# Patient Record
Sex: MALE | Race: WHITE | Employment: FULL TIME | URBAN - METROPOLITAN AREA
[De-identification: names, ages, dates, MRNs, and addresses within clinical notes are randomized per-mention and may not be internally consistent; named-entity substitution may affect disease eponyms.]

---

## 2017-02-07 RX ORDER — NAPROXEN 500 MG/1
TABLET ORAL
Qty: 180 TABLET | Refills: 0 | Status: SHIPPED | OUTPATIENT
Start: 2017-02-07 | End: 2017-05-06 | Stop reason: SDUPTHER

## 2017-05-08 RX ORDER — NAPROXEN 500 MG/1
TABLET ORAL
Qty: 180 TABLET | Refills: 1 | Status: SHIPPED | OUTPATIENT
Start: 2017-05-08 | End: 2017-11-06 | Stop reason: SDUPTHER

## 2017-06-06 ENCOUNTER — TELEPHONE (OUTPATIENT)
Dept: FAMILY MEDICINE CLINIC | Age: 57
End: 2017-06-06

## 2017-06-06 DIAGNOSIS — E78.5 HYPERLIPIDEMIA, UNSPECIFIED HYPERLIPIDEMIA TYPE: Primary | ICD-10-CM

## 2017-06-06 DIAGNOSIS — I10 ESSENTIAL HYPERTENSION: ICD-10-CM

## 2017-06-06 DIAGNOSIS — E11.8 TYPE 2 DIABETES MELLITUS WITH COMPLICATION, WITHOUT LONG-TERM CURRENT USE OF INSULIN (HCC): ICD-10-CM

## 2017-06-06 DIAGNOSIS — Z12.5 PROSTATE CANCER SCREENING: ICD-10-CM

## 2017-06-07 DIAGNOSIS — Z12.5 PROSTATE CANCER SCREENING: ICD-10-CM

## 2017-06-07 DIAGNOSIS — E78.5 HYPERLIPIDEMIA, UNSPECIFIED HYPERLIPIDEMIA TYPE: ICD-10-CM

## 2017-06-07 DIAGNOSIS — I10 ESSENTIAL HYPERTENSION: ICD-10-CM

## 2017-06-07 DIAGNOSIS — E11.8 TYPE 2 DIABETES MELLITUS WITH COMPLICATION, WITHOUT LONG-TERM CURRENT USE OF INSULIN (HCC): ICD-10-CM

## 2017-06-07 LAB
ALBUMIN SERPL-MCNC: 4.3 G/DL (ref 3.9–4.9)
ALP BLD-CCNC: 61 U/L (ref 35–104)
ALT SERPL-CCNC: 23 U/L (ref 0–41)
ANION GAP SERPL CALCULATED.3IONS-SCNC: 15 MEQ/L (ref 7–13)
AST SERPL-CCNC: 25 U/L (ref 0–40)
BASOPHILS ABSOLUTE: 0 K/UL (ref 0–0.2)
BASOPHILS RELATIVE PERCENT: 0.4 %
BILIRUB SERPL-MCNC: 0.4 MG/DL (ref 0–1.2)
BUN BLDV-MCNC: 20 MG/DL (ref 6–20)
CALCIUM SERPL-MCNC: 9.6 MG/DL (ref 8.6–10.2)
CHLORIDE BLD-SCNC: 104 MEQ/L (ref 98–107)
CHOLESTEROL, TOTAL: 191 MG/DL (ref 0–199)
CO2: 24 MEQ/L (ref 22–29)
CREAT SERPL-MCNC: 1.22 MG/DL (ref 0.7–1.2)
CREATININE URINE: 137.1 MG/DL
EOSINOPHILS ABSOLUTE: 0 K/UL (ref 0–0.7)
EOSINOPHILS RELATIVE PERCENT: 0.3 %
GFR AFRICAN AMERICAN: >60
GFR NON-AFRICAN AMERICAN: >60
GLOBULIN: 2.1 G/DL (ref 2.3–3.5)
GLUCOSE BLD-MCNC: 96 MG/DL (ref 74–109)
HBA1C MFR BLD: 8.1 % (ref 4.8–5.9)
HCT VFR BLD CALC: 43.1 % (ref 42–52)
HDLC SERPL-MCNC: 66 MG/DL (ref 40–59)
HEMOGLOBIN: 14.1 G/DL (ref 14–18)
LDL CHOLESTEROL CALCULATED: 113 MG/DL (ref 0–129)
LYMPHOCYTES ABSOLUTE: 1.7 K/UL (ref 1–4.8)
LYMPHOCYTES RELATIVE PERCENT: 22.9 %
MCH RBC QN AUTO: 27.9 PG (ref 27–31.3)
MCHC RBC AUTO-ENTMCNC: 32.6 % (ref 33–37)
MCV RBC AUTO: 85.4 FL (ref 80–100)
MICROALBUMIN UR-MCNC: 2.3 MG/DL
MICROALBUMIN/CREAT UR-RTO: 16.8 MG/G (ref 0–30)
MONOCYTES ABSOLUTE: 0.5 K/UL (ref 0.2–0.8)
MONOCYTES RELATIVE PERCENT: 6 %
NEUTROPHILS ABSOLUTE: 5.3 K/UL (ref 1.4–6.5)
NEUTROPHILS RELATIVE PERCENT: 70.4 %
PDW BLD-RTO: 14.4 % (ref 11.5–14.5)
PLATELET # BLD: 151 K/UL (ref 130–400)
POTASSIUM SERPL-SCNC: 5 MEQ/L (ref 3.5–5.1)
PROSTATE SPECIFIC ANTIGEN: 0.52 NG/ML (ref 0–3.89)
RBC # BLD: 5.05 M/UL (ref 4.7–6.1)
SODIUM BLD-SCNC: 143 MEQ/L (ref 132–144)
TOTAL PROTEIN: 6.4 G/DL (ref 6.4–8.1)
TRIGL SERPL-MCNC: 58 MG/DL (ref 0–200)
TSH SERPL DL<=0.05 MIU/L-ACNC: 2.48 UIU/ML (ref 0.27–4.2)
WBC # BLD: 7.6 K/UL (ref 4.8–10.8)

## 2017-06-15 ENCOUNTER — OFFICE VISIT (OUTPATIENT)
Dept: FAMILY MEDICINE CLINIC | Age: 57
End: 2017-06-15

## 2017-06-15 VITALS
WEIGHT: 194 LBS | TEMPERATURE: 98.4 F | DIASTOLIC BLOOD PRESSURE: 74 MMHG | HEART RATE: 72 BPM | BODY MASS INDEX: 28.73 KG/M2 | SYSTOLIC BLOOD PRESSURE: 106 MMHG | RESPIRATION RATE: 14 BRPM | HEIGHT: 69 IN

## 2017-06-15 DIAGNOSIS — E11.8 TYPE 2 DIABETES MELLITUS WITH COMPLICATION, WITHOUT LONG-TERM CURRENT USE OF INSULIN (HCC): Primary | ICD-10-CM

## 2017-06-15 DIAGNOSIS — M25.561 CHRONIC PAIN OF BOTH KNEES: ICD-10-CM

## 2017-06-15 DIAGNOSIS — E78.5 HYPERLIPIDEMIA, UNSPECIFIED HYPERLIPIDEMIA TYPE: ICD-10-CM

## 2017-06-15 DIAGNOSIS — K21.9 GASTROESOPHAGEAL REFLUX DISEASE, ESOPHAGITIS PRESENCE NOT SPECIFIED: ICD-10-CM

## 2017-06-15 DIAGNOSIS — I10 ESSENTIAL HYPERTENSION: ICD-10-CM

## 2017-06-15 DIAGNOSIS — G89.29 CHRONIC PAIN OF BOTH KNEES: ICD-10-CM

## 2017-06-15 DIAGNOSIS — M25.562 CHRONIC PAIN OF BOTH KNEES: ICD-10-CM

## 2017-06-15 DIAGNOSIS — Z12.11 SCREENING FOR COLON CANCER: ICD-10-CM

## 2017-06-15 PROCEDURE — G8427 DOCREV CUR MEDS BY ELIG CLIN: HCPCS | Performed by: FAMILY MEDICINE

## 2017-06-15 PROCEDURE — G8419 CALC BMI OUT NRM PARAM NOF/U: HCPCS | Performed by: FAMILY MEDICINE

## 2017-06-15 PROCEDURE — 99214 OFFICE O/P EST MOD 30 MIN: CPT | Performed by: FAMILY MEDICINE

## 2017-06-15 PROCEDURE — 3017F COLORECTAL CA SCREEN DOC REV: CPT | Performed by: FAMILY MEDICINE

## 2017-06-15 PROCEDURE — 1036F TOBACCO NON-USER: CPT | Performed by: FAMILY MEDICINE

## 2017-06-15 PROCEDURE — 3046F HEMOGLOBIN A1C LEVEL >9.0%: CPT | Performed by: FAMILY MEDICINE

## 2017-06-15 RX ORDER — ENALAPRIL MALEATE 10 MG/1
TABLET ORAL
Qty: 90 TABLET | Refills: 3 | Status: SHIPPED | OUTPATIENT
Start: 2017-06-15 | End: 2018-04-10 | Stop reason: SDUPTHER

## 2017-06-15 RX ORDER — ASPIRIN 81 MG/1
81 TABLET ORAL DAILY
Qty: 90 TABLET | Refills: 3 | Status: SHIPPED | OUTPATIENT
Start: 2017-06-15 | End: 2018-07-20 | Stop reason: SDUPTHER

## 2017-06-15 RX ORDER — FAMOTIDINE 20 MG/1
TABLET, FILM COATED ORAL
Qty: 180 TABLET | Refills: 3 | Status: SHIPPED | OUTPATIENT
Start: 2017-06-15 | End: 2018-04-10 | Stop reason: SDUPTHER

## 2017-06-15 RX ORDER — LOVASTATIN 20 MG/1
TABLET ORAL
Qty: 90 TABLET | Refills: 3 | Status: SHIPPED | OUTPATIENT
Start: 2017-06-15 | End: 2018-04-10 | Stop reason: SDUPTHER

## 2017-06-15 ASSESSMENT — PATIENT HEALTH QUESTIONNAIRE - PHQ9
SUM OF ALL RESPONSES TO PHQ9 QUESTIONS 1 & 2: 0
2. FEELING DOWN, DEPRESSED OR HOPELESS: 0
SUM OF ALL RESPONSES TO PHQ QUESTIONS 1-9: 0
1. LITTLE INTEREST OR PLEASURE IN DOING THINGS: 0

## 2017-06-15 ASSESSMENT — ENCOUNTER SYMPTOMS
EYES NEGATIVE: 1
ALLERGIC/IMMUNOLOGIC NEGATIVE: 1
RESPIRATORY NEGATIVE: 1
GASTROINTESTINAL NEGATIVE: 1

## 2017-11-06 RX ORDER — NAPROXEN 500 MG/1
TABLET ORAL
Qty: 180 TABLET | Refills: 1 | Status: SHIPPED | OUTPATIENT
Start: 2017-11-06 | End: 2018-05-05 | Stop reason: SDUPTHER

## 2018-04-06 DIAGNOSIS — K21.9 GASTROESOPHAGEAL REFLUX DISEASE, ESOPHAGITIS PRESENCE NOT SPECIFIED: ICD-10-CM

## 2018-04-06 DIAGNOSIS — E78.5 HYPERLIPIDEMIA, UNSPECIFIED HYPERLIPIDEMIA TYPE: ICD-10-CM

## 2018-04-06 DIAGNOSIS — I10 ESSENTIAL HYPERTENSION: ICD-10-CM

## 2018-04-06 DIAGNOSIS — E11.8 TYPE 2 DIABETES MELLITUS WITH COMPLICATION, WITHOUT LONG-TERM CURRENT USE OF INSULIN (HCC): ICD-10-CM

## 2018-04-06 LAB
ALBUMIN SERPL-MCNC: 4.6 G/DL (ref 3.9–4.9)
ALP BLD-CCNC: 66 U/L (ref 35–104)
ALT SERPL-CCNC: 22 U/L (ref 0–41)
ANION GAP SERPL CALCULATED.3IONS-SCNC: 17 MEQ/L (ref 7–13)
AST SERPL-CCNC: 18 U/L (ref 0–40)
BASOPHILS ABSOLUTE: 0 K/UL (ref 0–0.2)
BASOPHILS RELATIVE PERCENT: 0.4 %
BILIRUB SERPL-MCNC: 0.5 MG/DL (ref 0–1.2)
BUN BLDV-MCNC: 32 MG/DL (ref 6–20)
CALCIUM SERPL-MCNC: 9.8 MG/DL (ref 8.6–10.2)
CHLORIDE BLD-SCNC: 103 MEQ/L (ref 98–107)
CHOLESTEROL, TOTAL: 166 MG/DL (ref 0–199)
CO2: 22 MEQ/L (ref 22–29)
CREAT SERPL-MCNC: 1.14 MG/DL (ref 0.7–1.2)
CREATININE URINE: 223.3 MG/DL
EOSINOPHILS ABSOLUTE: 0 K/UL (ref 0–0.7)
EOSINOPHILS RELATIVE PERCENT: 0.4 %
GFR AFRICAN AMERICAN: >60
GFR NON-AFRICAN AMERICAN: >60
GLOBULIN: 2 G/DL (ref 2.3–3.5)
GLUCOSE BLD-MCNC: 186 MG/DL (ref 74–109)
HBA1C MFR BLD: 10 % (ref 4.8–5.9)
HCT VFR BLD CALC: 44.2 % (ref 42–52)
HDLC SERPL-MCNC: 59 MG/DL (ref 40–59)
HEMOGLOBIN: 14.5 G/DL (ref 14–18)
LDL CHOLESTEROL CALCULATED: 97 MG/DL (ref 0–129)
LYMPHOCYTES ABSOLUTE: 1.2 K/UL (ref 1–4.8)
LYMPHOCYTES RELATIVE PERCENT: 22 %
MCH RBC QN AUTO: 28.8 PG (ref 27–31.3)
MCHC RBC AUTO-ENTMCNC: 32.8 % (ref 33–37)
MCV RBC AUTO: 87.8 FL (ref 80–100)
MICROALBUMIN UR-MCNC: 4.7 MG/DL
MICROALBUMIN/CREAT UR-RTO: 21 MG/G (ref 0–30)
MONOCYTES ABSOLUTE: 0.3 K/UL (ref 0.2–0.8)
MONOCYTES RELATIVE PERCENT: 5.4 %
NEUTROPHILS ABSOLUTE: 3.8 K/UL (ref 1.4–6.5)
NEUTROPHILS RELATIVE PERCENT: 71.8 %
PDW BLD-RTO: 14.7 % (ref 11.5–14.5)
PLATELET # BLD: 149 K/UL (ref 130–400)
POTASSIUM SERPL-SCNC: 5.3 MEQ/L (ref 3.5–5.1)
RBC # BLD: 5.04 M/UL (ref 4.7–6.1)
SODIUM BLD-SCNC: 142 MEQ/L (ref 132–144)
TOTAL PROTEIN: 6.6 G/DL (ref 6.4–8.1)
TRIGL SERPL-MCNC: 51 MG/DL (ref 0–200)
TSH REFLEX: 2.08 UIU/ML (ref 0.27–4.2)
WBC # BLD: 5.2 K/UL (ref 4.8–10.8)

## 2018-04-10 ENCOUNTER — OFFICE VISIT (OUTPATIENT)
Dept: FAMILY MEDICINE CLINIC | Age: 58
End: 2018-04-10
Payer: COMMERCIAL

## 2018-04-10 VITALS
SYSTOLIC BLOOD PRESSURE: 110 MMHG | DIASTOLIC BLOOD PRESSURE: 80 MMHG | HEART RATE: 76 BPM | OXYGEN SATURATION: 99 % | RESPIRATION RATE: 16 BRPM | BODY MASS INDEX: 29.1 KG/M2 | TEMPERATURE: 97.5 F | HEIGHT: 68 IN | WEIGHT: 192 LBS

## 2018-04-10 DIAGNOSIS — K21.9 GASTROESOPHAGEAL REFLUX DISEASE, ESOPHAGITIS PRESENCE NOT SPECIFIED: ICD-10-CM

## 2018-04-10 DIAGNOSIS — I10 ESSENTIAL HYPERTENSION: ICD-10-CM

## 2018-04-10 DIAGNOSIS — E11.42 TYPE 2 DIABETES MELLITUS WITH DIABETIC POLYNEUROPATHY, WITHOUT LONG-TERM CURRENT USE OF INSULIN (HCC): Primary | ICD-10-CM

## 2018-04-10 DIAGNOSIS — E78.5 HYPERLIPIDEMIA, UNSPECIFIED HYPERLIPIDEMIA TYPE: ICD-10-CM

## 2018-04-10 DIAGNOSIS — Z12.11 SCREENING FOR COLON CANCER: ICD-10-CM

## 2018-04-10 PROCEDURE — 3017F COLORECTAL CA SCREEN DOC REV: CPT | Performed by: FAMILY MEDICINE

## 2018-04-10 PROCEDURE — 99214 OFFICE O/P EST MOD 30 MIN: CPT | Performed by: FAMILY MEDICINE

## 2018-04-10 PROCEDURE — 2022F DILAT RTA XM EVC RTNOPTHY: CPT | Performed by: FAMILY MEDICINE

## 2018-04-10 PROCEDURE — G8419 CALC BMI OUT NRM PARAM NOF/U: HCPCS | Performed by: FAMILY MEDICINE

## 2018-04-10 PROCEDURE — G8427 DOCREV CUR MEDS BY ELIG CLIN: HCPCS | Performed by: FAMILY MEDICINE

## 2018-04-10 PROCEDURE — 3046F HEMOGLOBIN A1C LEVEL >9.0%: CPT | Performed by: FAMILY MEDICINE

## 2018-04-10 PROCEDURE — 1036F TOBACCO NON-USER: CPT | Performed by: FAMILY MEDICINE

## 2018-04-10 RX ORDER — ENALAPRIL MALEATE 5 MG/1
TABLET ORAL
Qty: 90 TABLET | Refills: 3 | Status: SHIPPED | OUTPATIENT
Start: 2018-04-10 | End: 2019-03-25 | Stop reason: SDUPTHER

## 2018-04-10 RX ORDER — LOVASTATIN 20 MG/1
TABLET ORAL
Qty: 90 TABLET | Refills: 3 | Status: SHIPPED | OUTPATIENT
Start: 2018-04-10

## 2018-04-10 RX ORDER — FAMOTIDINE 20 MG/1
TABLET, FILM COATED ORAL
Qty: 180 TABLET | Refills: 3 | Status: SHIPPED | OUTPATIENT
Start: 2018-04-10

## 2018-04-10 ASSESSMENT — ENCOUNTER SYMPTOMS
EYES NEGATIVE: 1
GASTROINTESTINAL NEGATIVE: 1
RESPIRATORY NEGATIVE: 1
ALLERGIC/IMMUNOLOGIC NEGATIVE: 1

## 2018-04-20 ENCOUNTER — TELEPHONE (OUTPATIENT)
Dept: FAMILY MEDICINE CLINIC | Age: 58
End: 2018-04-20

## 2018-05-07 RX ORDER — NAPROXEN 500 MG/1
TABLET ORAL
Qty: 180 TABLET | Refills: 1 | Status: SHIPPED | OUTPATIENT
Start: 2018-05-07 | End: 2018-11-03 | Stop reason: SDUPTHER

## 2018-07-20 DIAGNOSIS — E11.8 TYPE 2 DIABETES MELLITUS WITH COMPLICATION, WITHOUT LONG-TERM CURRENT USE OF INSULIN (HCC): ICD-10-CM

## 2018-07-20 DIAGNOSIS — E78.5 HYPERLIPIDEMIA, UNSPECIFIED HYPERLIPIDEMIA TYPE: ICD-10-CM

## 2018-07-20 DIAGNOSIS — I10 ESSENTIAL HYPERTENSION: ICD-10-CM

## 2018-07-20 RX ORDER — ASPIRIN 81 MG/1
TABLET ORAL
Qty: 90 TABLET | Refills: 3 | Status: SHIPPED | OUTPATIENT
Start: 2018-07-20

## 2018-08-14 ENCOUNTER — INITIAL CONSULT (OUTPATIENT)
Dept: SURGERY | Age: 58
End: 2018-08-14
Payer: COMMERCIAL

## 2018-08-14 VITALS
SYSTOLIC BLOOD PRESSURE: 128 MMHG | DIASTOLIC BLOOD PRESSURE: 78 MMHG | HEART RATE: 81 BPM | BODY MASS INDEX: 27.44 KG/M2 | WEIGHT: 185.3 LBS | OXYGEN SATURATION: 95 % | HEIGHT: 69 IN

## 2018-08-14 DIAGNOSIS — Z12.11 SCREENING FOR COLON CANCER: Primary | ICD-10-CM

## 2018-08-14 DIAGNOSIS — Z86.010 HISTORY OF ADENOMATOUS POLYP OF COLON: Primary | ICD-10-CM

## 2018-08-14 PROCEDURE — 3017F COLORECTAL CA SCREEN DOC REV: CPT | Performed by: SURGERY

## 2018-08-14 PROCEDURE — G8427 DOCREV CUR MEDS BY ELIG CLIN: HCPCS | Performed by: SURGERY

## 2018-08-14 PROCEDURE — G8419 CALC BMI OUT NRM PARAM NOF/U: HCPCS | Performed by: SURGERY

## 2018-08-14 PROCEDURE — 99203 OFFICE O/P NEW LOW 30 MIN: CPT | Performed by: SURGERY

## 2018-08-14 PROCEDURE — 1036F TOBACCO NON-USER: CPT | Performed by: SURGERY

## 2018-08-14 RX ORDER — SODIUM, POTASSIUM,MAG SULFATES 17.5-3.13G
1 SOLUTION, RECONSTITUTED, ORAL ORAL ONCE
Qty: 1 BOTTLE | Refills: 0 | Status: SHIPPED | OUTPATIENT
Start: 2018-08-14 | End: 2018-08-14

## 2018-08-14 ASSESSMENT — ENCOUNTER SYMPTOMS
TROUBLE SWALLOWING: 0
CHOKING: 0
ANAL BLEEDING: 0
ABDOMINAL PAIN: 0
ABDOMINAL DISTENTION: 0
EYE PAIN: 0
EYE DISCHARGE: 0
COLOR CHANGE: 0
SHORTNESS OF BREATH: 0
WHEEZING: 0
BACK PAIN: 0
CHEST TIGHTNESS: 0
VOMITING: 0

## 2018-09-21 ENCOUNTER — HOSPITAL ENCOUNTER (OUTPATIENT)
Dept: PREADMISSION TESTING | Age: 58
Discharge: HOME | End: 2018-09-25
Payer: COMMERCIAL

## 2018-09-21 VITALS
BODY MASS INDEX: 27.68 KG/M2 | OXYGEN SATURATION: 98 % | DIASTOLIC BLOOD PRESSURE: 79 MMHG | RESPIRATION RATE: 18 BRPM | HEIGHT: 68 IN | WEIGHT: 182.6 LBS | HEART RATE: 67 BPM | TEMPERATURE: 97.7 F | SYSTOLIC BLOOD PRESSURE: 139 MMHG

## 2018-09-21 DIAGNOSIS — Z86.010 HISTORY OF COLON POLYPS: ICD-10-CM

## 2018-09-21 LAB
ANION GAP SERPL CALCULATED.3IONS-SCNC: 13 MEQ/L (ref 7–13)
BUN BLDV-MCNC: 33 MG/DL (ref 6–20)
CALCIUM SERPL-MCNC: 9.4 MG/DL (ref 8.6–10.2)
CHLORIDE BLD-SCNC: 103 MEQ/L (ref 98–107)
CO2: 22 MEQ/L (ref 22–29)
CREAT SERPL-MCNC: 1.32 MG/DL (ref 0.7–1.2)
EKG ATRIAL RATE: 63 BPM
EKG P AXIS: 50 DEGREES
EKG P-R INTERVAL: 138 MS
EKG Q-T INTERVAL: 414 MS
EKG QRS DURATION: 86 MS
EKG QTC CALCULATION (BAZETT): 423 MS
EKG R AXIS: 68 DEGREES
EKG T AXIS: 40 DEGREES
EKG VENTRICULAR RATE: 63 BPM
GFR AFRICAN AMERICAN: >60
GFR NON-AFRICAN AMERICAN: 55.8
GLUCOSE BLD-MCNC: 142 MG/DL (ref 74–109)
HCT VFR BLD CALC: 45.9 % (ref 42–52)
HEMOGLOBIN: 15.1 G/DL (ref 14–18)
MCH RBC QN AUTO: 28.5 PG (ref 27–31.3)
MCHC RBC AUTO-ENTMCNC: 32.9 % (ref 33–37)
MCV RBC AUTO: 86.8 FL (ref 80–100)
PDW BLD-RTO: 14.4 % (ref 11.5–14.5)
PLATELET # BLD: 167 K/UL (ref 130–400)
POTASSIUM SERPL-SCNC: 4.8 MEQ/L (ref 3.5–5.1)
RBC # BLD: 5.28 M/UL (ref 4.7–6.1)
SODIUM BLD-SCNC: 138 MEQ/L (ref 132–144)
WBC # BLD: 5.3 K/UL (ref 4.8–10.8)

## 2018-09-21 PROCEDURE — 80048 BASIC METABOLIC PNL TOTAL CA: CPT

## 2018-09-21 PROCEDURE — 93005 ELECTROCARDIOGRAM TRACING: CPT

## 2018-09-21 PROCEDURE — 85027 COMPLETE CBC AUTOMATED: CPT

## 2018-09-21 RX ORDER — LIDOCAINE HYDROCHLORIDE 10 MG/ML
1 INJECTION, SOLUTION EPIDURAL; INFILTRATION; INTRACAUDAL; PERINEURAL
Status: CANCELLED | OUTPATIENT
Start: 2018-09-21 | End: 2018-09-21

## 2018-09-21 RX ORDER — SODIUM CHLORIDE 0.9 % (FLUSH) 0.9 %
10 SYRINGE (ML) INJECTION PRN
Status: CANCELLED | OUTPATIENT
Start: 2018-09-21

## 2018-09-21 RX ORDER — SODIUM CHLORIDE 0.9 % (FLUSH) 0.9 %
10 SYRINGE (ML) INJECTION EVERY 12 HOURS SCHEDULED
Status: CANCELLED | OUTPATIENT
Start: 2018-09-21

## 2018-09-21 RX ORDER — SODIUM CHLORIDE, SODIUM LACTATE, POTASSIUM CHLORIDE, CALCIUM CHLORIDE 600; 310; 30; 20 MG/100ML; MG/100ML; MG/100ML; MG/100ML
INJECTION, SOLUTION INTRAVENOUS CONTINUOUS
Status: CANCELLED | OUTPATIENT
Start: 2018-09-21

## 2018-09-27 ENCOUNTER — ANESTHESIA EVENT (OUTPATIENT)
Dept: OPERATING ROOM | Age: 58
End: 2018-09-27
Payer: COMMERCIAL

## 2018-09-28 ENCOUNTER — ANESTHESIA (OUTPATIENT)
Dept: OPERATING ROOM | Age: 58
End: 2018-09-28
Payer: COMMERCIAL

## 2018-09-28 ENCOUNTER — HOSPITAL ENCOUNTER (OUTPATIENT)
Age: 58
Setting detail: OUTPATIENT SURGERY
Discharge: HOME | End: 2018-09-28
Attending: SURGERY | Admitting: SURGERY
Payer: COMMERCIAL

## 2018-09-28 VITALS
SYSTOLIC BLOOD PRESSURE: 83 MMHG | RESPIRATION RATE: 18 BRPM | OXYGEN SATURATION: 100 % | DIASTOLIC BLOOD PRESSURE: 53 MMHG

## 2018-09-28 VITALS
SYSTOLIC BLOOD PRESSURE: 129 MMHG | TEMPERATURE: 98.1 F | OXYGEN SATURATION: 99 % | HEART RATE: 86 BPM | DIASTOLIC BLOOD PRESSURE: 68 MMHG | RESPIRATION RATE: 16 BRPM

## 2018-09-28 LAB
ANION GAP SERPL CALCULATED.3IONS-SCNC: 18 MEQ/L (ref 7–13)
BUN BLDV-MCNC: 28 MG/DL (ref 6–20)
CALCIUM SERPL-MCNC: 9.2 MG/DL (ref 8.6–10.2)
CHLORIDE BLD-SCNC: 102 MEQ/L (ref 98–107)
CO2: 20 MEQ/L (ref 22–29)
CREAT SERPL-MCNC: 1.72 MG/DL (ref 0.7–1.2)
EKG ATRIAL RATE: 73 BPM
EKG Q-T INTERVAL: 386 MS
EKG QRS DURATION: 84 MS
EKG QTC CALCULATION (BAZETT): 425 MS
EKG R AXIS: 45 DEGREES
EKG T AXIS: 2 DEGREES
EKG VENTRICULAR RATE: 73 BPM
GFR AFRICAN AMERICAN: 49.7
GFR NON-AFRICAN AMERICAN: 41.1
GLUCOSE BLD-MCNC: 128 MG/DL (ref 74–109)
GLUCOSE BLD-MCNC: 132 MG/DL (ref 60–115)
GLUCOSE BLD-MCNC: 177 MG/DL (ref 60–115)
MAGNESIUM: 2.1 MG/DL (ref 1.7–2.3)
PERFORMED ON: ABNORMAL
PERFORMED ON: ABNORMAL
POTASSIUM SERPL-SCNC: 4.6 MEQ/L (ref 3.5–5.1)
SODIUM BLD-SCNC: 140 MEQ/L (ref 132–144)

## 2018-09-28 PROCEDURE — 7100000001 HC PACU RECOVERY - ADDTL 15 MIN: Performed by: SURGERY

## 2018-09-28 PROCEDURE — 7100000000 HC PACU RECOVERY - FIRST 15 MIN: Performed by: SURGERY

## 2018-09-28 PROCEDURE — 7100000010 HC PHASE II RECOVERY - FIRST 15 MIN: Performed by: SURGERY

## 2018-09-28 PROCEDURE — 2580000003 HC RX 258: Performed by: NURSE PRACTITIONER

## 2018-09-28 PROCEDURE — 45338 SIGMOIDOSCOPY W/TUMR REMOVE: CPT | Performed by: SURGERY

## 2018-09-28 PROCEDURE — 3700000001 HC ADD 15 MINUTES (ANESTHESIA): Performed by: SURGERY

## 2018-09-28 PROCEDURE — 83735 ASSAY OF MAGNESIUM: CPT

## 2018-09-28 PROCEDURE — 80048 BASIC METABOLIC PNL TOTAL CA: CPT

## 2018-09-28 PROCEDURE — 2709999900 HC NON-CHARGEABLE SUPPLY: Performed by: SURGERY

## 2018-09-28 PROCEDURE — 93010 ELECTROCARDIOGRAM REPORT: CPT | Performed by: INTERNAL MEDICINE

## 2018-09-28 PROCEDURE — 2500000003 HC RX 250 WO HCPCS: Performed by: NURSE ANESTHETIST, CERTIFIED REGISTERED

## 2018-09-28 PROCEDURE — 7100000011 HC PHASE II RECOVERY - ADDTL 15 MIN: Performed by: SURGERY

## 2018-09-28 PROCEDURE — 93005 ELECTROCARDIOGRAM TRACING: CPT

## 2018-09-28 PROCEDURE — 2580000003 HC RX 258: Performed by: SURGERY

## 2018-09-28 PROCEDURE — 3700000000 HC ANESTHESIA ATTENDED CARE: Performed by: SURGERY

## 2018-09-28 PROCEDURE — 3609027000 HC COLONOSCOPY: Performed by: SURGERY

## 2018-09-28 PROCEDURE — 88305 TISSUE EXAM BY PATHOLOGIST: CPT

## 2018-09-28 PROCEDURE — 6360000002 HC RX W HCPCS: Performed by: NURSE ANESTHETIST, CERTIFIED REGISTERED

## 2018-09-28 RX ORDER — MEPERIDINE HYDROCHLORIDE 25 MG/ML
12.5 INJECTION INTRAMUSCULAR; INTRAVENOUS; SUBCUTANEOUS EVERY 5 MIN PRN
Status: DISCONTINUED | OUTPATIENT
Start: 2018-09-28 | End: 2018-09-28 | Stop reason: HOSPADM

## 2018-09-28 RX ORDER — METOPROLOL SUCCINATE 25 MG/1
25 TABLET, EXTENDED RELEASE ORAL DAILY
Qty: 30 TABLET | Refills: 5 | Status: SHIPPED | OUTPATIENT
Start: 2018-09-28

## 2018-09-28 RX ORDER — METOPROLOL SUCCINATE 25 MG/1
25 TABLET, EXTENDED RELEASE ORAL DAILY
Status: DISCONTINUED | OUTPATIENT
Start: 2018-09-28 | End: 2018-09-28 | Stop reason: HOSPADM

## 2018-09-28 RX ORDER — SODIUM CHLORIDE, SODIUM LACTATE, POTASSIUM CHLORIDE, CALCIUM CHLORIDE 600; 310; 30; 20 MG/100ML; MG/100ML; MG/100ML; MG/100ML
INJECTION, SOLUTION INTRAVENOUS CONTINUOUS
Status: DISCONTINUED | OUTPATIENT
Start: 2018-09-28 | End: 2018-09-28 | Stop reason: HOSPADM

## 2018-09-28 RX ORDER — FENTANYL CITRATE 50 UG/ML
50 INJECTION, SOLUTION INTRAMUSCULAR; INTRAVENOUS EVERY 10 MIN PRN
Status: DISCONTINUED | OUTPATIENT
Start: 2018-09-28 | End: 2018-09-28 | Stop reason: HOSPADM

## 2018-09-28 RX ORDER — MAGNESIUM HYDROXIDE 1200 MG/15ML
LIQUID ORAL PRN
Status: DISCONTINUED | OUTPATIENT
Start: 2018-09-28 | End: 2018-09-28 | Stop reason: HOSPADM

## 2018-09-28 RX ORDER — DIPHENHYDRAMINE HYDROCHLORIDE 50 MG/ML
12.5 INJECTION INTRAMUSCULAR; INTRAVENOUS
Status: DISCONTINUED | OUTPATIENT
Start: 2018-09-28 | End: 2018-09-28 | Stop reason: HOSPADM

## 2018-09-28 RX ORDER — HYDROCODONE BITARTRATE AND ACETAMINOPHEN 5; 325 MG/1; MG/1
1 TABLET ORAL PRN
Status: DISCONTINUED | OUTPATIENT
Start: 2018-09-28 | End: 2018-09-28 | Stop reason: HOSPADM

## 2018-09-28 RX ORDER — LIDOCAINE HYDROCHLORIDE 20 MG/ML
INJECTION, SOLUTION INFILTRATION; PERINEURAL PRN
Status: DISCONTINUED | OUTPATIENT
Start: 2018-09-28 | End: 2018-09-28 | Stop reason: SDUPTHER

## 2018-09-28 RX ORDER — LIDOCAINE HYDROCHLORIDE 10 MG/ML
1 INJECTION, SOLUTION EPIDURAL; INFILTRATION; INTRACAUDAL; PERINEURAL
Status: DISCONTINUED | OUTPATIENT
Start: 2018-09-28 | End: 2018-09-28 | Stop reason: HOSPADM

## 2018-09-28 RX ORDER — ONDANSETRON 2 MG/ML
4 INJECTION INTRAMUSCULAR; INTRAVENOUS
Status: DISCONTINUED | OUTPATIENT
Start: 2018-09-28 | End: 2018-09-28 | Stop reason: HOSPADM

## 2018-09-28 RX ORDER — METOCLOPRAMIDE HYDROCHLORIDE 5 MG/ML
10 INJECTION INTRAMUSCULAR; INTRAVENOUS
Status: DISCONTINUED | OUTPATIENT
Start: 2018-09-28 | End: 2018-09-28 | Stop reason: HOSPADM

## 2018-09-28 RX ORDER — SODIUM CHLORIDE 0.9 % (FLUSH) 0.9 %
10 SYRINGE (ML) INJECTION PRN
Status: DISCONTINUED | OUTPATIENT
Start: 2018-09-28 | End: 2018-09-28 | Stop reason: HOSPADM

## 2018-09-28 RX ORDER — HYDROCODONE BITARTRATE AND ACETAMINOPHEN 5; 325 MG/1; MG/1
2 TABLET ORAL PRN
Status: DISCONTINUED | OUTPATIENT
Start: 2018-09-28 | End: 2018-09-28 | Stop reason: HOSPADM

## 2018-09-28 RX ORDER — SODIUM CHLORIDE 0.9 % (FLUSH) 0.9 %
10 SYRINGE (ML) INJECTION EVERY 12 HOURS SCHEDULED
Status: DISCONTINUED | OUTPATIENT
Start: 2018-09-28 | End: 2018-09-28 | Stop reason: HOSPADM

## 2018-09-28 RX ORDER — PROPOFOL 10 MG/ML
INJECTION, EMULSION INTRAVENOUS PRN
Status: DISCONTINUED | OUTPATIENT
Start: 2018-09-28 | End: 2018-09-28 | Stop reason: SDUPTHER

## 2018-09-28 RX ADMIN — PROPOFOL 20 MG: 10 INJECTION, EMULSION INTRAVENOUS at 08:00

## 2018-09-28 RX ADMIN — PHENYLEPHRINE HYDROCHLORIDE 50 MCG: 10 INJECTION INTRAVENOUS at 08:02

## 2018-09-28 RX ADMIN — PROPOFOL 50 MG: 10 INJECTION, EMULSION INTRAVENOUS at 07:57

## 2018-09-28 RX ADMIN — PHENYLEPHRINE HYDROCHLORIDE 100 MCG: 10 INJECTION INTRAVENOUS at 07:34

## 2018-09-28 RX ADMIN — PROPOFOL 50 MG: 10 INJECTION, EMULSION INTRAVENOUS at 07:32

## 2018-09-28 RX ADMIN — LIDOCAINE HYDROCHLORIDE 40 MG: 20 INJECTION, SOLUTION INFILTRATION; PERINEURAL at 07:29

## 2018-09-28 RX ADMIN — PROPOFOL 30 MG: 10 INJECTION, EMULSION INTRAVENOUS at 07:52

## 2018-09-28 RX ADMIN — PROPOFOL 50 MG: 10 INJECTION, EMULSION INTRAVENOUS at 07:29

## 2018-09-28 RX ADMIN — PROPOFOL 20 MG: 10 INJECTION, EMULSION INTRAVENOUS at 07:48

## 2018-09-28 RX ADMIN — PROPOFOL 50 MG: 10 INJECTION, EMULSION INTRAVENOUS at 07:30

## 2018-09-28 RX ADMIN — PROPOFOL 50 MG: 10 INJECTION, EMULSION INTRAVENOUS at 07:41

## 2018-09-28 RX ADMIN — SODIUM CHLORIDE, POTASSIUM CHLORIDE, SODIUM LACTATE AND CALCIUM CHLORIDE 125 ML/HR: 600; 310; 30; 20 INJECTION, SOLUTION INTRAVENOUS at 06:35

## 2018-09-28 RX ADMIN — PHENYLEPHRINE HYDROCHLORIDE 50 MCG: 10 INJECTION INTRAVENOUS at 07:41

## 2018-09-28 ASSESSMENT — PULMONARY FUNCTION TESTS
PIF_VALUE: 1

## 2018-09-28 ASSESSMENT — PAIN - FUNCTIONAL ASSESSMENT: PAIN_FUNCTIONAL_ASSESSMENT: 0-10

## 2018-09-28 ASSESSMENT — PAIN SCALES - GENERAL: PAINLEVEL_OUTOF10: 0

## 2018-09-28 NOTE — ANESTHESIA PRE PROCEDURE
List:    Patient Active Problem List   Diagnosis Code    Hyperlipidemia E78.5    Back pain M54.9    Type 2 diabetes mellitus with diabetic polyneuropathy, without long-term current use of insulin ([facility] E11.42    Osteoarthritis M19.90    Obesity E66.9    Depression F32.9    HTN (hypertension) I10    Gastroesophageal reflux disease K21.9    History of colon polyps Z86.010       Past Medical History:        Diagnosis Date    Back pain     Hyperlipidemia     meds > 5 yrs    Hypertension     on HTN meds but states not aware of dx of HTN / hx of diabetes / kidney disease    Obesity     Osteoarthritis     Type II or unspecified type diabetes mellitus without mention of complication, not stated as uncontrolled     dx > 10 yrs       Past Surgical History:        Procedure Laterality Date    EYE SURGERY      cataract OD?  TONSILLECTOMY AND ADENOIDECTOMY         Social History:    Social History   Substance Use Topics    Smoking status: Never Smoker    Smokeless tobacco: Never Used    Alcohol use Yes      Comment: occ.                                 Counseling given: Not Answered      Vital Signs (Current):   Vitals:    09/28/18 0614 09/28/18 0615 09/28/18 0645   BP: (!) 82/57 88/63 (!) 92/53   Pulse: 90 105 78   Resp: 12     Temp: 97.1 °F (36.2 °C)     TempSrc: Temporal     SpO2: 97%  97%                                              BP Readings from Last 3 Encounters:   09/28/18 (!) 92/53   09/21/18 139/79   08/14/18 128/78       NPO Status: Time of last liquid consumption: 2000                        Time of last solid consumption: 1800                        Date of last liquid consumption: 09/27/18                        Date of last solid food consumption: 09/26/18    BMI:   Wt Readings from Last 3 Encounters:   09/21/18 182 lb 9.6 oz (82.8 kg)   08/14/18 185 lb 4.8 oz (84.1 kg)   04/10/18 192 lb (87.1 kg)     There is no height or weight on file to calculate BMI.    CBC:   Lab Results   Component

## 2018-09-28 NOTE — H&P
[address]                       [address], [address] [facility]                               HISTORY AND PHYSICAL    PATIENT NAME: Laurie Morgan                      :        1960  MED REC NO:   [id number]                            ROOM:  ACCOUNT NO:   [de-identified]                           ADMIT DATE: 2018  PROVIDER:     Jeralyn Crigler, MD    DATE OF SURGERY:  2018. FAMILY PHYSICIAN:  Oswaldo Katz DO.    CHIEF COMPLAINT:  Personal history of adenomatous colorectal polyps. HISTORY OF PRESENT ILLNESS:  The patient is a 60-year-old male who presents  for colonoscopy. The patient's last scope was in . He had adenomatous polyps. Recently, he denies any change in bowel habits or rectal bleeding. There  is no family history of colon cancer. The patient presents now for  followup study. PAST MEDICAL HISTORY:  Includes elevated lipids, back pain, type 2  diabetes, obesity, depression, hypertension, GERD. MEDICATIONS:  Aspirin, naproxen, Januvia, Mevacor, Effexor, Vasotec,  Glucophage, Invokana. ALLERGIES:  SULFA. PHYSICAL EXAMINATION:  GENERAL:  Male, NAD. HEENT:  Sclerae clear. NECK:  No cervical adenopathy. No neck mass. CHEST:  Clear. CARDIAC:  No S3 or murmur. ABDOMEN:  Soft and nontender. No guarding, no rebound, no masses. No  hepatosplenomegaly. ASSESSMENT:  1. Personal history of adenomatous colorectal polyps. 2.  Baseline health issues to include non-insulin dependent diabetes,  hypertension, elevated lipids, etc.    PLAN:  The patient will undergo colonoscopy at his convenience. Risks,  benefits, and indications for this were reviewed. Potential bleeding,  etc., were reviewed. Potential need for biopsy or polypectomy discussed. Questions were answered and consent was obtained.       Haider Barrett MD    D: 2018 15:46:37       T: 2018 19:41:12     [email or web address]  Job#: [id number]

## 2018-09-28 NOTE — ANESTHESIA POSTPROCEDURE EVALUATION
Department of Anesthesiology  Postprocedure Note    Patient: Kwesi Ortega  MRN: [id number]  YOB: 1960  Date of evaluation: 9/28/2018  Time:  8:10 AM     Procedure Summary     Date:  09/28/18 Room / Location:  [facility] OR ENDO / [facility] OR    Anesthesia Start:  0725 Anesthesia Stop:      Procedure:  COLONOSCOPY (N/A ) Diagnosis:  (HX OF ADENOMATOUS POLYPS)    Surgeon:  Severiano Brannon MD Responsible Provider:  Audra Lundberg MD    Anesthesia Type:  general ASA Status:  2          Anesthesia Type: general    Nneka Phase I: Nneka Score: 10    Nneka Phase II:      Last vitals: Reviewed and per EMR flowsheets.        Anesthesia Post Evaluation    Patient location during evaluation: PACU  Patient participation: complete - patient participated  Level of consciousness: awake  Pain score: 0  Airway patency: patent  Nausea & Vomiting: no nausea  Complications: no  Cardiovascular status: hemodynamically stable  Respiratory status: acceptable  Hydration status: euvolemic

## 2018-09-28 NOTE — OP NOTE
[address]                       [address], [address] [facility]                                 OPERATIVE REPORT    PATIENT NAME: Kevin Aguiar                      :        1960  MED REC NO:   [id number]                            ROOM:  ACCOUNT NO:   [de-identified]                           ADMIT DATE: 2018  PROVIDER:     Tariq Del Rio MD    DATE OF PROCEDURE:  2018    PREOPERATIVE DIAGNOSIS:  Personal history of colorectal polyps. POSTOPERATIVE DIAGNOSES:  A 9- to 10-mm polyp of the splenic flexure,  diverticulosis. OPERATION PERFORMED:  Colonoscopy with ablation. SURGEON:  EDWAR Carranza:  Nurse. ANESTHESIA:  MAC.    INDICATIONS:  The patient is a 73-year-old male with a personal history of  polyps. He presents now for followup study. Risks, benefits, and  indications for this were reviewed with him. Potential need for biopsy or  polypectomy was discussed. Questions were answered and consent was  obtained. OPERATIVE PROCEDURE:  The patient was taken to the operating room and  placed on the table in left lateral decubitus position. IV sedation was  administered. Time-out procedures were followed. Digital rectal exam was performed. There was no mass or gross blood. Scope was inserted and the cecum accessed. Appendiceal orifice and cecal  markings were all identified. Cecum, right colon, and transverse colon  were unremarkable. At the level of the splenic flexure, there was a 9- to 10-mm polyp. Several cold forceps biopsies of this were taken. Lesion was now ablated  with hot snare. Again, this was viewed and noted to be hemostatic. Scope was further withdrawn. I saw no polyps in the sigmoid. Rectum was  unremarkable with antegrade and retrograde views. Sigmoid had moderate diverticulosis. Given the presence of the polyp, he needs a followup study in 3 years.     Diverticulosis would benefit from increased fiber and water intake. Withdrawal time was 15 minutes. The patient tolerated the procedure well.         Dragan Leonard MD    D: 09/28/2018 8:20:04       T: 09/28/2018 10:01:21     [email or web address]  Job#: [id number]     Doc#: [id number]    CC:  Jose G Abraham DO

## 2018-09-28 NOTE — PROGRESS NOTES
Dr Angelica Pulliam at bedside to speak with pt.  States to call Dr Roberts regarding follow up for Afib

## 2018-09-28 NOTE — CONSULTS
and equally palpable; 2+ throughout. No femoral bruits. Diagnostics:    EKG:  Atrial fibrillation with rate of 73 bpm  Nonspecific ST abnormality  Abnormal ECG  When compared with ECG of 21-SEP-2018 09:08,  Atrial fibrillation has replaced Sinus rhythm    Telemetry: atrial fibrillation - controlled. Lab Data:  BMP:  Recent Labs      09/28/18   1016   NA  140   K  4.6   CL  102   CO2  20*   BUN  28*   CREATININE  1.72*   LABGLOM  41.1*       Magnesium:  Recent Labs      09/28/18   1016   MG  2.1       TSH:  Lab Results   Component Value Date    TSH 2.480 06/07/2017       Lipid Profile:  Lab Results   Component Value Date    TRIG 51 04/06/2018    HDL 59 04/06/2018    LDLCALC 97 04/06/2018       HgbA1C:  Lab Results   Component Value Date    LABA1C 10.0 04/06/2018       CMP:  Recent Labs      09/28/18   1016   NA  140   K  4.6   CL  102   CO2  20*   BUN  28*   CREATININE  1.72*   GLUCOSE  128*   CALCIUM  9.2             Impression:   Active Problems:    Special screening for malignant neoplasms, colon  Resolved Problems:    * No resolved hospital problems. *    1. New onset atrial fibrillation with reasonably well controlled ventricular response. Likely related to prep for colonoscopy and dehydration. 2.  Type II DM    3. Hyperlipdemia    4. Prerenal azotemia    5. DJD    6. CHADS2-Vasc2 score of 1        Recommendations:    Start Toprol XL 25 mg po daily    Resume ASA 81 mg po daily    Will start on Eliquis if atrial fibrillation persists and there is need for eventual cardioversion    He will be scheduled for an outpatient echo and 24 hour holter monitor    Follow-up office visit 1-2 weeks      Thank you for the opportunity to participate in the care of your patient. Do not hesitate to call if you have any questions.     Electronically signed by Mali Barbosa MD, [facility] on 9/28/2018 at 11:35 AM

## 2018-11-05 RX ORDER — NAPROXEN 500 MG/1
TABLET ORAL
Qty: 60 TABLET | Refills: 0 | Status: SHIPPED | OUTPATIENT
Start: 2018-11-05

## 2018-12-18 ENCOUNTER — OFFICE VISIT (OUTPATIENT)
Dept: FAMILY MEDICINE CLINIC | Age: 58
End: 2018-12-18
Payer: COMMERCIAL

## 2018-12-18 VITALS
OXYGEN SATURATION: 99 % | HEART RATE: 83 BPM | DIASTOLIC BLOOD PRESSURE: 88 MMHG | SYSTOLIC BLOOD PRESSURE: 138 MMHG | HEIGHT: 68 IN | BODY MASS INDEX: 28.67 KG/M2 | RESPIRATION RATE: 12 BRPM | WEIGHT: 189.2 LBS | TEMPERATURE: 97.8 F

## 2018-12-18 DIAGNOSIS — M11.262 PSEUDOGOUT OF KNEE, LEFT: ICD-10-CM

## 2018-12-18 DIAGNOSIS — M25.562 ACUTE PAIN OF LEFT KNEE: ICD-10-CM

## 2018-12-18 DIAGNOSIS — M17.12 PRIMARY OSTEOARTHRITIS OF LEFT KNEE: Primary | ICD-10-CM

## 2018-12-18 DIAGNOSIS — M25.462 EFFUSION OF LEFT KNEE: ICD-10-CM

## 2018-12-18 LAB
SEDIMENTATION RATE, ERYTHROCYTE: 2 MM (ref 0–20)
URIC ACID, SERUM: 6 MG/DL (ref 3.4–7)

## 2018-12-18 PROCEDURE — 99213 OFFICE O/P EST LOW 20 MIN: CPT | Performed by: FAMILY MEDICINE

## 2018-12-18 PROCEDURE — G8427 DOCREV CUR MEDS BY ELIG CLIN: HCPCS | Performed by: FAMILY MEDICINE

## 2018-12-18 PROCEDURE — 3017F COLORECTAL CA SCREEN DOC REV: CPT | Performed by: FAMILY MEDICINE

## 2018-12-18 PROCEDURE — G8419 CALC BMI OUT NRM PARAM NOF/U: HCPCS | Performed by: FAMILY MEDICINE

## 2018-12-18 PROCEDURE — 1036F TOBACCO NON-USER: CPT | Performed by: FAMILY MEDICINE

## 2018-12-18 PROCEDURE — G8484 FLU IMMUNIZE NO ADMIN: HCPCS | Performed by: FAMILY MEDICINE

## 2018-12-18 RX ORDER — PREDNISONE 10 MG/1
TABLET ORAL
Qty: 30 TABLET | Refills: 0 | Status: SHIPPED | OUTPATIENT
Start: 2018-12-18 | End: 2018-12-27 | Stop reason: ALTCHOICE

## 2018-12-18 ASSESSMENT — PATIENT HEALTH QUESTIONNAIRE - PHQ9
2. FEELING DOWN, DEPRESSED OR HOPELESS: 0
SUM OF ALL RESPONSES TO PHQ QUESTIONS 1-9: 0
1. LITTLE INTEREST OR PLEASURE IN DOING THINGS: 0
SUM OF ALL RESPONSES TO PHQ QUESTIONS 1-9: 0
SUM OF ALL RESPONSES TO PHQ9 QUESTIONS 1 & 2: 0

## 2018-12-19 ENCOUNTER — TELEPHONE (OUTPATIENT)
Dept: FAMILY MEDICINE CLINIC | Age: 58
End: 2018-12-19

## 2018-12-20 ENCOUNTER — OFFICE VISIT (OUTPATIENT)
Dept: FAMILY MEDICINE CLINIC | Age: 58
End: 2018-12-20
Payer: COMMERCIAL

## 2018-12-20 VITALS
HEIGHT: 68 IN | RESPIRATION RATE: 15 BRPM | OXYGEN SATURATION: 98 % | WEIGHT: 190 LBS | BODY MASS INDEX: 28.79 KG/M2 | SYSTOLIC BLOOD PRESSURE: 138 MMHG | HEART RATE: 86 BPM | DIASTOLIC BLOOD PRESSURE: 82 MMHG | TEMPERATURE: 97.8 F

## 2018-12-20 DIAGNOSIS — M17.12 OSTEOARTHRITIS OF LEFT KNEE, UNSPECIFIED OSTEOARTHRITIS TYPE: Primary | ICD-10-CM

## 2018-12-20 PROCEDURE — G8427 DOCREV CUR MEDS BY ELIG CLIN: HCPCS | Performed by: INTERNAL MEDICINE

## 2018-12-20 PROCEDURE — G8484 FLU IMMUNIZE NO ADMIN: HCPCS | Performed by: INTERNAL MEDICINE

## 2018-12-20 PROCEDURE — 20610 DRAIN/INJ JOINT/BURSA W/O US: CPT | Performed by: INTERNAL MEDICINE

## 2018-12-20 PROCEDURE — 99213 OFFICE O/P EST LOW 20 MIN: CPT | Performed by: INTERNAL MEDICINE

## 2018-12-20 PROCEDURE — G8419 CALC BMI OUT NRM PARAM NOF/U: HCPCS | Performed by: INTERNAL MEDICINE

## 2018-12-20 PROCEDURE — 3017F COLORECTAL CA SCREEN DOC REV: CPT | Performed by: INTERNAL MEDICINE

## 2018-12-20 PROCEDURE — 1036F TOBACCO NON-USER: CPT | Performed by: INTERNAL MEDICINE

## 2018-12-20 RX ORDER — METHYLPREDNISOLONE ACETATE 40 MG/ML
40 INJECTION, SUSPENSION INTRA-ARTICULAR; INTRALESIONAL; INTRAMUSCULAR; SOFT TISSUE ONCE
Status: SHIPPED | OUTPATIENT
Start: 2018-12-20

## 2018-12-20 ASSESSMENT — ENCOUNTER SYMPTOMS
EYE PAIN: 0
BACK PAIN: 0
SHORTNESS OF BREATH: 0
ABDOMINAL PAIN: 0

## 2018-12-20 NOTE — PROGRESS NOTES
gallop and no friction rub. No murmur heard. Pulmonary/Chest: No respiratory distress. Abdominal: Soft. Bowel sounds are normal. He exhibits no distension. There is no rebound. Musculoskeletal: He exhibits no edema. Left knee without warmth, pain, erythema, or swelling. Neurological: He is oriented to person, place, and time. Skin: Skin is warm and dry. Assessment:       Diagnosis Orders   1. Osteoarthritis of left knee, unspecified osteoarthritis type  MO ARTHROCENTESIS ASPIR&/INJ MAJOR JT/BURSA W/O US    Ambulatory referral to Physical Therapy    Amb External Referral To Orthopedic Surgery    methylPREDNISolone acetate (DEPO-MEDROL) injection 40 mg         Plan:    r/b/a discussed  He doesn't want to wait for specialist to perform  Denies feeling ill  Wants to proceed  Other orders per below  Any s/s of infection go to ER  He understands and agrees to close communication  He endorsed feeling better after the injection. Risks and benefits of intraarticular knee injections were discussed. Patient stated understanding of risks and agreed to proceed. landmarks were identified. Left Knee was  prepped in typical sterile fashion 2cc lido/1cc depo medrol were injected utilizing anterior lateral approach. Patient tolerated procedure well. Aftercare instructions given.     Orders Placed This Encounter   Procedures    Ambulatory referral to Physical Therapy     Referral Priority:   Routine     Referral Type:   Eval and Treat     Referral Reason:   Specialty Services Required     Requested Specialty:   Physical Therapy     Number of Visits Requested:   1    Amb External Referral To Orthopedic Surgery     Referral Priority:   Routine     Referral Type:   Eval and Treat     Referral Reason:   Specialty Services Required     Referred to Provider:   Adela Toth MD     Requested Specialty:   Orthopedic Surgery     Number of Visits Requested:   1    MO ARTHROCENTESIS ASPIR&/INJ MAJOR JT/BURSA W/O US Orders Placed This Encounter   Medications    methylPREDNISolone acetate (DEPO-MEDROL) injection 40 mg       No Follow-up on file. Luana Duverney, MD    If anything should change or worsen call ASAP, don't wait for next scheduled appointment.

## 2018-12-27 ENCOUNTER — OFFICE VISIT (OUTPATIENT)
Dept: FAMILY MEDICINE CLINIC | Age: 58
End: 2018-12-27
Payer: COMMERCIAL

## 2018-12-27 VITALS
BODY MASS INDEX: 28.49 KG/M2 | RESPIRATION RATE: 12 BRPM | WEIGHT: 188 LBS | TEMPERATURE: 97.4 F | DIASTOLIC BLOOD PRESSURE: 68 MMHG | HEIGHT: 68 IN | HEART RATE: 104 BPM | OXYGEN SATURATION: 98 % | SYSTOLIC BLOOD PRESSURE: 104 MMHG

## 2018-12-27 DIAGNOSIS — M54.16 LEFT LUMBAR RADICULOPATHY: ICD-10-CM

## 2018-12-27 DIAGNOSIS — G89.29 CHRONIC BILATERAL LOW BACK PAIN WITH LEFT-SIDED SCIATICA: Primary | ICD-10-CM

## 2018-12-27 DIAGNOSIS — G89.29 CHRONIC PAIN OF LEFT KNEE: ICD-10-CM

## 2018-12-27 DIAGNOSIS — M25.562 CHRONIC PAIN OF LEFT KNEE: ICD-10-CM

## 2018-12-27 DIAGNOSIS — M54.42 CHRONIC BILATERAL LOW BACK PAIN WITH LEFT-SIDED SCIATICA: Primary | ICD-10-CM

## 2018-12-27 PROCEDURE — 1036F TOBACCO NON-USER: CPT | Performed by: FAMILY MEDICINE

## 2018-12-27 PROCEDURE — 3017F COLORECTAL CA SCREEN DOC REV: CPT | Performed by: FAMILY MEDICINE

## 2018-12-27 PROCEDURE — G8419 CALC BMI OUT NRM PARAM NOF/U: HCPCS | Performed by: FAMILY MEDICINE

## 2018-12-27 PROCEDURE — G8427 DOCREV CUR MEDS BY ELIG CLIN: HCPCS | Performed by: FAMILY MEDICINE

## 2018-12-27 PROCEDURE — 99213 OFFICE O/P EST LOW 20 MIN: CPT | Performed by: FAMILY MEDICINE

## 2018-12-27 PROCEDURE — G8484 FLU IMMUNIZE NO ADMIN: HCPCS | Performed by: FAMILY MEDICINE

## 2018-12-27 RX ORDER — TIZANIDINE 4 MG/1
4 TABLET ORAL 3 TIMES DAILY PRN
Qty: 30 TABLET | Refills: 3 | Status: SHIPPED | OUTPATIENT
Start: 2018-12-27

## 2018-12-27 ASSESSMENT — ENCOUNTER SYMPTOMS
RESPIRATORY NEGATIVE: 1
BACK PAIN: 1
GASTROINTESTINAL NEGATIVE: 1

## 2018-12-27 NOTE — PROGRESS NOTES
and regular rhythm. Pulmonary/Chest: Effort normal and breath sounds normal.   Abdominal: Soft. Bowel sounds are normal.   Neurological: He is alert and oriented to person, place, and time. Skin: Skin is warm and dry. He is not diaphoretic. Nursing note and vitals reviewed. Assessment & Plan    Diagnosis Orders   1. Chronic bilateral low back pain with left-sided sciatica  XR LUMBAR SPINE (MIN 4 VIEWS)    tiZANidine (ZANAFLEX) 4 MG tablet    Ambulatory referral to Physical Therapy   2. Chronic pain of left knee  tiZANidine (ZANAFLEX) 4 MG tablet   3. Left lumbar radiculopathy  XR LUMBAR SPINE (MIN 4 VIEWS)    tiZANidine (ZANAFLEX) 4 MG tablet    Ambulatory referral to Physical Therapy     Orders Placed This Encounter   Procedures    XR LUMBAR SPINE (MIN 4 VIEWS)     Standing Status:   Future     Number of Occurrences:   1     Standing Expiration Date:   12/27/2019     Order Specific Question:   Reason for exam:     Answer:   low back pain    Ambulatory referral to Physical Therapy     Referral Priority:   Routine     Referral Type:   Eval and Treat     Referral Reason:   Specialty Services Required     Requested Specialty:   Physical Therapy     Number of Visits Requested:   1     Orders Placed This Encounter   Medications    tiZANidine (ZANAFLEX) 4 MG tablet     Sig: Take 1 tablet by mouth 3 times daily as needed (pain)     Dispense:  30 tablet     Refill:  3     Medications Discontinued During This Encounter   Medication Reason    predniSONE (DELTASONE) 10 MG tablet Therapy completed   Will add back issues to physical therapy as these are more of a chronic issue for him. We will also add some Zanaflex for the back pain as well. Patient is to keep his previously scheduled appointment with orthopedics for further evaluation regarding knee pain    Counseling given: Yes      Return if symptoms worsen or fail to improve.     Stanislaw Bill, DO

## 2019-01-03 ENCOUNTER — HOSPITAL ENCOUNTER (OUTPATIENT)
Dept: PHYSICAL THERAPY | Age: 59
Setting detail: THERAPIES SERIES
Discharge: HOME | End: 2019-01-03
Payer: COMMERCIAL

## 2019-01-03 PROCEDURE — 97161 PT EVAL LOW COMPLEX 20 MIN: CPT

## 2019-01-03 PROCEDURE — 97110 THERAPEUTIC EXERCISES: CPT

## 2019-01-03 ASSESSMENT — PAIN DESCRIPTION - DESCRIPTORS: DESCRIPTORS: ACHING

## 2019-01-03 ASSESSMENT — PAIN SCALES - GENERAL: PAINLEVEL_OUTOF10: 2

## 2019-01-03 ASSESSMENT — PAIN DESCRIPTION - LOCATION: LOCATION: KNEE;BACK

## 2019-01-03 ASSESSMENT — PAIN DESCRIPTION - ORIENTATION: ORIENTATION: LEFT;LOWER

## 2019-01-07 ENCOUNTER — CLINICAL DOCUMENTATION (OUTPATIENT)
Dept: PHYSICAL THERAPY | Age: 59
End: 2019-01-07

## 2019-02-20 ENCOUNTER — CLINICAL DOCUMENTATION (OUTPATIENT)
Dept: PHYSICAL THERAPY | Age: 59
End: 2019-02-20

## 2019-03-25 DIAGNOSIS — I10 ESSENTIAL HYPERTENSION: ICD-10-CM

## 2019-03-25 DIAGNOSIS — E11.42 TYPE 2 DIABETES MELLITUS WITH DIABETIC POLYNEUROPATHY, WITHOUT LONG-TERM CURRENT USE OF INSULIN (HCC): ICD-10-CM

## 2019-03-28 RX ORDER — ENALAPRIL MALEATE 5 MG/1
TABLET ORAL
Qty: 90 TABLET | Refills: 3 | Status: SHIPPED | OUTPATIENT
Start: 2019-03-28

## 2019-04-10 ENCOUNTER — TELEPHONE (OUTPATIENT)
Dept: FAMILY MEDICINE CLINIC | Age: 59
End: 2019-04-10

## 2023-06-14 DIAGNOSIS — I10 PRIMARY HYPERTENSION: ICD-10-CM

## 2023-06-14 DIAGNOSIS — N18.31 TYPE 2 DIABETES MELLITUS WITH STAGE 3A CHRONIC KIDNEY DISEASE, WITHOUT LONG-TERM CURRENT USE OF INSULIN (MULTI): Primary | ICD-10-CM

## 2023-06-14 DIAGNOSIS — E11.22 TYPE 2 DIABETES MELLITUS WITH STAGE 3A CHRONIC KIDNEY DISEASE, WITHOUT LONG-TERM CURRENT USE OF INSULIN (MULTI): Primary | ICD-10-CM

## 2023-06-14 PROBLEM — E78.5 HYPERLIPIDEMIA: Status: ACTIVE | Noted: 2023-06-14

## 2023-06-14 PROBLEM — N18.30 TYPE 2 DIABETES MELLITUS WITH STAGE 3 CHRONIC KIDNEY DISEASE, WITHOUT LONG-TERM CURRENT USE OF INSULIN (MULTI): Status: ACTIVE | Noted: 2023-06-14

## 2023-06-14 RX ORDER — ENALAPRIL MALEATE 5 MG/1
5 TABLET ORAL DAILY
Qty: 30 TABLET | Refills: 0 | Status: SHIPPED | OUTPATIENT
Start: 2023-06-14 | End: 2023-12-19 | Stop reason: DRUGHIGH

## 2023-11-23 DIAGNOSIS — N18.30 TYPE 2 DIABETES MELLITUS WITH STAGE 3 CHRONIC KIDNEY DISEASE, WITHOUT LONG-TERM CURRENT USE OF INSULIN, UNSPECIFIED WHETHER STAGE 3A OR 3B CKD (MULTI): ICD-10-CM

## 2023-11-23 DIAGNOSIS — E11.22 TYPE 2 DIABETES MELLITUS WITH STAGE 3 CHRONIC KIDNEY DISEASE, WITHOUT LONG-TERM CURRENT USE OF INSULIN, UNSPECIFIED WHETHER STAGE 3A OR 3B CKD (MULTI): ICD-10-CM

## 2023-11-24 DIAGNOSIS — E11.22 TYPE 2 DIABETES MELLITUS WITH STAGE 3 CHRONIC KIDNEY DISEASE, WITHOUT LONG-TERM CURRENT USE OF INSULIN, UNSPECIFIED WHETHER STAGE 3A OR 3B CKD (MULTI): ICD-10-CM

## 2023-11-24 DIAGNOSIS — E78.5 HYPERLIPIDEMIA, UNSPECIFIED HYPERLIPIDEMIA TYPE: ICD-10-CM

## 2023-11-24 DIAGNOSIS — N18.30 TYPE 2 DIABETES MELLITUS WITH STAGE 3 CHRONIC KIDNEY DISEASE, WITHOUT LONG-TERM CURRENT USE OF INSULIN, UNSPECIFIED WHETHER STAGE 3A OR 3B CKD (MULTI): ICD-10-CM

## 2023-11-24 DIAGNOSIS — I10 HYPERTENSION, UNSPECIFIED TYPE: ICD-10-CM

## 2023-11-27 RX ORDER — GLIMEPIRIDE 4 MG/1
4 TABLET ORAL
Qty: 30 TABLET | Refills: 0 | Status: SHIPPED | OUTPATIENT
Start: 2023-11-27 | End: 2023-12-19 | Stop reason: SDUPTHER

## 2023-12-14 PROBLEM — M77.9 TENDINITIS: Status: RESOLVED | Noted: 2023-12-14 | Resolved: 2023-12-14

## 2023-12-14 PROBLEM — I48.0 PAROXYSMAL ATRIAL FIBRILLATION (MULTI): Status: ACTIVE | Noted: 2018-10-24

## 2023-12-14 PROBLEM — M17.12 PRIMARY OSTEOARTHRITIS OF LEFT KNEE: Status: ACTIVE | Noted: 2017-01-19

## 2023-12-14 PROBLEM — M79.605 PAIN IN POSTERIOR LEFT LOWER EXTREMITY: Status: ACTIVE | Noted: 2023-12-14

## 2023-12-14 PROBLEM — M25.562 CHRONIC PAIN OF LEFT KNEE: Status: ACTIVE | Noted: 2023-12-14

## 2023-12-14 PROBLEM — G89.29 CHRONIC PAIN OF LEFT KNEE: Status: ACTIVE | Noted: 2023-12-14

## 2023-12-14 RX ORDER — FAMOTIDINE 20 MG/1
20 TABLET, FILM COATED ORAL EVERY 12 HOURS
COMMUNITY
Start: 2018-04-10 | End: 2023-12-19 | Stop reason: SDUPTHER

## 2023-12-14 RX ORDER — SPIRONOLACTONE 25 MG
TABLET ORAL
COMMUNITY

## 2023-12-14 RX ORDER — ASPIRIN 81 MG/1
81 TABLET ORAL DAILY
COMMUNITY

## 2023-12-14 RX ORDER — ROSUVASTATIN CALCIUM 10 MG/1
10 TABLET, COATED ORAL DAILY
COMMUNITY
Start: 2020-12-10 | End: 2023-12-19 | Stop reason: SDUPTHER

## 2023-12-15 ENCOUNTER — LAB (OUTPATIENT)
Dept: LAB | Facility: LAB | Age: 63
End: 2023-12-15
Payer: COMMERCIAL

## 2023-12-15 DIAGNOSIS — I10 HYPERTENSION, UNSPECIFIED TYPE: ICD-10-CM

## 2023-12-15 DIAGNOSIS — E78.5 HYPERLIPIDEMIA, UNSPECIFIED HYPERLIPIDEMIA TYPE: ICD-10-CM

## 2023-12-15 DIAGNOSIS — E11.22 TYPE 2 DIABETES MELLITUS WITH STAGE 3 CHRONIC KIDNEY DISEASE, WITHOUT LONG-TERM CURRENT USE OF INSULIN, UNSPECIFIED WHETHER STAGE 3A OR 3B CKD (MULTI): ICD-10-CM

## 2023-12-15 DIAGNOSIS — N18.30 TYPE 2 DIABETES MELLITUS WITH STAGE 3 CHRONIC KIDNEY DISEASE, WITHOUT LONG-TERM CURRENT USE OF INSULIN, UNSPECIFIED WHETHER STAGE 3A OR 3B CKD (MULTI): ICD-10-CM

## 2023-12-15 LAB
ALBUMIN SERPL BCP-MCNC: 4.2 G/DL (ref 3.4–5)
ALP SERPL-CCNC: 63 U/L (ref 33–136)
ALT SERPL W P-5'-P-CCNC: 20 U/L (ref 10–52)
ANION GAP SERPL CALC-SCNC: 13 MMOL/L (ref 10–20)
AST SERPL W P-5'-P-CCNC: 19 U/L (ref 9–39)
BASOPHILS # BLD AUTO: 0.02 X10*3/UL (ref 0–0.1)
BASOPHILS NFR BLD AUTO: 0.4 %
BILIRUB SERPL-MCNC: 0.7 MG/DL (ref 0–1.2)
BUN SERPL-MCNC: 21 MG/DL (ref 6–23)
CALCIUM SERPL-MCNC: 9.4 MG/DL (ref 8.6–10.3)
CHLORIDE SERPL-SCNC: 104 MMOL/L (ref 98–107)
CHOLEST SERPL-MCNC: 147 MG/DL (ref 0–199)
CHOLESTEROL/HDL RATIO: 2.6
CO2 SERPL-SCNC: 26 MMOL/L (ref 21–32)
CREAT SERPL-MCNC: 1.28 MG/DL (ref 0.5–1.3)
CREAT UR-MCNC: 399.7 MG/DL (ref 20–370)
EOSINOPHIL # BLD AUTO: 0.06 X10*3/UL (ref 0–0.7)
EOSINOPHIL NFR BLD AUTO: 1.3 %
ERYTHROCYTE [DISTWIDTH] IN BLOOD BY AUTOMATED COUNT: 13.5 % (ref 11.5–14.5)
EST. AVERAGE GLUCOSE BLD GHB EST-MCNC: 384 MG/DL
GFR SERPL CREATININE-BSD FRML MDRD: 63 ML/MIN/1.73M*2
GLUCOSE SERPL-MCNC: 146 MG/DL (ref 74–99)
HBA1C MFR BLD: 15 %
HCT VFR BLD AUTO: 46.6 % (ref 41–52)
HDLC SERPL-MCNC: 57.1 MG/DL
HGB BLD-MCNC: 15.1 G/DL (ref 13.5–17.5)
IMM GRANULOCYTES # BLD AUTO: 0.01 X10*3/UL (ref 0–0.7)
IMM GRANULOCYTES NFR BLD AUTO: 0.2 % (ref 0–0.9)
LDLC SERPL CALC-MCNC: 76 MG/DL
LYMPHOCYTES # BLD AUTO: 1.25 X10*3/UL (ref 1.2–4.8)
LYMPHOCYTES NFR BLD AUTO: 26.7 %
MAGNESIUM SERPL-MCNC: 1.42 MG/DL (ref 1.6–2.4)
MCH RBC QN AUTO: 27.7 PG (ref 26–34)
MCHC RBC AUTO-ENTMCNC: 32.4 G/DL (ref 32–36)
MCV RBC AUTO: 85 FL (ref 80–100)
MICROALBUMIN UR-MCNC: 1266.4 MG/L
MICROALBUMIN/CREAT UR: 316.8 UG/MG CREAT
MONOCYTES # BLD AUTO: 0.34 X10*3/UL (ref 0.1–1)
MONOCYTES NFR BLD AUTO: 7.2 %
NEUTROPHILS # BLD AUTO: 3.01 X10*3/UL (ref 1.2–7.7)
NEUTROPHILS NFR BLD AUTO: 64.2 %
NON HDL CHOLESTEROL: 90 MG/DL (ref 0–149)
NRBC BLD-RTO: 0 /100 WBCS (ref 0–0)
PLATELET # BLD AUTO: 165 X10*3/UL (ref 150–450)
POTASSIUM SERPL-SCNC: 4.4 MMOL/L (ref 3.5–5.3)
PROT SERPL-MCNC: 6.6 G/DL (ref 6.4–8.2)
RBC # BLD AUTO: 5.46 X10*6/UL (ref 4.5–5.9)
SODIUM SERPL-SCNC: 139 MMOL/L (ref 136–145)
TRIGL SERPL-MCNC: 72 MG/DL (ref 0–149)
TSH SERPL-ACNC: 2.24 MIU/L (ref 0.44–3.98)
VLDL: 14 MG/DL (ref 0–40)
WBC # BLD AUTO: 4.7 X10*3/UL (ref 4.4–11.3)

## 2023-12-15 PROCEDURE — 80061 LIPID PANEL: CPT

## 2023-12-15 PROCEDURE — 36415 COLL VENOUS BLD VENIPUNCTURE: CPT

## 2023-12-15 PROCEDURE — 84443 ASSAY THYROID STIM HORMONE: CPT

## 2023-12-15 PROCEDURE — 82570 ASSAY OF URINE CREATININE: CPT

## 2023-12-15 PROCEDURE — 82043 UR ALBUMIN QUANTITATIVE: CPT

## 2023-12-15 PROCEDURE — 83036 HEMOGLOBIN GLYCOSYLATED A1C: CPT

## 2023-12-15 PROCEDURE — 85025 COMPLETE CBC W/AUTO DIFF WBC: CPT

## 2023-12-15 PROCEDURE — 80053 COMPREHEN METABOLIC PANEL: CPT

## 2023-12-15 PROCEDURE — 83735 ASSAY OF MAGNESIUM: CPT

## 2023-12-19 ENCOUNTER — OFFICE VISIT (OUTPATIENT)
Dept: PRIMARY CARE | Facility: CLINIC | Age: 63
End: 2023-12-19
Payer: COMMERCIAL

## 2023-12-19 VITALS
OXYGEN SATURATION: 98 % | HEIGHT: 69 IN | HEART RATE: 79 BPM | BODY MASS INDEX: 25.33 KG/M2 | TEMPERATURE: 97.7 F | RESPIRATION RATE: 18 BRPM | WEIGHT: 171 LBS | SYSTOLIC BLOOD PRESSURE: 140 MMHG | DIASTOLIC BLOOD PRESSURE: 80 MMHG

## 2023-12-19 DIAGNOSIS — Z00.00 ROUTINE GENERAL MEDICAL EXAMINATION AT A HEALTH CARE FACILITY: Primary | ICD-10-CM

## 2023-12-19 DIAGNOSIS — E78.2 MIXED HYPERLIPIDEMIA: ICD-10-CM

## 2023-12-19 DIAGNOSIS — K21.9 GASTROESOPHAGEAL REFLUX DISEASE, UNSPECIFIED WHETHER ESOPHAGITIS PRESENT: ICD-10-CM

## 2023-12-19 DIAGNOSIS — G89.29 CHRONIC PAIN OF LEFT KNEE: ICD-10-CM

## 2023-12-19 DIAGNOSIS — M25.562 CHRONIC PAIN OF LEFT KNEE: ICD-10-CM

## 2023-12-19 DIAGNOSIS — Z12.5 SPECIAL SCREENING EXAMINATION FOR NEOPLASM OF PROSTATE: ICD-10-CM

## 2023-12-19 DIAGNOSIS — Z23 ENCOUNTER FOR IMMUNIZATION: ICD-10-CM

## 2023-12-19 DIAGNOSIS — R80.9 TYPE 2 DIABETES MELLITUS WITH MICROALBUMINURIA, WITHOUT LONG-TERM CURRENT USE OF INSULIN (MULTI): ICD-10-CM

## 2023-12-19 DIAGNOSIS — E11.29 TYPE 2 DIABETES MELLITUS WITH MICROALBUMINURIA, WITHOUT LONG-TERM CURRENT USE OF INSULIN (MULTI): ICD-10-CM

## 2023-12-19 DIAGNOSIS — I10 PRIMARY HYPERTENSION: ICD-10-CM

## 2023-12-19 DIAGNOSIS — M17.12 PRIMARY OSTEOARTHRITIS OF LEFT KNEE: ICD-10-CM

## 2023-12-19 PROBLEM — N18.30 TYPE 2 DIABETES MELLITUS WITH STAGE 3 CHRONIC KIDNEY DISEASE, WITHOUT LONG-TERM CURRENT USE OF INSULIN (MULTI): Status: RESOLVED | Noted: 2023-06-14 | Resolved: 2023-12-19

## 2023-12-19 PROBLEM — I48.0 PAROXYSMAL ATRIAL FIBRILLATION (MULTI): Status: RESOLVED | Noted: 2018-10-24 | Resolved: 2023-12-19

## 2023-12-19 PROBLEM — E11.22 TYPE 2 DIABETES MELLITUS WITH STAGE 3 CHRONIC KIDNEY DISEASE, WITHOUT LONG-TERM CURRENT USE OF INSULIN (MULTI): Status: RESOLVED | Noted: 2023-06-14 | Resolved: 2023-12-19

## 2023-12-19 PROCEDURE — 4010F ACE/ARB THERAPY RXD/TAKEN: CPT | Performed by: FAMILY MEDICINE

## 2023-12-19 PROCEDURE — 3077F SYST BP >= 140 MM HG: CPT | Performed by: FAMILY MEDICINE

## 2023-12-19 PROCEDURE — 99396 PREV VISIT EST AGE 40-64: CPT | Performed by: FAMILY MEDICINE

## 2023-12-19 PROCEDURE — 3048F LDL-C <100 MG/DL: CPT | Performed by: FAMILY MEDICINE

## 2023-12-19 PROCEDURE — 3079F DIAST BP 80-89 MM HG: CPT | Performed by: FAMILY MEDICINE

## 2023-12-19 PROCEDURE — 1036F TOBACCO NON-USER: CPT | Performed by: FAMILY MEDICINE

## 2023-12-19 PROCEDURE — 3046F HEMOGLOBIN A1C LEVEL >9.0%: CPT | Performed by: FAMILY MEDICINE

## 2023-12-19 PROCEDURE — 3062F POS MACROALBUMINURIA REV: CPT | Performed by: FAMILY MEDICINE

## 2023-12-19 RX ORDER — FAMOTIDINE 20 MG/1
20 TABLET, FILM COATED ORAL EVERY 12 HOURS
Qty: 180 TABLET | Refills: 3 | Status: SHIPPED | OUTPATIENT
Start: 2023-12-19 | End: 2023-12-22 | Stop reason: SDUPTHER

## 2023-12-19 RX ORDER — ENALAPRIL MALEATE 5 MG/1
5 TABLET ORAL DAILY
Qty: 90 TABLET | Refills: 3 | Status: CANCELLED | OUTPATIENT
Start: 2023-12-19 | End: 2024-12-18

## 2023-12-19 RX ORDER — GLIMEPIRIDE 4 MG/1
4 TABLET ORAL
Qty: 90 TABLET | Refills: 3 | Status: CANCELLED | OUTPATIENT
Start: 2023-12-19 | End: 2024-12-18

## 2023-12-19 RX ORDER — LISINOPRIL 10 MG/1
10 TABLET ORAL DAILY
Qty: 90 TABLET | Refills: 3 | Status: SHIPPED | OUTPATIENT
Start: 2023-12-19 | End: 2023-12-22 | Stop reason: SDUPTHER

## 2023-12-19 RX ORDER — ROSUVASTATIN CALCIUM 10 MG/1
10 TABLET, COATED ORAL DAILY
Qty: 90 TABLET | Refills: 3 | Status: SHIPPED | OUTPATIENT
Start: 2023-12-19 | End: 2023-12-22 | Stop reason: SDUPTHER

## 2023-12-19 RX ORDER — GLIMEPIRIDE 4 MG/1
4 TABLET ORAL
Qty: 90 TABLET | Refills: 3 | Status: SHIPPED | OUTPATIENT
Start: 2023-12-19 | End: 2023-12-22 | Stop reason: SDUPTHER

## 2023-12-19 RX ORDER — METFORMIN HYDROCHLORIDE 1000 MG/1
1000 TABLET ORAL
COMMUNITY
Start: 2019-05-29 | End: 2024-05-01 | Stop reason: SDUPTHER

## 2023-12-19 ASSESSMENT — ENCOUNTER SYMPTOMS
HALLUCINATIONS: 0
VOICE CHANGE: 0
CONFUSION: 0
AGITATION: 0
FLANK PAIN: 0
SORE THROAT: 0
DYSPHORIC MOOD: 0
HYPERTENSION: 1
COUGH: 0
HEADACHES: 0
FEVER: 0
HEMATURIA: 0
NAUSEA: 0
BRUISES/BLEEDS EASILY: 0
NECK STIFFNESS: 0
VOMITING: 0
BLOOD IN STOOL: 0
RHINORRHEA: 0
DIARRHEA: 0
CHEST TIGHTNESS: 0
SINUS PRESSURE: 0
NERVOUS/ANXIOUS: 0
WHEEZING: 0
EYE ITCHING: 0
SHORTNESS OF BREATH: 0
FATIGUE: 1
DIAPHORESIS: 0
LIGHT-HEADEDNESS: 0
ACTIVITY CHANGE: 0
EYE PAIN: 0
DYSURIA: 0
EYE DISCHARGE: 0
DIZZINESS: 0
TROUBLE SWALLOWING: 0
POLYDIPSIA: 0
SPEECH DIFFICULTY: 0
CONSTIPATION: 0
ABDOMINAL DISTENTION: 0
ADENOPATHY: 0
BACK PAIN: 0
FREQUENCY: 0
MYALGIAS: 0
TREMORS: 0
FACIAL ASYMMETRY: 0
APPETITE CHANGE: 0
SLEEP DISTURBANCE: 0
PHOTOPHOBIA: 0
NECK PAIN: 0
WOUND: 0
SEIZURES: 0
ARTHRALGIAS: 1
CHOKING: 0
WEAKNESS: 0
EYE REDNESS: 0
CHILLS: 0
UNEXPECTED WEIGHT CHANGE: 0
ABDOMINAL PAIN: 0
NUMBNESS: 0
JOINT SWELLING: 0
PALPITATIONS: 0

## 2023-12-19 ASSESSMENT — PATIENT HEALTH QUESTIONNAIRE - PHQ9
SUM OF ALL RESPONSES TO PHQ9 QUESTIONS 1 AND 2: 0
2. FEELING DOWN, DEPRESSED OR HOPELESS: NOT AT ALL
1. LITTLE INTEREST OR PLEASURE IN DOING THINGS: NOT AT ALL

## 2023-12-19 NOTE — PROGRESS NOTES
Subjective   Patient ID: Melchor Bergeron is a 63 y.o. male who presents for Annual Exam (And review labs) and Knee Pain (Bilt, ongoing ).    Subjective  Melchor Bergeron is a 63 y.o. male and is here for a comprehensive physical exam. The patient reports knee pain left worse than right chronic.  Is ready for Ortho eval.  Has been without diabetic medications for several months cannot remember if it is that glimepiride or the Steglatro..    Do you take any herbs or supplements that were not prescribed by a doctor? no  Are you taking calcium supplements? no  Are you taking aspirin daily? no      History:  Date last PSA: Never      Diabetes  He presents for his follow-up diabetic visit. He has type 2 diabetes mellitus. His disease course has been worsening. Pertinent negatives for hypoglycemia include no confusion, dizziness, headaches, nervousness/anxiousness, seizures, speech difficulty or tremors. Associated symptoms include fatigue. Pertinent negatives for diabetes include no chest pain, no foot paresthesias, no polydipsia, no polyuria and no weakness. Symptoms are stable. Risk factors for coronary artery disease include diabetes mellitus, dyslipidemia, hypertension and male sex. Current diabetic treatment includes oral agent (triple therapy). He is compliant with treatment none of the time. His weight is stable. He is following a generally healthy diet. When asked about meal planning, he reported none. He has not had a previous visit with a dietitian. He participates in exercise intermittently. His home blood glucose trend is increasing rapidly. An ACE inhibitor/angiotensin II receptor blocker is being taken.   Hypertension  This is a chronic problem. The current episode started more than 1 year ago. The problem is unchanged. The problem is controlled. Associated symptoms include malaise/fatigue. Pertinent negatives include no chest pain, headaches, neck pain, palpitations or shortness of breath. There are no  associated agents to hypertension. Risk factors for coronary artery disease include diabetes mellitus, dyslipidemia and male gender. Past treatments include ACE inhibitors. The current treatment provides significant improvement. There is no history of a hypertension causing med or a thyroid problem.   Hyperlipidemia  This is a chronic problem. The current episode started more than 1 year ago. The problem is controlled. Recent lipid tests were reviewed and are variable. Exacerbating diseases include diabetes. Pertinent negatives include no chest pain, myalgias or shortness of breath. Current antihyperlipidemic treatment includes statins. The current treatment provides significant improvement of lipids. There are no compliance problems.         Review of Systems   Constitutional:  Positive for fatigue and malaise/fatigue. Negative for activity change, appetite change, chills, diaphoresis, fever and unexpected weight change.   HENT:  Negative for congestion, ear pain, hearing loss, nosebleeds, postnasal drip, rhinorrhea, sinus pressure, sneezing, sore throat, tinnitus, trouble swallowing and voice change.    Eyes:  Negative for photophobia, pain, discharge, redness, itching and visual disturbance.   Respiratory:  Negative for cough, choking, chest tightness, shortness of breath and wheezing.    Cardiovascular:  Negative for chest pain, palpitations and leg swelling.   Gastrointestinal:  Negative for abdominal distention, abdominal pain, blood in stool, constipation, diarrhea, nausea and vomiting.   Endocrine: Negative for cold intolerance, heat intolerance, polydipsia and polyuria.   Genitourinary:  Negative for dysuria, flank pain, frequency, hematuria and urgency.   Musculoskeletal:  Positive for arthralgias. Negative for back pain, joint swelling, myalgias, neck pain and neck stiffness.   Skin:  Negative for rash and wound.   Allergic/Immunologic: Negative for immunocompromised state.   Neurological:  Negative for  "dizziness, tremors, seizures, syncope, facial asymmetry, speech difficulty, weakness, light-headedness, numbness and headaches.   Hematological:  Negative for adenopathy. Does not bruise/bleed easily.   Psychiatric/Behavioral:  Negative for agitation, behavioral problems, confusion, dysphoric mood, hallucinations, self-injury, sleep disturbance and suicidal ideas. The patient is not nervous/anxious.        Objective   /80   Pulse 79   Temp 36.5 °C (97.7 °F) (Temporal)   Resp 18   Ht 1.753 m (5' 9\")   Wt 77.6 kg (171 lb)   SpO2 98%   BMI 25.25 kg/m²     Physical Exam  Constitutional:       General: He is not in acute distress.     Appearance: He is not ill-appearing or diaphoretic.   HENT:      Head: Normocephalic and atraumatic.      Right Ear: External ear normal.      Left Ear: External ear normal.      Nose: Nose normal. No rhinorrhea.   Eyes:      General: Lids are normal. No scleral icterus.        Right eye: No discharge.         Left eye: No discharge.      Conjunctiva/sclera: Conjunctivae normal.   Cardiovascular:      Rate and Rhythm: Normal rate and regular rhythm.      Pulses: Normal pulses.      Heart sounds: No murmur heard.  Pulmonary:      Effort: Pulmonary effort is normal. No respiratory distress.      Breath sounds: No decreased breath sounds, wheezing, rhonchi or rales.   Abdominal:      General: Bowel sounds are normal. There is no distension.      Palpations: Abdomen is soft. There is no mass.      Tenderness: There is no abdominal tenderness. There is no guarding or rebound.   Musculoskeletal:         General: No swelling or tenderness.      Cervical back: No rigidity or tenderness.      Right lower leg: No edema.      Left lower leg: No edema.      Comments: Diffuse arthritic deformities noted   Lymphadenopathy:      Cervical: No cervical adenopathy.      Upper Body:      Right upper body: No supraclavicular adenopathy.      Left upper body: No supraclavicular adenopathy. "   Skin:     General: Skin is warm and dry.      Coloration: Skin is not jaundiced or pale.      Findings: No erythema, lesion or rash.   Neurological:      General: No focal deficit present.      Mental Status: He is alert and oriented to person, place, and time.      Sensory: No sensory deficit.      Motor: No weakness or tremor.      Coordination: Coordination normal.      Gait: Gait normal.   Psychiatric:         Mood and Affect: Mood normal. Affect is not inappropriate.         Behavior: Behavior normal.         Assessment/Plan   Diagnoses and all orders for this visit:  Routine general medical examination at a health care facility  -     Follow Up In [facility] - PCP - Established; Future  Primary hypertension  -     Follow Up In [facility] - PCP - Established; Future  -     lisinopril 10 mg tablet; Take 1 tablet (10 mg) by mouth once daily.  -     Albumin , Urine Random; Future  -     CBC and Auto Differential; Future  -     Comprehensive Metabolic Panel; Future  -     Lipid Panel; Future  -     Magnesium; Future  -     TSH with reflex to Free T4 if abnormal; Future  Primary osteoarthritis of left knee  -     Referral to Orthopaedic Surgery; Future  -     Follow Up In [facility] - PCP - Established; Future  Chronic pain of left knee  -     Referral to Orthopaedic Surgery; Future  -     Follow Up In [facility] - PCP - Established; Future  Mixed hyperlipidemia  -     rosuvastatin (Crestor) 10 mg tablet; Take 1 tablet (10 mg) by mouth once daily.  -     Follow Up In [facility] - PCP - Established; Future  -     Comprehensive Metabolic Panel; Future  -     Lipid Panel; Future  -     TSH with reflex to Free T4 if abnormal; Future  Encounter for immunization  -     Follow Up In [facility] - PCP - Established; Future  Gastroesophageal reflux disease, unspecified whether esophagitis present  -     famotidine (Pepcid) 20 mg tablet; Take 1 tablet (20 mg)  by mouth every 12 hours.  -     Follow Up In [facility] - PCP - Established; Future  -     CBC and Auto Differential; Future  -     Comprehensive Metabolic Panel; Future  -     Magnesium; Future  Type 2 diabetes mellitus with microalbuminuria, without long-term current use of insulin (CMS/[facility])  -     ertugliflozin (Steglatro) 15 mg tablet; Take 1 tablet (15 mg) by mouth once daily.  -     rosuvastatin (Crestor) 10 mg tablet; Take 1 tablet (10 mg) by mouth once daily.  -     Follow Up In [facility] - PCP - Established; Future  -     Follow Up In [facility] - Pharmacy; Future  -     lisinopril 10 mg tablet; Take 1 tablet (10 mg) by mouth once daily.  -     glimepiride (Amaryl) 4 mg tablet; Take 1 tablet (4 mg) by mouth once daily in the morning. Take before meals.  -     Albumin , Urine Random; Future  -     CBC and Auto Differential; Future  -     Comprehensive Metabolic Panel; Future  -     Hemoglobin A1C; Future  -     Lipid Panel; Future  -     Magnesium; Future  -     TSH with reflex to Free T4 if abnormal; Future  Special screening examination for neoplasm of prostate  -     Prostate Specific Antigen, Screen; Future  Will likely need to initiate insulin  2. Patient Counseling:  --Nutrition: Stressed importance of moderation in sodium/caffeine intake, saturated fat and cholesterol, caloric balance, sufficient intake of fresh fruits, vegetables, fiber, calcium, iron.  --Exercise: Stressed the importance of regular exercise.   --Substance Abuse: Discussed cessation/primary prevention of tobacco, alcohol, or other drug use; driving or other dangerous activities under the influence; availability of treatment for abuse.   --Injury prevention: Discussed safety belts, safety helmets, smoke detector, smoking near bedding or upholstery.   --Dental health: Discussed importance of regular tooth brushing, flossing, and dental visits.  --Immunizations reviewed.  --Discussed benefits of  screening colonoscopy.  3. Discussed the patient's BMI with him.  The BMI is in the acceptable range.  4. Follow up 3 months with labs

## 2023-12-21 DIAGNOSIS — E11.29 TYPE 2 DIABETES MELLITUS WITH MICROALBUMINURIA, WITHOUT LONG-TERM CURRENT USE OF INSULIN (MULTI): ICD-10-CM

## 2023-12-21 DIAGNOSIS — E78.2 MIXED HYPERLIPIDEMIA: ICD-10-CM

## 2023-12-21 DIAGNOSIS — K21.9 GASTROESOPHAGEAL REFLUX DISEASE, UNSPECIFIED WHETHER ESOPHAGITIS PRESENT: ICD-10-CM

## 2023-12-21 DIAGNOSIS — I10 PRIMARY HYPERTENSION: ICD-10-CM

## 2023-12-21 DIAGNOSIS — R80.9 TYPE 2 DIABETES MELLITUS WITH MICROALBUMINURIA, WITHOUT LONG-TERM CURRENT USE OF INSULIN (MULTI): ICD-10-CM

## 2023-12-21 NOTE — TELEPHONE ENCOUNTER
[facility] called patient and said they wouldn't have the prescription to him soon because of the holidays and there is a delay.  They stated the contacted our office to get short term supply until medication has been received. I don't see any messages in the system.    Please send over short terms supply the local pharmacy  Steglatro, Crestor, Pepcid, Lisinopril, Amaryl.    [facility] in [place] on [address]

## 2023-12-22 RX ORDER — ROSUVASTATIN CALCIUM 10 MG/1
10 TABLET, COATED ORAL DAILY
Qty: 14 TABLET | Refills: 0 | Status: SHIPPED | OUTPATIENT
Start: 2023-12-22 | End: 2024-01-11 | Stop reason: SDUPTHER

## 2023-12-22 RX ORDER — FAMOTIDINE 20 MG/1
20 TABLET, FILM COATED ORAL EVERY 12 HOURS
Qty: 28 TABLET | Refills: 0 | Status: SHIPPED | OUTPATIENT
Start: 2023-12-22

## 2023-12-22 RX ORDER — GLIMEPIRIDE 4 MG/1
4 TABLET ORAL
Qty: 14 TABLET | Refills: 0 | Status: SHIPPED | OUTPATIENT
Start: 2023-12-22 | End: 2024-01-11 | Stop reason: SDUPTHER

## 2023-12-22 RX ORDER — LISINOPRIL 10 MG/1
10 TABLET ORAL DAILY
Qty: 14 TABLET | Refills: 0 | Status: SHIPPED | OUTPATIENT
Start: 2023-12-22 | End: 2024-01-11 | Stop reason: SDUPTHER

## 2024-01-09 ENCOUNTER — TELEMEDICINE (OUTPATIENT)
Dept: PHARMACY | Facility: HOSPITAL | Age: 64
End: 2024-01-09
Payer: COMMERCIAL

## 2024-01-09 DIAGNOSIS — E11.29 TYPE 2 DIABETES MELLITUS WITH MICROALBUMINURIA, WITHOUT LONG-TERM CURRENT USE OF INSULIN (MULTI): ICD-10-CM

## 2024-01-09 DIAGNOSIS — R80.9 TYPE 2 DIABETES MELLITUS WITH MICROALBUMINURIA, WITHOUT LONG-TERM CURRENT USE OF INSULIN (MULTI): ICD-10-CM

## 2024-01-09 NOTE — PROGRESS NOTES
Pharmacist Clinic: Diabetes Management  Melchor Bergeron is a 63 y.o. male was referred to Clinical Pharmacy Team for diabetes management.     Referring Provider: Alex Britt DO     HISTORY OF PRESENT ILLNESS  Spoke with patient today regarding diabetes management. Patient reports compliance with medication therapy. Patient using pill box (fills once monthly). Reviewed all allergies, medications and lab results with patient. Patient states he has enalapril 5 mg at home - advised patient to discontinue as patient was switched to lisinopril 10 mg daily at last PCP appointment.    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    LAB REVIEW   Glucose (mg/dL)   Date Value   12/15/2023 146 (H)   12/04/2020 233 (H)     Hemoglobin A1C (%)   Date Value   12/15/2023 15.0 (H)   12/04/2020 12.5     Bicarbonate (mmol/L)   Date Value   12/15/2023 26   12/04/2020 25     Urea Nitrogen (mg/dL)   Date Value   12/15/2023 21   12/04/2020 30 (H)     Creatinine (mg/dL)   Date Value   12/15/2023 1.28   12/04/2020 1.64 (H)     Lab Results   Component Value Date    HGBA1C 15.0 (H) 12/15/2023    HGBA1C 12.5 12/04/2020     Lab Results   Component Value Date    LDLCALC 76 12/15/2023    CREATININE 1.28 12/15/2023     Lab Results   Component Value Date    CHOL 147 12/15/2023    CHOL 206 (H) 12/04/2020     Lab Results   Component Value Date    HDL 57.1 12/15/2023    HDL 56.0  "12/04/2020     Lab Results   Component Value Date    LDLCALC 76 12/15/2023     Lab Results   Component Value Date    TRIG 72 12/15/2023    TRIG 81 12/04/2020     No components found for: \"CHOLHDL\"  Lab Results   Component Value Date    LDLCALC 76 12/15/2023       DIABETES ASSESSMENT    CURRENT PHARMACOTHERAPY  - glimepiride 4 mg daily  - metformin 1000 mg daily (sometimes takes in the evening if remembers)  - Steglatro 15 mg daily     SECONDARY PREVENTION  - Statin? Yes - rosuvastatin 10 mg  - ACE-I/ARB? Yes - lisinopril 10 mg  - Aspirin? Yes  - Wears glasses (has dry eyes and uses eye drops); yearly check-ins  - No podiatrist or endocrinologist; checks feet daily    HISTORICAL PHARMACOTHERAPY  - Jardiance (expensive)    SMBG MEASUREMENTS  Accu-Chek glucometer  - Checks BID, then stops for a few days, and then will check again (mostly breakfast and dinner)  - 104,145, 226, 185, 210, 143    Patient does not report symptoms of hypoglycemia.  Patient does report symptoms of hyperglycemia. Patient reportsexcessive thirst and polyuria.    RECOMMENDATIONS/PLAN  1. Patients diabetes is poorly controlled with most recent A1c of 15% (goal < 7 %).   - Continue all meds under the continuation of care with the referring provider and clinical pharmacy team.  - Initiate: Rybelsus 3 mg daily 30 minutes before food/water/drink/medications. [facility] to submit prior authorization.    2. Education:   - Patient reports unwilling to use injectable medications; explained importance of glycemic control/need for insulin to reduce risk of heart attack, stroke, etc. Patient is agreeable to try Rybelsus for glycemic control.  - Patient only taking metformin 1000 mg daily (sometimes 1000 mg BID); will encourage taking 2 tablets together in the morning to increase patient compliance  - Patient also reports Steglatro is expensive ~$149 for 3 months; will investigate other options/coupons  - Explained [facility] PAP; patient does not believe he will qualify " based on slightly above income restrictions.  - Counseled patient on MOA, expectations, side effects, duration of therapy, contraindications, administration, and monitoring parameters  - Answered all patient questions and concerns; provided [facility] phone number if issues/questions arise    Clinical Pharmacist follow up: 1/17/2024 @ 4:30 pm    Thank you,  Jo-Ann Silva, PharmD    Verbal consent to manage patient's drug therapy was obtained from patient. They were informed they may decline to participate or withdraw from participation in pharmacy services at any time.

## 2024-01-11 DIAGNOSIS — E11.29 TYPE 2 DIABETES MELLITUS WITH MICROALBUMINURIA, WITHOUT LONG-TERM CURRENT USE OF INSULIN (MULTI): ICD-10-CM

## 2024-01-11 DIAGNOSIS — E78.2 MIXED HYPERLIPIDEMIA: ICD-10-CM

## 2024-01-11 DIAGNOSIS — R80.9 TYPE 2 DIABETES MELLITUS WITH MICROALBUMINURIA, WITHOUT LONG-TERM CURRENT USE OF INSULIN (MULTI): ICD-10-CM

## 2024-01-11 DIAGNOSIS — I10 PRIMARY HYPERTENSION: ICD-10-CM

## 2024-01-11 RX ORDER — GLIMEPIRIDE 4 MG/1
4 TABLET ORAL
Qty: 14 TABLET | Refills: 0 | Status: SHIPPED | OUTPATIENT
Start: 2024-01-11 | End: 2024-05-01 | Stop reason: SDUPTHER

## 2024-01-11 RX ORDER — LISINOPRIL 10 MG/1
10 TABLET ORAL DAILY
Qty: 14 TABLET | Refills: 0 | Status: SHIPPED | OUTPATIENT
Start: 2024-01-11 | End: 2024-05-01 | Stop reason: SDUPTHER

## 2024-01-11 RX ORDER — ROSUVASTATIN CALCIUM 10 MG/1
10 TABLET, COATED ORAL DAILY
Qty: 14 TABLET | Refills: 0 | Status: SHIPPED | OUTPATIENT
Start: 2024-01-11 | End: 2024-03-19 | Stop reason: SDUPTHER

## 2024-01-15 ENCOUNTER — OFFICE VISIT (OUTPATIENT)
Dept: ORTHOPEDIC SURGERY | Facility: CLINIC | Age: 64
End: 2024-01-15
Payer: COMMERCIAL

## 2024-01-15 ENCOUNTER — ANCILLARY PROCEDURE (OUTPATIENT)
Dept: RADIOLOGY | Facility: CLINIC | Age: 64
End: 2024-01-15
Payer: COMMERCIAL

## 2024-01-15 VITALS — HEIGHT: 69 IN | BODY MASS INDEX: 25.33 KG/M2 | WEIGHT: 171 LBS

## 2024-01-15 DIAGNOSIS — G89.29 CHRONIC PAIN OF LEFT KNEE: ICD-10-CM

## 2024-01-15 DIAGNOSIS — M25.562 CHRONIC PAIN OF LEFT KNEE: ICD-10-CM

## 2024-01-15 DIAGNOSIS — M25.562 PAIN IN BOTH KNEES, UNSPECIFIED CHRONICITY: ICD-10-CM

## 2024-01-15 DIAGNOSIS — M17.0 PRIMARY OSTEOARTHRITIS OF BOTH KNEES: Primary | ICD-10-CM

## 2024-01-15 DIAGNOSIS — M25.561 PAIN IN BOTH KNEES, UNSPECIFIED CHRONICITY: ICD-10-CM

## 2024-01-15 DIAGNOSIS — M17.12 PRIMARY OSTEOARTHRITIS OF LEFT KNEE: ICD-10-CM

## 2024-01-15 PROCEDURE — 20610 DRAIN/INJ JOINT/BURSA W/O US: CPT | Mod: 50 | Performed by: STUDENT IN AN ORGANIZED HEALTH CARE EDUCATION/TRAINING PROGRAM

## 2024-01-15 PROCEDURE — 73564 X-RAY EXAM KNEE 4 OR MORE: CPT | Mod: BILATERAL PROCEDURE | Performed by: STUDENT IN AN ORGANIZED HEALTH CARE EDUCATION/TRAINING PROGRAM

## 2024-01-15 PROCEDURE — 1036F TOBACCO NON-USER: CPT | Performed by: STUDENT IN AN ORGANIZED HEALTH CARE EDUCATION/TRAINING PROGRAM

## 2024-01-15 PROCEDURE — 99214 OFFICE O/P EST MOD 30 MIN: CPT | Performed by: STUDENT IN AN ORGANIZED HEALTH CARE EDUCATION/TRAINING PROGRAM

## 2024-01-15 PROCEDURE — 4010F ACE/ARB THERAPY RXD/TAKEN: CPT | Performed by: STUDENT IN AN ORGANIZED HEALTH CARE EDUCATION/TRAINING PROGRAM

## 2024-01-15 PROCEDURE — 2500000004 HC RX 250 GENERAL PHARMACY W/ HCPCS (ALT 636 FOR OP/ED): Performed by: STUDENT IN AN ORGANIZED HEALTH CARE EDUCATION/TRAINING PROGRAM

## 2024-01-15 PROCEDURE — 99204 OFFICE O/P NEW MOD 45 MIN: CPT | Performed by: STUDENT IN AN ORGANIZED HEALTH CARE EDUCATION/TRAINING PROGRAM

## 2024-01-15 PROCEDURE — 2500000005 HC RX 250 GENERAL PHARMACY W/O HCPCS: Performed by: STUDENT IN AN ORGANIZED HEALTH CARE EDUCATION/TRAINING PROGRAM

## 2024-01-15 PROCEDURE — 73564 X-RAY EXAM KNEE 4 OR MORE: CPT | Mod: 50

## 2024-01-15 RX ORDER — LIDOCAINE HYDROCHLORIDE 10 MG/ML
4 INJECTION INFILTRATION; PERINEURAL
Status: COMPLETED | OUTPATIENT
Start: 2024-01-15 | End: 2024-01-15

## 2024-01-15 RX ORDER — TRIAMCINOLONE ACETONIDE 40 MG/ML
40 INJECTION, SUSPENSION INTRA-ARTICULAR; INTRAMUSCULAR
Status: COMPLETED | OUTPATIENT
Start: 2024-01-15 | End: 2024-01-15

## 2024-01-15 RX ADMIN — TRIAMCINOLONE ACETONIDE 40 MG: 40 INJECTION, SUSPENSION INTRA-ARTICULAR; INTRAMUSCULAR at 17:30

## 2024-01-15 RX ADMIN — LIDOCAINE HYDROCHLORIDE 4 ML: 10 INJECTION, SOLUTION INFILTRATION; PERINEURAL at 17:30

## 2024-01-15 ASSESSMENT — PAIN SCALES - GENERAL: PAINLEVEL_OUTOF10: 6

## 2024-01-15 ASSESSMENT — PAIN - FUNCTIONAL ASSESSMENT: PAIN_FUNCTIONAL_ASSESSMENT: 0-10

## 2024-01-15 NOTE — PROGRESS NOTES
Chief Complaint   Patient presents with    Right Knee - New Patient Visit     B/l knee pain   DOI - on going pain   Xray today     Left Knee - New Patient Visit     B/l knee pain   DOI - on going pain   Xray today        History of Present Illness  Patient presents with bilateral knee pain for several years.  The patient localizes the pain diffusely.  Recently there has been concern for falls and instability.  There is increasing difficulty with activities of daily living and significant disability related to the knee pain.  The patient endorses the following failed non-operative treatments: Activity modifications prior cortisone injections.   There is increasing frustration with persistent pain and swelling and decreasing distance of ambulation.  Pain is moderate, achy, diffuse.  Better with rest, worse with activity.      Patient continues to have severe pain about bilateral knees.  Hoping to get injections today.  States the right is worse than the left     Review of Systems   GENERAL: Negative for malaise, significant weight loss, fever  MUSCULOSKELETAL: see HPI  NEURO:  Negative     Exam  Bilateral knee:  Skin healthy and intact  No gross swelling or ecchymosis  Alignment: Varus      effusion: Mild        ROM: 10-1 10  Crepitance with range of motion  No pain with internal rotation of the hip  Tenderness to palpation over medial and lateral joint line and with patellar compression      No laxity to valgus stress  No laxity to varus stress  Negative Lachman´s test  Negative posterior drawer test  Mild pain with Danielle´s test  Neurovascular exam normal distally  2+ DP pulse and good cap refill     Radiographs  XR knee 4+ views bilateral    Result Date: 1/15/2024  Interpreted By:  German Hughes III, STUDY: XR KNEE 4+ VIEWS BILATERAL; ;  1/15/2024 2:58 pm   INDICATION: Signs/Symptoms:pain.   COMPARISON: None.   ACCESSION NUMBER(S): [id number]   ORDERING CLINICIAN: GERMAN HUGHES   FINDINGS: Bilateral  weight-bearing views knees: Severe osteophytosis sclerosis joint space narrowing the medial compartment as well as about the patellofemoral space. There is osteophytosis and sclerosis as well as subchondral cystic change consistent with primary arthritis. No acute osseous abnormality no fracture or dislocation appreciated       Severe bilateral knee arthritis     MACRO: None   Signed by: Florentin Marti III 1/15/2024 5:09 PM Dictation workstation:   [id number]     L Inj/Asp: bilateral knee on 1/15/2024 5:30 PM  Indications: pain  Details: 22 G needle, anterolateral approach  Medications (Right): 40 mg triamcinolone acetonide 40 mg/mL; 4 mL lidocaine 10 mg/mL (1 %)  Medications (Left): 40 mg triamcinolone acetonide 40 mg/mL; 4 mL lidocaine 10 mg/mL (1 %)  Consent was given by the patient. Immediately prior to procedure a time out was called to verify the correct patient, procedure, equipment, support staff and site/side marked as required. Patient was prepped and draped in the usual sterile fashion.           Assessment  Patient with severe bilateral knee osteoarthritis     Plan  We discussed with the patient the diagnosis of degenerative joint disease of the knee.  We reviewed an evidence-based approach to osteoarthritis of the knee.  We strongly encouraged low-impact aerobic activity and non-opioid analgesics.     We discussed temporary pain relief with corticosteroid injections and the associated risks.  We also discussed the conflicting evidence regarding viscosupplementation and potential long-term risks with NSAID´s.  We reviewed the role of bracing for instability and physical therapy for atrophy and gait abnormalities.  The patient elected for bilateral cortisone injections today    I discussed risks and benefits of corticosteroid injection and the patient would like to proceed.  Discussed risks of skin depigmentation and the rare but possible intravascular injection of local anesthetic which can cause  palpitations or arrhythmias. I discussed the possibility of a transient increase in blood sugar. I explained that the local anesthetic tends to work immediately, it may take 2-3 days for the full effect of the corticosteroid compound. Consent was obtained and side confirmed by the patient.      If patient fails to improve may benefit from gel injection therapy.

## 2024-01-17 ENCOUNTER — TELEMEDICINE (OUTPATIENT)
Dept: PHARMACY | Facility: HOSPITAL | Age: 64
End: 2024-01-17
Payer: COMMERCIAL

## 2024-01-17 DIAGNOSIS — R80.9 TYPE 2 DIABETES MELLITUS WITH MICROALBUMINURIA, WITHOUT LONG-TERM CURRENT USE OF INSULIN (MULTI): ICD-10-CM

## 2024-01-17 DIAGNOSIS — E11.29 TYPE 2 DIABETES MELLITUS WITH MICROALBUMINURIA, WITHOUT LONG-TERM CURRENT USE OF INSULIN (MULTI): ICD-10-CM

## 2024-01-17 NOTE — PROGRESS NOTES
"Pharmacist Clinic: Diabetes Management  Melchor Bergeron is a 63 y.o. male was referred to Clinical Pharmacy Team for diabetes management.     Referring Provider: Alex Britt DO     HISTORY OF PRESENT ILLNESS  Spoke with patient today regarding diabetes management. Patient reports compliance with medication therapy. Patient denies any concerns at this time. Patient switching peanut butter brands with less carbs.    LAB REVIEW   Glucose (mg/dL)   Date Value   12/15/2023 146 (H)   12/04/2020 233 (H)     Hemoglobin A1C (%)   Date Value   12/15/2023 15.0 (H)   12/04/2020 12.5     Bicarbonate (mmol/L)   Date Value   12/15/2023 26   12/04/2020 25     Urea Nitrogen (mg/dL)   Date Value   12/15/2023 21   12/04/2020 30 (H)     Creatinine (mg/dL)   Date Value   12/15/2023 1.28   12/04/2020 1.64 (H)     Lab Results   Component Value Date    HGBA1C 15.0 (H) 12/15/2023    HGBA1C 12.5 12/04/2020     Lab Results   Component Value Date    LDLCALC 76 12/15/2023    CREATININE 1.28 12/15/2023     Lab Results   Component Value Date    CHOL 147 12/15/2023    CHOL 206 (H) 12/04/2020     Lab Results   Component Value Date    HDL 57.1 12/15/2023    HDL 56.0 12/04/2020     Lab Results   Component Value Date    LDLCALC 76 12/15/2023     Lab Results   Component Value Date    TRIG 72 12/15/2023    TRIG 81 12/04/2020     No components found for: \"CHOLHDL\"  Lab Results   Component Value Date    LDLCALC 76 12/15/2023       DIABETES ASSESSMENT    CURRENT PHARMACOTHERAPY  - glimepiride 4 mg daily  - metformin 1000 mg daily (sometimes takes in the evening if remembers)  - Steglatro 15 mg daily     SECONDARY PREVENTION  - Statin? Yes - rosuvastatin 10 mg  - ACE-I/ARB? Yes - lisinopril 10 mg  - Aspirin? Yes  - Wears glasses (has dry eyes and uses eye drops); yearly check-ins  - No podiatrist or endocrinologist; checks feet daily    HISTORICAL PHARMACOTHERAPY  - Jardiance (expensive)    SMBG MEASUREMENTS  Accu-Chek glucometer  - No readings provided " at time of appt.    Patient does not report symptoms of hypoglycemia.  Patient does report symptoms of hyperglycemia. Patient reportsexcessive thirst and polyuria.    RECOMMENDATIONS/PLAN  1. Patients diabetes is poorly controlled with most recent A1c of 15% (goal < 7 %).   - Initiate: Rybelsus 3 mg daily 30 minutes before food/water/drink/medications. Prescription sent to [facility] in [place].  - Continue all meds under the continuation of care with the referring provider and clinical pharmacy team.    2. Education:   - Patient reports unwilling to use injectable medications; explained importance of glycemic control/need for insulin to reduce risk of heart attack, stroke, etc. Patient is agreeable to try Rybelsus for glycemic control.  - Patient only taking metformin 1000 mg daily (sometimes 1000 mg BID); will encourage taking 2 tablets together in the morning to increase patient compliance  - Patient also reports Steglatro is expensive ~$149 for 3 months; will investigate other options/coupons  - Explained  PAP; patient does not believe he will qualify based on slightly above income restrictions.  - Counseled patient on MOA, expectations, side effects, duration of therapy, contraindications, administration, and monitoring parameters  - Answered all patient questions and concerns; provided [facility] phone number if issues/questions arise    Clinical Pharmacist follow up: 2/7/2024 @ 4:30 pm    Thank you,  Jo-Ann Silva, Jacob[place]    Verbal consent to manage patient's drug therapy was obtained from patient. They were informed they may decline to participate or withdraw from participation in pharmacy services at any time.

## 2024-01-19 ENCOUNTER — OFFICE VISIT (OUTPATIENT)
Dept: ORTHOPEDIC SURGERY | Facility: CLINIC | Age: 64
End: 2024-01-19
Payer: COMMERCIAL

## 2024-01-19 ENCOUNTER — HOSPITAL ENCOUNTER (INPATIENT)
Facility: HOSPITAL | Age: 64
End: 2024-01-19
Attending: STUDENT IN AN ORGANIZED HEALTH CARE EDUCATION/TRAINING PROGRAM | Admitting: STUDENT IN AN ORGANIZED HEALTH CARE EDUCATION/TRAINING PROGRAM
Payer: COMMERCIAL

## 2024-01-19 DIAGNOSIS — M25.462 KNEE EFFUSION, LEFT: ICD-10-CM

## 2024-01-19 DIAGNOSIS — M00.9: Primary | ICD-10-CM

## 2024-01-19 DIAGNOSIS — M17.12 PRIMARY OSTEOARTHRITIS OF LEFT KNEE: ICD-10-CM

## 2024-01-19 PROBLEM — M00.862: Status: ACTIVE | Noted: 2024-01-19

## 2024-01-19 LAB
CLARITY FLD: ABNORMAL
COLOR FLD: ABNORMAL
CRYSTALS FLD MICRO: NORMAL
RBC # FLD AUTO: ABNORMAL /UL
WBC # FLD AUTO: ABNORMAL /UL

## 2024-01-19 PROCEDURE — 89050 BODY FLUID CELL COUNT: CPT | Performed by: STUDENT IN AN ORGANIZED HEALTH CARE EDUCATION/TRAINING PROGRAM

## 2024-01-19 PROCEDURE — 20610 DRAIN/INJ JOINT/BURSA W/O US: CPT | Mod: LT | Performed by: STUDENT IN AN ORGANIZED HEALTH CARE EDUCATION/TRAINING PROGRAM

## 2024-01-19 PROCEDURE — 89060 EXAM SYNOVIAL FLUID CRYSTALS: CPT | Performed by: STUDENT IN AN ORGANIZED HEALTH CARE EDUCATION/TRAINING PROGRAM

## 2024-01-19 PROCEDURE — 99024 POSTOP FOLLOW-UP VISIT: CPT | Performed by: STUDENT IN AN ORGANIZED HEALTH CARE EDUCATION/TRAINING PROGRAM

## 2024-01-19 PROCEDURE — 1036F TOBACCO NON-USER: CPT | Performed by: STUDENT IN AN ORGANIZED HEALTH CARE EDUCATION/TRAINING PROGRAM

## 2024-01-19 PROCEDURE — 87205 SMEAR GRAM STAIN: CPT | Performed by: STUDENT IN AN ORGANIZED HEALTH CARE EDUCATION/TRAINING PROGRAM

## 2024-01-19 PROCEDURE — 4010F ACE/ARB THERAPY RXD/TAKEN: CPT | Performed by: STUDENT IN AN ORGANIZED HEALTH CARE EDUCATION/TRAINING PROGRAM

## 2024-01-19 RX ORDER — SODIUM CHLORIDE, SODIUM LACTATE, POTASSIUM CHLORIDE, CALCIUM CHLORIDE 600; 310; 30; 20 MG/100ML; MG/100ML; MG/100ML; MG/100ML
100 INJECTION, SOLUTION INTRAVENOUS CONTINUOUS
Status: CANCELLED | OUTPATIENT
Start: 2024-01-19

## 2024-01-19 RX ORDER — ACETAMINOPHEN 325 MG/1
650 TABLET ORAL EVERY 4 HOURS PRN
Status: CANCELLED | OUTPATIENT
Start: 2024-01-19

## 2024-01-19 RX ORDER — ONDANSETRON 4 MG/1
4 TABLET, ORALLY DISINTEGRATING ORAL EVERY 8 HOURS PRN
Status: CANCELLED | OUTPATIENT
Start: 2024-01-19

## 2024-01-19 RX ORDER — TRAMADOL HYDROCHLORIDE 50 MG/1
50 TABLET ORAL EVERY 8 HOURS PRN
Qty: 15 TABLET | Refills: 0 | Status: SHIPPED | OUTPATIENT
Start: 2024-01-19 | End: 2024-01-31 | Stop reason: HOSPADM

## 2024-01-19 RX ORDER — NALOXONE HYDROCHLORIDE 0.4 MG/ML
0.2 INJECTION, SOLUTION INTRAMUSCULAR; INTRAVENOUS; SUBCUTANEOUS EVERY 5 MIN PRN
Status: CANCELLED | OUTPATIENT
Start: 2024-01-19

## 2024-01-19 RX ORDER — CEFAZOLIN SODIUM 2 G/50ML
2 SOLUTION INTRAVENOUS ONCE
Status: CANCELLED | OUTPATIENT
Start: 2024-01-19 | End: 2024-01-19

## 2024-01-19 RX ORDER — MORPHINE SULFATE 4 MG/ML
4 INJECTION, SOLUTION INTRAMUSCULAR; INTRAVENOUS EVERY 4 HOURS PRN
Status: CANCELLED | OUTPATIENT
Start: 2024-01-19

## 2024-01-19 RX ORDER — ONDANSETRON HYDROCHLORIDE 2 MG/ML
4 INJECTION, SOLUTION INTRAVENOUS EVERY 8 HOURS PRN
Status: CANCELLED | OUTPATIENT
Start: 2024-01-19

## 2024-01-19 RX ORDER — MORPHINE SULFATE 2 MG/ML
2 INJECTION, SOLUTION INTRAMUSCULAR; INTRAVENOUS EVERY 4 HOURS PRN
Status: CANCELLED | OUTPATIENT
Start: 2024-01-19

## 2024-01-19 NOTE — PROGRESS NOTES
Chief Complaint   Patient presents with    Right Knee - Follow-up      bilateral knee osteoarthritis  DOI- on going pain  S/p dipti inj 1/15/24    Left Knee - Follow-up      bilateral knee osteoarthritis  DOI- on going pain  S/p dipti inj 1/15/24       HPI  Patient presents today for follow up of of left knee.  Patient well-known to me with history of bilateral severe knee arthritis.  Did undergo cortisone injection on 1/15/2024.  States that about 48 hours after injection developed some increasingly severe swelling about his knee.  Did not have the severe swelling prior.  States the pain is sharp is difficult time getting comfortable due to pain and discomfort.      Physical exam    Past Medical History:   Diagnosis Date    Tendinitis 12/14/2023       Medication Documentation Review Audit       Reviewed by Glendy Perez MA (Medical Assistant) on 01/19/24 at 1320      Medication Order Taking? Sig Documenting Provider Last Dose Status   aspirin 81 mg EC tablet [id number] No Take 1 tablet (81 mg) by mouth once daily. Historical Provider, MD Taking Active   ertugliflozin (Steglatro) 15 mg tablet [id number]  Take 1 tablet (15 mg) by mouth once daily. Alex Britt, DO  Active   famotidine (Pepcid) 20 mg tablet [id number]  Take 1 tablet (20 mg) by mouth every 12 hours. Alex Britt DO  Active   glimepiride (Amaryl) 4 mg tablet [id number]  Take 1 tablet (4 mg) by mouth once daily in the morning. Take before meals. Alex Britt DO  Active   lisinopril 10 mg tablet [id number]  Take 1 tablet (10 mg) by mouth once daily. Alex Britt DO  Active   metFORMIN (Glucophage) 1,000 mg tablet [id number]  Take 1 tablet (1,000 mg) by mouth once daily with breakfast. Historical Provider, MD  Active   multivitamin with minerals iron-free (Vision Plus Lutein) [id number] No Take by mouth. Historical Provider, MD Taking Active   rosuvastatin (Crestor) 10 mg tablet [id number]  Take 1 tablet (10 mg) by mouth once daily.  Alex Britt, DO  Active   semaglutide (Rybelsus) 3 mg tablet [id number]  Take 1 tablet (3 mg) by mouth once daily. Alex Britt DO  Active                    Allergies   Allergen Reactions    Sulfa (Sulfonamide Antibiotics) Hives and Rash    Metformin Diarrhea       Social History     Socioeconomic History    Marital status:      Spouse name: Not on file    Number of children: Not on file    Years of education: Not on file    Highest education level: Not on file   Occupational History    Not on file   Tobacco Use    Smoking status: Never     Passive exposure: Never    Smokeless tobacco: Never   Substance and Sexual Activity    Alcohol use: Never    Drug use: Never    Sexual activity: Not on file   Other Topics Concern    Not on file   Social History Narrative    Not on file     Social Determinants of Health     Financial Resource Strain: Not on file   Food Insecurity: Not on file   Transportation Needs: Not on file   Physical Activity: Not on file   Stress: Not on file   Social Connections: Not on file   Intimate Partner Violence: Not on file   Housing Stability: Not on file       Past Surgical History:   Procedure Laterality Date    OTHER SURGICAL HISTORY  12/10/2020    Cataract surgery    OTHER SURGICAL HISTORY  12/10/2020    Tonsillectomy          Review of Systems   GENERAL: Negative  CV: NEGATIVE  RESPIRATORY: Negative  GI: Negative  MUSCULOSKELETAL: See HPI  SKIN: Negative  NEURO:  Negative     Physical Exam:  General: No acute distress alert and orient x3  HEENT: Normocephalic atraumatic.  Pupils round and reactive.  Trachea midline  Cardiovascular: Regular rate and rhythm.  Respiratory: Bilateral chest rise.  Breathing unlabored.  Abdomen: Nontender nondistended no rebounding.  See formal musculoskeletal below:    General: Alert and oriented to place, person, and time.  No acute distress and breathing comfortably; pleasant and cooperative with the examination.  Extremity:  Focused  examination left knee: Large effusion about the knee.  Positive short arc pain.  No appreciable erythema.  Dorsiflexion EHL plantarflexion intact.  Significant pain with range of motion of the knee.  Antalgic gait.    Diagnostics:  XR knee 4+ views bilateral    Result Date: 1/15/2024  Interpreted By:  German Hughes III, STUDY: XR KNEE 4+ VIEWS BILATERAL; ;  1/15/2024 2:58 pm   INDICATION: Signs/Symptoms:pain.   COMPARISON: None.   ACCESSION NUMBER(S): [id number]   ORDERING CLINICIAN: GERMAN HUGHES   FINDINGS: Bilateral weight-bearing views knees: Severe osteophytosis sclerosis joint space narrowing the medial compartment as well as about the patellofemoral space. There is osteophytosis and sclerosis as well as subchondral cystic change consistent with primary arthritis. No acute osseous abnormality no fracture or dislocation appreciated       Severe bilateral knee arthritis     MACRO: None   Signed by: German Hughes III 1/15/2024 5:09 PM Dictation workstation:   [id number]       Procedures  L Inj/Asp: L knee on 1/19/2024 4:42 PM  Indications: pain  Details: 18 G needle, anterolateral approach  Aspirate: 35 mL purulent; sent for lab analysis  Outcome: tolerated well, no immediate complications  Consent was given by the patient. Immediately prior to procedure a time out was called to verify the correct patient, procedure, equipment, support staff and site/side marked as required. Patient was prepped and draped in the usual sterile fashion.            Assessment:  63-year-old male with concerns for left knee septic arthropathy status post cortisone injection 4 days prior    Treatment plan:  Given gross appearance of aspirate and recent cortisone injection 4 days prior I do have high suspicion for Septic arthropathy.  Will obtain stat aspiration results.  Discussed this finding with the lab.  Will add patient on for surgery for irrigation and debridement arthroscopic left knee given gross findings and history.  Will  continue to monitor aspiration results as well as culture results  Discussed that this will require inpatient stay and coordination of IV antibiotics per infectious disease team.      The risks of surgery were discussed including but not limited to the risks of medications given for surgery, the risk of blood loss during and after surgery that can lead to the need for blood products in certain situations, infection, damage to normal structures that can lead to long term problems of pain or dysfunction, wound healing complications, the possibility of nonunion/malunion of any osteotomies and late or chronic pain as a result of the surgical intervention.  In addition potentially life threatening complications that can occur at the time of surgery and after surgery were discussed including but not limited to deep vein thrombosis, pulmonary embolism, myocardial infarction, stroke and death.  All of the patient's questions concerns answered

## 2024-01-20 ENCOUNTER — HOSPITAL ENCOUNTER (INPATIENT)
Facility: HOSPITAL | Age: 64
LOS: 11 days | Discharge: NURSING, REHAB OR LONG-TERM CARE | DRG: 856 | End: 2024-01-31
Attending: STUDENT IN AN ORGANIZED HEALTH CARE EDUCATION/TRAINING PROGRAM | Admitting: STUDENT IN AN ORGANIZED HEALTH CARE EDUCATION/TRAINING PROGRAM
Payer: COMMERCIAL

## 2024-01-20 ENCOUNTER — ANESTHESIA EVENT (OUTPATIENT)
Dept: OPERATING ROOM | Facility: HOSPITAL | Age: 64
DRG: 856 | End: 2024-01-20
Payer: COMMERCIAL

## 2024-01-20 ENCOUNTER — ANESTHESIA (OUTPATIENT)
Dept: OPERATING ROOM | Facility: HOSPITAL | Age: 64
DRG: 856 | End: 2024-01-20
Payer: COMMERCIAL

## 2024-01-20 DIAGNOSIS — E11.29 TYPE 2 DIABETES MELLITUS WITH MICROALBUMINURIA, WITHOUT LONG-TERM CURRENT USE OF INSULIN (MULTI): ICD-10-CM

## 2024-01-20 DIAGNOSIS — M00.862 ARTHRITIS DUE TO OTHER BACTERIA, LEFT KNEE (MULTI): ICD-10-CM

## 2024-01-20 DIAGNOSIS — M00.9 PYOGENIC ARTHRITIS OF LEFT KNEE JOINT, DUE TO UNSPECIFIED ORGANISM (MULTI): ICD-10-CM

## 2024-01-20 DIAGNOSIS — R80.9 TYPE 2 DIABETES MELLITUS WITH MICROALBUMINURIA, WITHOUT LONG-TERM CURRENT USE OF INSULIN (MULTI): ICD-10-CM

## 2024-01-20 DIAGNOSIS — A41.1: ICD-10-CM

## 2024-01-20 DIAGNOSIS — M00.262: ICD-10-CM

## 2024-01-20 DIAGNOSIS — T82.7XXA: ICD-10-CM

## 2024-01-20 DIAGNOSIS — M00.9: Primary | ICD-10-CM

## 2024-01-20 LAB
ANION GAP SERPL CALC-SCNC: 20 MMOL/L (ref 10–20)
BUN SERPL-MCNC: 31 MG/DL (ref 6–23)
CALCIUM SERPL-MCNC: 9.5 MG/DL (ref 8.6–10.3)
CHLORIDE SERPL-SCNC: 103 MMOL/L (ref 98–107)
CO2 SERPL-SCNC: 16 MMOL/L (ref 21–32)
CREAT SERPL-MCNC: 1.39 MG/DL (ref 0.5–1.3)
EGFRCR SERPLBLD CKD-EPI 2021: 57 ML/MIN/1.73M*2
ERYTHROCYTE [DISTWIDTH] IN BLOOD BY AUTOMATED COUNT: 14.2 % (ref 11.5–14.5)
GLUCOSE BLD MANUAL STRIP-MCNC: 116 MG/DL (ref 74–99)
GLUCOSE BLD MANUAL STRIP-MCNC: 206 MG/DL (ref 74–99)
GLUCOSE BLD MANUAL STRIP-MCNC: 251 MG/DL (ref 74–99)
GLUCOSE SERPL-MCNC: 144 MG/DL (ref 74–99)
HCT VFR BLD AUTO: 46.3 % (ref 41–52)
HGB BLD-MCNC: 15.6 G/DL (ref 13.5–17.5)
HOLD SPECIMEN: NORMAL
INR PPP: 1.2 (ref 0.9–1.1)
MCH RBC QN AUTO: 28.3 PG (ref 26–34)
MCHC RBC AUTO-ENTMCNC: 33.7 G/DL (ref 32–36)
MCV RBC AUTO: 84 FL (ref 80–100)
NRBC BLD-RTO: 0 /100 WBCS (ref 0–0)
PLATELET # BLD AUTO: 171 X10*3/UL (ref 150–450)
POTASSIUM SERPL-SCNC: 4.1 MMOL/L (ref 3.5–5.3)
PROTHROMBIN TIME: 13 SECONDS (ref 9.8–12.8)
RBC # BLD AUTO: 5.52 X10*6/UL (ref 4.5–5.9)
SODIUM SERPL-SCNC: 135 MMOL/L (ref 136–145)
VANCOMYCIN SERPL-MCNC: 16.4 UG/ML (ref 5–20)
WBC # BLD AUTO: 11.2 X10*3/UL (ref 4.4–11.3)

## 2024-01-20 PROCEDURE — 99222 1ST HOSP IP/OBS MODERATE 55: CPT

## 2024-01-20 PROCEDURE — 3600000002 HC OR TIME - INITIAL BASE CHARGE - PROCEDURE LEVEL TWO: Performed by: STUDENT IN AN ORGANIZED HEALTH CARE EDUCATION/TRAINING PROGRAM

## 2024-01-20 PROCEDURE — 85027 COMPLETE CBC AUTOMATED: CPT

## 2024-01-20 PROCEDURE — 3600000007 HC OR TIME - EACH INCREMENTAL 1 MINUTE - PROCEDURE LEVEL TWO: Performed by: STUDENT IN AN ORGANIZED HEALTH CARE EDUCATION/TRAINING PROGRAM

## 2024-01-20 PROCEDURE — 80202 ASSAY OF VANCOMYCIN: CPT

## 2024-01-20 PROCEDURE — 2720000007 HC OR 272 NO HCPCS: Performed by: STUDENT IN AN ORGANIZED HEALTH CARE EDUCATION/TRAINING PROGRAM

## 2024-01-20 PROCEDURE — A4217 STERILE WATER/SALINE, 500 ML: HCPCS | Performed by: STUDENT IN AN ORGANIZED HEALTH CARE EDUCATION/TRAINING PROGRAM

## 2024-01-20 PROCEDURE — 7100000001 HC RECOVERY ROOM TIME - INITIAL BASE CHARGE: Performed by: STUDENT IN AN ORGANIZED HEALTH CARE EDUCATION/TRAINING PROGRAM

## 2024-01-20 PROCEDURE — 36415 COLL VENOUS BLD VENIPUNCTURE: CPT

## 2024-01-20 PROCEDURE — 87040 BLOOD CULTURE FOR BACTERIA: CPT | Mod: ELYLAB

## 2024-01-20 PROCEDURE — 0SBD0ZZ EXCISION OF LEFT KNEE JOINT, OPEN APPROACH: ICD-10-PCS | Performed by: STUDENT IN AN ORGANIZED HEALTH CARE EDUCATION/TRAINING PROGRAM

## 2024-01-20 PROCEDURE — 85610 PROTHROMBIN TIME: CPT

## 2024-01-20 PROCEDURE — 2500000004 HC RX 250 GENERAL PHARMACY W/ HCPCS (ALT 636 FOR OP/ED)

## 2024-01-20 PROCEDURE — 87070 CULTURE OTHR SPECIMN AEROBIC: CPT | Mod: ELYLAB | Performed by: STUDENT IN AN ORGANIZED HEALTH CARE EDUCATION/TRAINING PROGRAM

## 2024-01-20 PROCEDURE — 2500000004 HC RX 250 GENERAL PHARMACY W/ HCPCS (ALT 636 FOR OP/ED): Performed by: STUDENT IN AN ORGANIZED HEALTH CARE EDUCATION/TRAINING PROGRAM

## 2024-01-20 PROCEDURE — 1100000001 HC PRIVATE ROOM DAILY

## 2024-01-20 PROCEDURE — 29875 ARTHRS KNEE SURG SYNVCT LMTD: CPT | Performed by: STUDENT IN AN ORGANIZED HEALTH CARE EDUCATION/TRAINING PROGRAM

## 2024-01-20 PROCEDURE — 2500000004 HC RX 250 GENERAL PHARMACY W/ HCPCS (ALT 636 FOR OP/ED): Performed by: ANESTHESIOLOGY

## 2024-01-20 PROCEDURE — 3700000002 HC GENERAL ANESTHESIA TIME - EACH INCREMENTAL 1 MINUTE: Performed by: STUDENT IN AN ORGANIZED HEALTH CARE EDUCATION/TRAINING PROGRAM

## 2024-01-20 PROCEDURE — 2500000002 HC RX 250 W HCPCS SELF ADMINISTERED DRUGS (ALT 637 FOR MEDICARE OP, ALT 636 FOR OP/ED): Performed by: NURSE PRACTITIONER

## 2024-01-20 PROCEDURE — 3700000001 HC GENERAL ANESTHESIA TIME - INITIAL BASE CHARGE: Performed by: STUDENT IN AN ORGANIZED HEALTH CARE EDUCATION/TRAINING PROGRAM

## 2024-01-20 PROCEDURE — 99222 1ST HOSP IP/OBS MODERATE 55: CPT | Performed by: NURSE PRACTITIONER

## 2024-01-20 PROCEDURE — 2500000004 HC RX 250 GENERAL PHARMACY W/ HCPCS (ALT 636 FOR OP/ED): Performed by: NURSE PRACTITIONER

## 2024-01-20 PROCEDURE — 7100000002 HC RECOVERY ROOM TIME - EACH INCREMENTAL 1 MINUTE: Performed by: STUDENT IN AN ORGANIZED HEALTH CARE EDUCATION/TRAINING PROGRAM

## 2024-01-20 PROCEDURE — 82374 ASSAY BLOOD CARBON DIOXIDE: CPT

## 2024-01-20 PROCEDURE — 2500000005 HC RX 250 GENERAL PHARMACY W/O HCPCS: Performed by: ANESTHESIOLOGY

## 2024-01-20 PROCEDURE — 2500000004 HC RX 250 GENERAL PHARMACY W/ HCPCS (ALT 636 FOR OP/ED): Performed by: INTERNAL MEDICINE

## 2024-01-20 PROCEDURE — 82947 ASSAY GLUCOSE BLOOD QUANT: CPT

## 2024-01-20 RX ORDER — FENTANYL CITRATE 50 UG/ML
INJECTION, SOLUTION INTRAMUSCULAR; INTRAVENOUS AS NEEDED
Status: DISCONTINUED | OUTPATIENT
Start: 2024-01-20 | End: 2024-01-20

## 2024-01-20 RX ORDER — LISINOPRIL 10 MG/1
10 TABLET ORAL DAILY
Status: DISCONTINUED | OUTPATIENT
Start: 2024-01-21 | End: 2024-01-31

## 2024-01-20 RX ORDER — ROSUVASTATIN CALCIUM 10 MG/1
10 TABLET, COATED ORAL NIGHTLY
Status: DISCONTINUED | OUTPATIENT
Start: 2024-01-20 | End: 2024-02-01 | Stop reason: HOSPADM

## 2024-01-20 RX ORDER — MORPHINE SULFATE 2 MG/ML
2 INJECTION, SOLUTION INTRAMUSCULAR; INTRAVENOUS EVERY 4 HOURS PRN
Status: DISCONTINUED | OUTPATIENT
Start: 2024-01-20 | End: 2024-02-01 | Stop reason: HOSPADM

## 2024-01-20 RX ORDER — MEPERIDINE HYDROCHLORIDE 25 MG/ML
12.5 INJECTION INTRAMUSCULAR; INTRAVENOUS; SUBCUTANEOUS EVERY 10 MIN PRN
Status: DISCONTINUED | OUTPATIENT
Start: 2024-01-20 | End: 2024-01-20 | Stop reason: HOSPADM

## 2024-01-20 RX ORDER — FAMOTIDINE 20 MG/1
20 TABLET, FILM COATED ORAL EVERY 12 HOURS
Status: DISCONTINUED | OUTPATIENT
Start: 2024-01-20 | End: 2024-02-01 | Stop reason: HOSPADM

## 2024-01-20 RX ORDER — SODIUM CHLORIDE, SODIUM LACTATE, POTASSIUM CHLORIDE, CALCIUM CHLORIDE 600; 310; 30; 20 MG/100ML; MG/100ML; MG/100ML; MG/100ML
100 INJECTION, SOLUTION INTRAVENOUS CONTINUOUS
Status: DISCONTINUED | OUTPATIENT
Start: 2024-01-20 | End: 2024-01-25

## 2024-01-20 RX ORDER — DEXAMETHASONE SODIUM PHOSPHATE 4 MG/ML
INJECTION, SOLUTION INTRA-ARTICULAR; INTRALESIONAL; INTRAMUSCULAR; INTRAVENOUS; SOFT TISSUE AS NEEDED
Status: DISCONTINUED | OUTPATIENT
Start: 2024-01-20 | End: 2024-01-20

## 2024-01-20 RX ORDER — ONDANSETRON HYDROCHLORIDE 2 MG/ML
4 INJECTION, SOLUTION INTRAVENOUS EVERY 8 HOURS PRN
Status: DISCONTINUED | OUTPATIENT
Start: 2024-01-20 | End: 2024-02-01 | Stop reason: HOSPADM

## 2024-01-20 RX ORDER — HYDROMORPHONE HYDROCHLORIDE 1 MG/ML
INJECTION, SOLUTION INTRAMUSCULAR; INTRAVENOUS; SUBCUTANEOUS AS NEEDED
Status: DISCONTINUED | OUTPATIENT
Start: 2024-01-20 | End: 2024-01-20

## 2024-01-20 RX ORDER — ONDANSETRON 4 MG/1
4 TABLET, ORALLY DISINTEGRATING ORAL EVERY 8 HOURS PRN
Status: DISCONTINUED | OUTPATIENT
Start: 2024-01-20 | End: 2024-02-01 | Stop reason: HOSPADM

## 2024-01-20 RX ORDER — OXYCODONE HYDROCHLORIDE 5 MG/1
10 TABLET ORAL EVERY 4 HOURS PRN
Status: DISCONTINUED | OUTPATIENT
Start: 2024-01-20 | End: 2024-02-01 | Stop reason: HOSPADM

## 2024-01-20 RX ORDER — NALOXONE HYDROCHLORIDE 1 MG/ML
0.2 INJECTION INTRAMUSCULAR; INTRAVENOUS; SUBCUTANEOUS EVERY 5 MIN PRN
Status: DISCONTINUED | OUTPATIENT
Start: 2024-01-20 | End: 2024-02-01 | Stop reason: HOSPADM

## 2024-01-20 RX ORDER — SODIUM CHLORIDE, SODIUM LACTATE, POTASSIUM CHLORIDE, CALCIUM CHLORIDE 600; 310; 30; 20 MG/100ML; MG/100ML; MG/100ML; MG/100ML
100 INJECTION, SOLUTION INTRAVENOUS CONTINUOUS
Status: DISCONTINUED | OUTPATIENT
Start: 2024-01-20 | End: 2024-01-20 | Stop reason: HOSPADM

## 2024-01-20 RX ORDER — OXYCODONE HYDROCHLORIDE 5 MG/1
5 TABLET ORAL EVERY 4 HOURS PRN
Status: DISCONTINUED | OUTPATIENT
Start: 2024-01-20 | End: 2024-02-01 | Stop reason: HOSPADM

## 2024-01-20 RX ORDER — FENTANYL CITRATE 50 UG/ML
25 INJECTION, SOLUTION INTRAMUSCULAR; INTRAVENOUS EVERY 5 MIN PRN
Status: DISCONTINUED | OUTPATIENT
Start: 2024-01-20 | End: 2024-01-20 | Stop reason: HOSPADM

## 2024-01-20 RX ORDER — HYDRALAZINE HYDROCHLORIDE 20 MG/ML
10 INJECTION INTRAMUSCULAR; INTRAVENOUS EVERY 8 HOURS PRN
Status: DISCONTINUED | OUTPATIENT
Start: 2024-01-20 | End: 2024-01-26

## 2024-01-20 RX ORDER — LIDOCAINE HYDROCHLORIDE 20 MG/ML
INJECTION, SOLUTION EPIDURAL; INFILTRATION; INTRACAUDAL; PERINEURAL AS NEEDED
Status: DISCONTINUED | OUTPATIENT
Start: 2024-01-20 | End: 2024-01-20

## 2024-01-20 RX ORDER — SODIUM CHLORIDE 0.9 G/100ML
IRRIGANT IRRIGATION AS NEEDED
Status: DISCONTINUED | OUTPATIENT
Start: 2024-01-20 | End: 2024-01-20 | Stop reason: HOSPADM

## 2024-01-20 RX ORDER — CEFTRIAXONE 1 G/50ML
1 INJECTION, SOLUTION INTRAVENOUS EVERY 24 HOURS
Status: DISCONTINUED | OUTPATIENT
Start: 2024-01-20 | End: 2024-01-24

## 2024-01-20 RX ORDER — INSULIN LISPRO 100 [IU]/ML
0-5 INJECTION, SOLUTION INTRAVENOUS; SUBCUTANEOUS
Status: DISCONTINUED | OUTPATIENT
Start: 2024-01-20 | End: 2024-01-20

## 2024-01-20 RX ORDER — CEFAZOLIN SODIUM 2 G/50ML
SOLUTION INTRAVENOUS AS NEEDED
Status: DISCONTINUED | OUTPATIENT
Start: 2024-01-20 | End: 2024-01-20

## 2024-01-20 RX ORDER — DEXTROSE 50 % IN WATER (D50W) INTRAVENOUS SYRINGE
25
Status: DISCONTINUED | OUTPATIENT
Start: 2024-01-20 | End: 2024-02-01 | Stop reason: HOSPADM

## 2024-01-20 RX ORDER — KETOROLAC TROMETHAMINE 30 MG/ML
INJECTION, SOLUTION INTRAMUSCULAR; INTRAVENOUS AS NEEDED
Status: DISCONTINUED | OUTPATIENT
Start: 2024-01-20 | End: 2024-01-20

## 2024-01-20 RX ORDER — MORPHINE SULFATE 4 MG/ML
4 INJECTION, SOLUTION INTRAMUSCULAR; INTRAVENOUS EVERY 4 HOURS PRN
Status: DISCONTINUED | OUTPATIENT
Start: 2024-01-20 | End: 2024-01-20

## 2024-01-20 RX ORDER — TRANEXAMIC ACID 650 MG/1
1950 TABLET ORAL ONCE
Status: COMPLETED | OUTPATIENT
Start: 2024-01-20 | End: 2024-01-20

## 2024-01-20 RX ORDER — TRANEXAMIC ACID 650 MG/1
1950 TABLET ORAL ONCE
Status: COMPLETED | OUTPATIENT
Start: 2024-01-21 | End: 2024-01-21

## 2024-01-20 RX ORDER — CYCLOBENZAPRINE HCL 10 MG
10 TABLET ORAL 3 TIMES DAILY PRN
Status: DISCONTINUED | OUTPATIENT
Start: 2024-01-20 | End: 2024-02-01 | Stop reason: HOSPADM

## 2024-01-20 RX ORDER — DIPHENHYDRAMINE HYDROCHLORIDE 50 MG/ML
INJECTION INTRAMUSCULAR; INTRAVENOUS AS NEEDED
Status: DISCONTINUED | OUTPATIENT
Start: 2024-01-20 | End: 2024-01-20

## 2024-01-20 RX ORDER — ENOXAPARIN SODIUM 100 MG/ML
40 INJECTION SUBCUTANEOUS DAILY
Status: DISCONTINUED | OUTPATIENT
Start: 2024-01-20 | End: 2024-02-01 | Stop reason: HOSPADM

## 2024-01-20 RX ORDER — DOCUSATE SODIUM 100 MG/1
100 CAPSULE, LIQUID FILLED ORAL 2 TIMES DAILY
Status: DISCONTINUED | OUTPATIENT
Start: 2024-01-20 | End: 2024-02-01 | Stop reason: HOSPADM

## 2024-01-20 RX ORDER — FENTANYL CITRATE 50 UG/ML
50 INJECTION, SOLUTION INTRAMUSCULAR; INTRAVENOUS EVERY 5 MIN PRN
Status: DISCONTINUED | OUTPATIENT
Start: 2024-01-20 | End: 2024-01-20 | Stop reason: HOSPADM

## 2024-01-20 RX ORDER — MORPHINE SULFATE 2 MG/ML
2 INJECTION, SOLUTION INTRAMUSCULAR; INTRAVENOUS EVERY 4 HOURS PRN
Status: DISCONTINUED | OUTPATIENT
Start: 2024-01-20 | End: 2024-01-20

## 2024-01-20 RX ORDER — INSULIN LISPRO 100 [IU]/ML
0-5 INJECTION, SOLUTION INTRAVENOUS; SUBCUTANEOUS
Status: DISCONTINUED | OUTPATIENT
Start: 2024-01-20 | End: 2024-02-01 | Stop reason: HOSPADM

## 2024-01-20 RX ORDER — ACETAMINOPHEN 325 MG/1
650 TABLET ORAL EVERY 4 HOURS PRN
Status: DISCONTINUED | OUTPATIENT
Start: 2024-01-20 | End: 2024-02-01 | Stop reason: HOSPADM

## 2024-01-20 RX ORDER — PROPOFOL 10 MG/ML
INJECTION, EMULSION INTRAVENOUS AS NEEDED
Status: DISCONTINUED | OUTPATIENT
Start: 2024-01-20 | End: 2024-01-20

## 2024-01-20 RX ORDER — DEXTROSE MONOHYDRATE 100 MG/ML
0.3 INJECTION, SOLUTION INTRAVENOUS ONCE AS NEEDED
Status: DISCONTINUED | OUTPATIENT
Start: 2024-01-20 | End: 2024-02-01 | Stop reason: HOSPADM

## 2024-01-20 RX ADMIN — SODIUM CHLORIDE 10 ML: 9 INJECTION, SOLUTION INTRAVENOUS at 16:40

## 2024-01-20 RX ADMIN — VANCOMYCIN HYDROCHLORIDE 1500 MG: 1.5 INJECTION, POWDER, LYOPHILIZED, FOR SOLUTION INTRAVENOUS at 12:58

## 2024-01-20 RX ADMIN — HYDROMORPHONE HYDROCHLORIDE 1 MG: 1 INJECTION, SOLUTION INTRAMUSCULAR; INTRAVENOUS; SUBCUTANEOUS at 11:21

## 2024-01-20 RX ADMIN — DEXAMETHASONE SODIUM PHOSPHATE 4 MG: 4 INJECTION, SOLUTION INTRAMUSCULAR; INTRAVENOUS at 10:47

## 2024-01-20 RX ADMIN — CEFTRIAXONE SODIUM 1 G: 1 INJECTION, SOLUTION INTRAVENOUS at 21:23

## 2024-01-20 RX ADMIN — ROSUVASTATIN CALCIUM 10 MG: 10 TABLET, FILM COATED ORAL at 21:19

## 2024-01-20 RX ADMIN — DIPHENHYDRAMINE HYDROCHLORIDE 12.5 MG: 50 INJECTION INTRAMUSCULAR; INTRAVENOUS at 10:47

## 2024-01-20 RX ADMIN — TAZOBACTAM SODIUM AND PIPERACILLIN SODIUM 3.38 G: 375; 3 INJECTION, SOLUTION INTRAVENOUS at 10:45

## 2024-01-20 RX ADMIN — KETOROLAC TROMETHAMINE 30 MG: 30 INJECTION, SOLUTION INTRAMUSCULAR at 11:21

## 2024-01-20 RX ADMIN — FENTANYL CITRATE 25 MCG: 50 INJECTION, SOLUTION INTRAMUSCULAR; INTRAVENOUS at 11:06

## 2024-01-20 RX ADMIN — SODIUM CHLORIDE, SODIUM LACTATE, POTASSIUM CHLORIDE, AND CALCIUM CHLORIDE: 600; 310; 30; 20 INJECTION, SOLUTION INTRAVENOUS at 10:34

## 2024-01-20 RX ADMIN — INSULIN LISPRO 2 UNITS: 100 INJECTION, SOLUTION INTRAVENOUS; SUBCUTANEOUS at 16:43

## 2024-01-20 RX ADMIN — ONDANSETRON 4 MG: 2 INJECTION, SOLUTION INTRAMUSCULAR; INTRAVENOUS at 11:21

## 2024-01-20 RX ADMIN — PROPOFOL 200 MG: 10 INJECTION, EMULSION INTRAVENOUS at 10:39

## 2024-01-20 RX ADMIN — PIPERACILLIN SODIUM AND TAZOBACTAM SODIUM 3.38 G: 3; .375 INJECTION, SOLUTION INTRAVENOUS at 16:40

## 2024-01-20 RX ADMIN — TRANEXAMIC ACID 1950 MG: 650 TABLET ORAL at 16:41

## 2024-01-20 RX ADMIN — CEFAZOLIN SODIUM 2 G: 2 SOLUTION INTRAVENOUS at 10:56

## 2024-01-20 RX ADMIN — LIDOCAINE HYDROCHLORIDE 80 MG: 20 INJECTION, SOLUTION EPIDURAL; INFILTRATION; INTRACAUDAL; PERINEURAL at 10:39

## 2024-01-20 RX ADMIN — MIDAZOLAM 2 MG: 1 INJECTION INTRAMUSCULAR; INTRAVENOUS at 10:37

## 2024-01-20 RX ADMIN — FENTANYL CITRATE 25 MCG: 50 INJECTION, SOLUTION INTRAMUSCULAR; INTRAVENOUS at 11:13

## 2024-01-20 RX ADMIN — INSULIN LISPRO 3 UNITS: 100 INJECTION, SOLUTION INTRAVENOUS; SUBCUTANEOUS at 21:11

## 2024-01-20 RX ADMIN — SODIUM CHLORIDE, SODIUM LACTATE, POTASSIUM CHLORIDE, AND CALCIUM CHLORIDE 100 ML/HR: 600; 310; 30; 20 INJECTION, SOLUTION INTRAVENOUS at 14:35

## 2024-01-20 SDOH — SOCIAL STABILITY: SOCIAL INSECURITY: HAS ANYONE EVER THREATENED TO HURT YOUR FAMILY OR YOUR PETS?: NO

## 2024-01-20 SDOH — SOCIAL STABILITY: SOCIAL INSECURITY: DO YOU FEEL UNSAFE GOING BACK TO THE PLACE WHERE YOU ARE LIVING?: NO

## 2024-01-20 SDOH — SOCIAL STABILITY: SOCIAL INSECURITY: DOES ANYONE TRY TO KEEP YOU FROM HAVING/CONTACTING OTHER FRIENDS OR DOING THINGS OUTSIDE YOUR HOME?: NO

## 2024-01-20 SDOH — SOCIAL STABILITY: SOCIAL INSECURITY: WERE YOU ABLE TO COMPLETE ALL THE BEHAVIORAL HEALTH SCREENINGS?: YES

## 2024-01-20 SDOH — SOCIAL STABILITY: SOCIAL INSECURITY: ARE YOU OR HAVE YOU BEEN THREATENED OR ABUSED PHYSICALLY, EMOTIONALLY, OR SEXUALLY BY ANYONE?: NO

## 2024-01-20 SDOH — SOCIAL STABILITY: SOCIAL INSECURITY: ARE THERE ANY APPARENT SIGNS OF INJURIES/BEHAVIORS THAT COULD BE RELATED TO ABUSE/NEGLECT?: NO

## 2024-01-20 SDOH — SOCIAL STABILITY: SOCIAL INSECURITY: HAVE YOU HAD THOUGHTS OF HARMING ANYONE ELSE?: NO

## 2024-01-20 SDOH — SOCIAL STABILITY: SOCIAL INSECURITY: ABUSE: ADULT

## 2024-01-20 SDOH — SOCIAL STABILITY: SOCIAL INSECURITY: DO YOU FEEL ANYONE HAS EXPLOITED OR TAKEN ADVANTAGE OF YOU FINANCIALLY OR OF YOUR PERSONAL PROPERTY?: NO

## 2024-01-20 ASSESSMENT — PAIN - FUNCTIONAL ASSESSMENT
PAIN_FUNCTIONAL_ASSESSMENT: UNABLE TO SELF-REPORT
PAIN_FUNCTIONAL_ASSESSMENT: 0-10
PAIN_FUNCTIONAL_ASSESSMENT: 0-10

## 2024-01-20 ASSESSMENT — LIFESTYLE VARIABLES
AUDIT-C TOTAL SCORE: 0
PRESCIPTION_ABUSE_PAST_12_MONTHS: NO
SKIP TO QUESTIONS 9-10: 1
AUDIT-C TOTAL SCORE: 0
HOW OFTEN DO YOU HAVE A DRINK CONTAINING ALCOHOL: NEVER
SUBSTANCE_ABUSE_PAST_12_MONTHS: NO
HOW OFTEN DO YOU HAVE 6 OR MORE DRINKS ON ONE OCCASION: NEVER
HOW MANY STANDARD DRINKS CONTAINING ALCOHOL DO YOU HAVE ON A TYPICAL DAY: PATIENT DOES NOT DRINK

## 2024-01-20 ASSESSMENT — ACTIVITIES OF DAILY LIVING (ADL)
TOILETING: INDEPENDENT
HEARING - RIGHT EAR: FUNCTIONAL
ASSISTIVE_DEVICE: EYEGLASSES
ADEQUATE_TO_COMPLETE_ADL: YES
JUDGMENT_ADEQUATE_SAFELY_COMPLETE_DAILY_ACTIVITIES: YES
BATHING: INDEPENDENT
DRESSING YOURSELF: INDEPENDENT
HEARING - LEFT EAR: FUNCTIONAL
FEEDING YOURSELF: INDEPENDENT
GROOMING: INDEPENDENT
WALKS IN HOME: INDEPENDENT
LACK_OF_TRANSPORTATION: NO
PATIENT'S MEMORY ADEQUATE TO SAFELY COMPLETE DAILY ACTIVITIES?: YES

## 2024-01-20 ASSESSMENT — COGNITIVE AND FUNCTIONAL STATUS - GENERAL
DAILY ACTIVITIY SCORE: 24
MOVING TO AND FROM BED TO CHAIR: A LITTLE
PATIENT BASELINE BEDBOUND: NO
TURNING FROM BACK TO SIDE WHILE IN FLAT BAD: A LITTLE
CLIMB 3 TO 5 STEPS WITH RAILING: A LITTLE
STANDING UP FROM CHAIR USING ARMS: A LITTLE
MOBILITY SCORE: 18
MOVING FROM LYING ON BACK TO SITTING ON SIDE OF FLAT BED WITH BEDRAILS: A LITTLE
WALKING IN HOSPITAL ROOM: A LITTLE

## 2024-01-20 ASSESSMENT — PATIENT HEALTH QUESTIONNAIRE - PHQ9
1. LITTLE INTEREST OR PLEASURE IN DOING THINGS: NOT AT ALL
SUM OF ALL RESPONSES TO PHQ9 QUESTIONS 1 & 2: 0
2. FEELING DOWN, DEPRESSED OR HOPELESS: NOT AT ALL

## 2024-01-20 ASSESSMENT — ENCOUNTER SYMPTOMS
ARTHRALGIAS: 1
HEMATOLOGIC/LYMPHATIC NEGATIVE: 1
MYALGIAS: 1
WOUND: 1
CONSTITUTIONAL NEGATIVE: 1
ENDOCRINE NEGATIVE: 1
RESPIRATORY NEGATIVE: 1
CARDIOVASCULAR NEGATIVE: 1
GASTROINTESTINAL NEGATIVE: 1
JOINT SWELLING: 1
EYES NEGATIVE: 1

## 2024-01-20 ASSESSMENT — COLUMBIA-SUICIDE SEVERITY RATING SCALE - C-SSRS
1. IN THE PAST MONTH, HAVE YOU WISHED YOU WERE DEAD OR WISHED YOU COULD GO TO SLEEP AND NOT WAKE UP?: NO
2. HAVE YOU ACTUALLY HAD ANY THOUGHTS OF KILLING YOURSELF?: NO
6. HAVE YOU EVER DONE ANYTHING, STARTED TO DO ANYTHING, OR PREPARED TO DO ANYTHING TO END YOUR LIFE?: NO

## 2024-01-20 ASSESSMENT — PAIN SCALES - GENERAL
PAIN_LEVEL: 2
PAINLEVEL_OUTOF10: 3
PAINLEVEL_OUTOF10: 5 - MODERATE PAIN

## 2024-01-20 NOTE — ANESTHESIA PROCEDURE NOTES
Airway  Date/Time: 1/20/2024 10:40 AM  Urgency: elective      Staffing  Performed: attending   Authorized by: Dylan yBers MD    Performed by: Dylan Byers MD  Patient location during procedure: OR    Indications and Patient Condition  Indications for airway management: anesthesia  Spontaneous ventilation: present  Sedation level: minimal  Preoxygenated: yes  Patient position: sniffing  MILS not maintained throughout  Mask difficulty assessment: 0 - not attempted  Planned trial extubation    Final Airway Details  Final airway type: supraglottic airway      Successful airway: Supraglottic airway: ambu.  Size 4     Number of attempts at approach: 1  Ventilation between attempts: none  Number of other approaches attempted: 0

## 2024-01-20 NOTE — H&P
History Of Present Illness  Melchor Bergeron is a 63 y.o. male with past medical history of osteoarthritis, hypertension, hyperlipidemia, type 2 diabetes, GERD was seen in the office for follow-up of the left knee.  He had a cortisone injection on 1/15/2024 and he states 48 hours after the injection he developed swelling in his knee.  He states he did not have swelling prior and states the pain is sharp.  His left knee was aspirated and the clinic and there was high suspicion for septic arthropathy.  He was direct admitted to the hospital for plan for irrigation and debridement of the left knee today.     Past Medical History  He has a past medical history of Tendinitis (12/14/2023).    Surgical History  He has a past surgical history that includes Other surgical history (12/10/2020) and Other surgical history (12/10/2020).     Social History  He reports that he has never smoked. He has never been exposed to tobacco smoke. He has never used smokeless tobacco. He reports that he does not drink alcohol and does not use drugs.    Family History  Family History   Problem Relation Name Age of Onset    Cancer Mother      Heart disease Father      Dementia Father      Diabetes Sister x1     No Known Problems Brother x2         Allergies  Sulfa (sulfonamide antibiotics) and Metformin    Review of Systems     Physical Exam  HENT:      Mouth/Throat:      Mouth: Mucous membranes are moist.   Eyes:      Pupils: Pupils are equal, round, and reactive to light.   Cardiovascular:      Rate and Rhythm: Normal rate and regular rhythm.      Pulses: Normal pulses.      Heart sounds: Normal heart sounds.   Pulmonary:      Effort: Pulmonary effort is normal.      Breath sounds: Normal breath sounds.   Abdominal:      General: Abdomen is flat. Bowel sounds are normal.      Palpations: Abdomen is soft.   Musculoskeletal:      Left knee: Swelling present. Tenderness present.   Skin:     General: Skin is warm and dry.      Capillary Refill:  Capillary refill takes less than 2 seconds.   Neurological:      Mental Status: He is alert and oriented to person, place, and time.   Psychiatric:         Mood and Affect: Mood normal.          Last Recorded Vitals  /87 (BP Location: Left arm, Patient Position: Lying)   Pulse 109   Temp 37.1 °C (98.8 °F) (Temporal)   Resp 16   Wt 75.1 kg (165 lb 9.1 oz)   SpO2 97%     Relevant Results  CBC and BMP pending, will review when resulted    Imagining  XR knee 4+ views bilateral  Narrative: Interpreted By:  German Hughes III,   STUDY:  XR KNEE 4+ VIEWS BILATERAL; ;  1/15/2024 2:58 pm      INDICATION:  Signs/Symptoms:pain.      COMPARISON:  None.      ACCESSION NUMBER(S):  [id number]      ORDERING CLINICIAN:  GERMAN HUGHES      FINDINGS:  Bilateral weight-bearing views knees: Severe osteophytosis sclerosis  joint space narrowing the medial compartment as well as about the  patellofemoral space. There is osteophytosis and sclerosis as well as  subchondral cystic change consistent with primary arthritis. No acute  osseous abnormality no fracture or dislocation appreciated      Impression: Severe bilateral knee arthritis          MACRO:  None      Signed by: German Hughes III 1/15/2024 5:09 PM  Dictation workstation:   [id number]       Assessment/Plan   Principal Problem:    Arthritis due to other bacteria, left knee (CMS/HCC)  Active Problems:    Arthritis, septic (CMS/HCC)      PLAN:     Left knee swelling and erythema  Suspicion for septic arthritis.  Aspiration from yesterday shows a white blood cell count of 129,000, no crystals. Plan is for him to go to the OR today for irrigation and debridement of his left knee. Risks versus benefits were discussed and patient verbalizes understanding.  - pain control  - plan for OR today for I&D left knee   - will work with therapy post op  - will give recommendations for weight bearing post op        SOLEDAD Franklin-CNP    Plan of care was discussed  extensively with patient.  Patient verbalized understanding through teach back method.  All question and concerns addressed upon examination.    Of note, this documentation is completed using the Dragon Dictation system (voice recognition software). There may be spelling and/or grammatical errors that were not corrected prior to final submission.

## 2024-01-20 NOTE — ANESTHESIA PREPROCEDURE EVALUATION
Melchor Bergeron is a 63 y.o. male here for:    [unfilled]  Incision and Drainage Lower Extremity  With Florentin Marti MD  * No Diagnosis Codes entered *    Lab Results   Component Value Date    HGB 15.6 01/20/2024    HCT 46.3 01/20/2024    WBC 11.2 01/20/2024     01/20/2024     (L) 01/20/2024    K 4.1 01/20/2024     01/20/2024    CREATININE 1.39 (H) 01/20/2024    BUN 31 (H) 01/20/2024       Social History     Substance and Sexual Activity   Drug Use Never      Tobacco Use: Low Risk  (1/19/2024)    Patient History    • Smoking Tobacco Use: Never    • Smokeless Tobacco Use: Never    • Passive Exposure: Never      Social History     Substance and Sexual Activity   Alcohol Use Never        Allergies   Allergen Reactions   • Sulfa (Sulfonamide Antibiotics) Hives and Rash   • Metformin Diarrhea       Current Outpatient Medications   Medication Instructions   • aspirin 81 mg, oral, Daily   • ertugliflozin (STEGLATRO) 15 mg, oral, Daily   • famotidine (PEPCID) 20 mg, oral, Every 12 hours   • glimepiride (AMARYL) 4 mg, oral, Daily before breakfast   • lisinopril 10 mg, oral, Daily   • metFORMIN (GLUCOPHAGE) 1,000 mg, oral, Daily with breakfast   • multivitamin with minerals iron-free (Vision Plus Lutein) oral   • rosuvastatin (CRESTOR) 10 mg, oral, Daily   • semaglutide (RYBELSUS) 3 mg, oral, Daily   • traMADol (ULTRAM) 50 mg, oral, Every 8 hours PRN       Past Medical History:   Diagnosis Date   • Tendinitis 12/14/2023       Past Surgical History:   Procedure Laterality Date   • OTHER SURGICAL HISTORY  12/10/2020    Cataract surgery   • OTHER SURGICAL HISTORY  12/10/2020    Tonsillectomy       Family History   Problem Relation Name Age of Onset   • Cancer Mother     • Heart disease Father     • Dementia Father     • Diabetes Sister x1    • No Known Problems Brother x2        Relevant Problems   Cardiovascular   (+) Hyperlipidemia   (+) Hypertension      Endocrine   (+) Type 2 diabetes mellitus with  "microalbuminuria, without long-term current use of insulin (CMS/HCC)      GI   (+) Gastroesophageal reflux disease      Musculoskeletal   (+) Primary osteoarthritis of left knee      Infectious Disease   (+) Arthritis due to other bacteria, left knee (CMS/HCC)   (+) Arthritis, septic (CMS/HCC)      Other   (+) Arthritis due to other bacteria, left knee (CMS/HCC)   (+) Arthritis, septic (CMS/HCC)       Visit Vitals  /87 (BP Location: Left arm, Patient Position: Lying)   Pulse 109   Temp 37.1 °C (98.8 °F) (Temporal)   Resp 16   Ht 1.753 m (5' 9\")   Wt 75.1 kg (165 lb 9.1 oz)   SpO2 97%   BMI 24.45 kg/m²   Smoking Status Never   BSA 1.91 m²       NPO Details:  No data recorded    Physical Exam    Airway  Mallampati: II  TM distance: >3 FB  Neck ROM: full     Cardiovascular - normal exam     Dental - normal exam     Pulmonary - normal exam     Abdominal - normal exam  Abdomen: soft           Anesthesia Plan    History of general anesthesia?: yes  History of complications of general anesthesia?: no    ASA 2     general     intravenous induction   Anesthetic plan and risks discussed with patient.  "

## 2024-01-20 NOTE — Clinical Note
No fluid was removed by Dr. MAXWELL during attempted aspiration to left upper chest. Pt tolerated procedure well. Bandaid to site. Pt transported back to room, report given to Chandana FRANK.

## 2024-01-20 NOTE — OP NOTE
Incision and Drainage Lower Extremity (L) Operative Note    Date: 2024  OR Location: [facility]    Name: Melchor Bergeron, : 1960, Age: 63 y.o., MRN: [id number], Sex: male    Diagnosis  * No Diagnosis Codes entered * Post-op Diagnosis     * Pyogenic arthritis of left knee joint, due to unspecified organism (CMS/[facility]) [M00.9]     Procedures  Incision and Drainage Lower Extremity  45271 - NM INCISION & DRAINAGE ABSCESS SIMPLE/SINGLE    NM ARTHROSCOPY KNEE SYNOVECTOMY LIMITED SPX [56800]  Surgeons      * Florentin Marti - Primary    Resident/Fellow/Other Assistant:  Surgeon(s) and Role:    Procedure Summary  Anesthesia: * No anesthesia type entered *  ASA: II  Anesthesia Staff: Anesthesiologist: Dylan Byers MD  Estimated Blood Loss: 20mL  Intra-op Medications:   Medication Name Total Dose   lactated Ringer's infusion 21.67 mL              Anesthesia Record               Intraprocedure I/O Totals          Intake    ceFAZolin 2 gram/50 mL 50.00 mL    Total Intake 50 mL          Specimen: No specimens collected     Staff:   Circulator: Giana Kang RN  Scrub Person: Victorina Walton         Drains and/or Catheters:   Closed/Suction Drain Left Knee Bulb 10 Fr. (Active)       Tourniquet Times:   * Missing tourniquet times found for documented tourniquets in lo *     Implants:     Findings: see procedure details    Indications: The patient is a 63-year-old male well-known to me with history of tricompartmental knee arthritis.  He is status post cortisone injection 4 days prior developed severe pain about the knee presented to my clinic yesterday afternoon and aspiration of the knee was consistent with purulent material.  This was sent to the lab for analysis white count was greater than 100,000.  Due to constellation of findings and diagnosis of septic arthritis arthroscopic irrigation and debridement was recommended.  Risk benefits alternative treatment discussed.  Will proceed with emergent irrigation  debridement arthroscopically left knee      For detailed discussion of risks, benefits, and alternatives, please see the orthopedic clinic notes.    We reviewed today the usual risks of arthroscopy, including bleeding, damage to neurovascular structures, postoperative stiffness, persistent pain, degenerative joint changes which may be progressive and require further treatment, wound healing complications, infection and development of a new or exacerbation of an existing medical comorbidity. We reviewed specifically the signs and symptoms of venous thromboembolic disease.     DESCRIPTION OF PROCEDURE:       On the date of surgery, the patient was identified in the preoperative holding area.  Surgical site was agreed upon, confirmed, and marked by the surgery team, nursing staff and the patient themself.  I marked the operative side. They were taken to the operating room and a surgical time-out was performed. They were positioned supine on the operating table with attention paid to padding all bony prominences. An anesthetic was administered by anesthesia staff. The limb was prepped and draped in the usual sterile fashion after a tourniquet was applied over soft padding. Attention was first turned to the diagnostic portion of the procedure.  Tourniquet elevated 250 mm    Repeat aspiration of the knee was performed with 30 cc of purulent material was obtained.  This was then sent to lab for analysis.  2 g Ancef were then initiated.    Diagnostic arthroscopy was then undertaken. The tourniquet was inflated and portal sites were marked utilizing anatomic landmarks.  A lateral viewing portal was established and then a medial working portal was established under direct visualization.  A probe was introduced and all structures were thoroughly probed and evaluated for pathology. Results of the diagnostic arthroscopy are as follows:      Suprapatellar pouch synovitis  Patella grade 4 Outerbridge change  Trochlea grade 4  Outerbridge change  Medial femoral condyle grade 4 Outerbridge change  Medial tibial plateau grade 4 Outerbridge change  Lateral femoral condyle normal  Lateral tibial plateau normal  Medial meniscus diminutive  Lateral meniscus normal   Medial gutter normal  Lateral gutter normal  Notch synovitis  ACL normal   PCL normal  Posterior knee no loose bodies    Attention was then turned to the therapeutic portion of the arthroscopic procedure.    Using a arthroscopic shaver a synovectomy was performed utilizing 9 L of normal saline solution.  Extensive irrigation debridement was performed about the patella compartment as well as medial lateral compartments.  Any dead necrotic material was sharply excised with arthroscopic shaver.  The knee was continuously copiously irrigated.  After thorough irrigation and debridement about the compartments of the knee a 10 Pashto drain was introduced into the joint space.  This was then sutured in place.       The knee was copiously lavaged.  The arthroscope was removed.  The portals were closed with 3-0 nylon sutures.   The tourniquet was deflated after application of an Ace wrap for compression.  The patient was awakened from anesthesia and taken to recovery room in good condition.      POSTOPERATIVE PLAN:   Will initiate empiric antibiotic treatment with Vanco and Zosyn  Will consult infectious disease for antibiotic management and treatment  Medical consult pending  Drain in place will continue to monitor output  Okay for range of motion and weightbearing after drain is removed        Complications:  None; patient tolerated the procedure well.    Disposition: PACU - hemodynamically stable.  Condition: stable         Florentin Marti  Phone Number: [phone]

## 2024-01-20 NOTE — PROGRESS NOTES
"Vancomycin Dosing by Pharmacy- INITIAL    Melchor Bergeron is a 63 y.o. year old male who Pharmacy has been consulted for vancomycin dosing for osteomyelitis/septic arthritis. Based on the patient's indication and renal status this patient will be dosed based on a goal AUC of 500-600.     Renal function is currently stable.    Visit Vitals  /77 (BP Location: Left arm, Patient Position: Lying)   Pulse 87   Temp 37 °C (98.6 °F)   Resp 10        Lab Results   Component Value Date    CREATININE 1.39 (H) 01/20/2024    CREATININE 1.28 12/15/2023    CREATININE 1.64 (H) 12/04/2020        Patient weight is No results found for: \"PTWEIGHT\"    No results found for: \"CULTURE\"     No intake/output data recorded.  [unfilled]    No results found for: \"PATIENTTEMP\"       Assessment/Plan     Patient will not be given a loading dose.  Will initiate vancomycin maintenance,  1500 mg every 24 hours.    This dosing regimen is predicted by [employer] to result in the following pharmacokinetic parameters:  Regimen: 1500 mg IV every 24 hours.  Start time: 00:58 on 01/21/2024  Exposure target: AUC24 (range)400-600 mg/L.hr   AUC24,ss: 572 mg/L.hr  Probability of AUC24 > 400: 86 %  Ctrough,ss: 16.9 mg/L  Probability of Ctrough,ss > 20: 34 %  Probability of nephrotoxicity (Lodise CARMEL 2009): 13 %      Follow-up level will be ordered on 1/20 at 1900 and 1/21 at AM labs unless clinically indicated sooner.  Will continue to monitor renal function daily while on vancomycin and order serum creatinine at least every 48 hours if not already ordered.  Follow for continued vancomycin needs, clinical response, and signs/symptoms of toxicity.       Yohana Akhtar, PharmD       "

## 2024-01-20 NOTE — CONSULTS
Consults    Hospitalist consulted for postoperative management of : Essential HTN and DM2    History Of Present Illness  This is a 63-year-old male admitted to [facility] under the primary services of orthopedic surgery due to left knee pain.  Patient failed outpatient management of left knee pain and swelling.  Patient diagnosed with septic arthroplasty.  Patient admitted under orthopedic services for irrigation and debridement of the left knee.  Hospital medicine has been consulted for postoperative management of essential hypertension and diabetes mellitus type 2    Review of systems: 10 system were reviewed and were negative except what was mentioned in history of present illness    Past Medical History  Past Medical History:   Diagnosis Date    Tendinitis 12/14/2023   Essential hypertension  Diabetes mellitus type 2  OA  Mixed hyperlipidemia  GERD      Surgical History  Past Surgical History:   Procedure Laterality Date    OTHER SURGICAL HISTORY  12/10/2020    Cataract surgery    OTHER SURGICAL HISTORY  12/10/2020    Tonsillectomy         Social History  Social History     Socioeconomic History    Marital status:      Spouse name: Not on file    Number of children: Not on file    Years of education: Not on file    Highest education level: Not on file   Occupational History    Not on file   Tobacco Use    Smoking status: Never     Passive exposure: Never    Smokeless tobacco: Never   Substance and Sexual Activity    Alcohol use: Never    Drug use: Never    Sexual activity: Not on file   Other Topics Concern    Not on file   Social History Narrative    Not on file     Social Determinants of Health     Financial Resource Strain: Low Risk  (1/20/2024)    Overall Financial Resource Strain (CARDIA)     Difficulty of Paying Living Expenses: Not hard at all   Food Insecurity: Not on file   Transportation Needs: No Transportation Needs (1/20/2024)    PRAPARE - Transportation     Lack of  Transportation (Medical): No     Lack of Transportation (Non-Medical): No   Physical Activity: Not on file   Stress: Not on file   Social Connections: Not on file   Intimate Partner Violence: Not on file   Housing Stability: Low Risk  (1/20/2024)    Housing Stability Vital Sign     Unable to Pay for Housing in the Last Year: No     Number of Places Lived in the Last Year: 1     Unstable Housing in the Last Year: No        Family History  Reviewed and not pertinent to patient presentation     Allergies  Allergies   Allergen Reactions    Sulfa (Sulfonamide Antibiotics) Hives and Rash    Metformin Diarrhea        Physical Exam  Physical Exam  Vitals reviewed.   Constitutional:       Appearance: Normal appearance. He is normal weight.   HENT:      Head: Normocephalic and atraumatic.      Mouth/Throat:      Mouth: Mucous membranes are moist.   Eyes:      Extraocular Movements: Extraocular movements intact.      Pupils: Pupils are equal, round, and reactive to light.   Cardiovascular:      Rate and Rhythm: Normal rate and regular rhythm.      Pulses: Normal pulses.      Heart sounds: Normal heart sounds. No murmur heard.     No gallop.   Pulmonary:      Effort: Pulmonary effort is normal. No respiratory distress.      Breath sounds: Normal breath sounds. No wheezing, rhonchi or rales.   Abdominal:      General: Abdomen is flat. Bowel sounds are normal. There is no distension.      Palpations: Abdomen is soft. There is no mass.      Tenderness: There is no abdominal tenderness. There is no rebound.      Hernia: No hernia is present.   Musculoskeletal:         General: Swelling present. No tenderness or signs of injury.      Cervical back: Normal range of motion and neck supple.      Right lower leg: No edema.      Left lower leg: No edema.      Comments: Left lower extremity range of motion limited due to knee pain   Skin:     General: Skin is warm and dry.      Capillary Refill: Capillary refill takes less than 2 seconds.       Findings: No bruising, erythema or rash.   Neurological:      General: No focal deficit present.      Mental Status: He is alert and oriented to person, place, and time.      Cranial Nerves: No cranial nerve deficit.      Sensory: No sensory deficit.      Motor: No weakness.   Psychiatric:         Mood and Affect: Mood normal.         Behavior: Behavior normal.        Last Recorded Vitals  Visit Vitals  /87 (BP Location: Left arm, Patient Position: Lying)   Pulse 109   Temp 37.1 °C (98.8 °F) (Temporal)   Resp 16        Scheduled medications     Continuous medications  lactated Ringer's, 100 mL/hr      PRN medications  PRN medications: acetaminophen, morphine, morphine, naloxone, ondansetron ODT **OR** ondansetron     Relevant Results  Results for orders placed or performed during the hospital encounter of 01/20/24 (from the past 96 hour(s))   CBC   Result Value Ref Range    WBC 11.2 4.4 - 11.3 x10*3/uL    nRBC 0.0 0.0 - 0.0 /100 WBCs    RBC 5.52 4.50 - 5.90 x10*6/uL    Hemoglobin 15.6 13.5 - 17.5 g/dL    Hematocrit 46.3 41.0 - 52.0 %    MCV 84 80 - 100 fL    MCH 28.3 26.0 - 34.0 pg    MCHC 33.7 32.0 - 36.0 g/dL    RDW 14.2 11.5 - 14.5 %    Platelets 171 150 - 450 x10*3/uL   Basic metabolic panel   Result Value Ref Range    Glucose 144 (H) 74 - 99 mg/dL    Sodium 135 (L) 136 - 145 mmol/L    Potassium 4.1 3.5 - 5.3 mmol/L    Chloride 103 98 - 107 mmol/L    Bicarbonate 16 (L) 21 - 32 mmol/L    Anion Gap 20 10 - 20 mmol/L    Urea Nitrogen 31 (H) 6 - 23 mg/dL    Creatinine 1.39 (H) 0.50 - 1.30 mg/dL    eGFR 57 (L) >60 mL/min/1.73m*2    Calcium 9.5 8.6 - 10.3 mg/dL   Protime-INR   Result Value Ref Range    Protime 13.0 (H) 9.8 - 12.8 seconds    INR 1.2 (H) 0.9 - 1.1        XR knee 4+ views bilateral    Result Date: 1/15/2024  Interpreted By:  Florentin Marti III, STUDY: XR KNEE 4+ VIEWS BILATERAL; ;  1/15/2024 2:58 pm   INDICATION: Signs/Symptoms:pain.   COMPARISON: None.   ACCESSION NUMBER(S): [id number]    ORDERING CLINICIAN: GERMAN HUGHES   FINDINGS: Bilateral weight-bearing views knees: Severe osteophytosis sclerosis joint space narrowing the medial compartment as well as about the patellofemoral space. There is osteophytosis and sclerosis as well as subchondral cystic change consistent with primary arthritis. No acute osseous abnormality no fracture or dislocation appreciated       Severe bilateral knee arthritis     MACRO: None   Signed by: German Hughes III 1/15/2024 5:09 PM Dictation workstation:   [id number]        Assessment and Plan    Principal Problem:    Arthritis due to other bacteria, left knee (CMS/HCC)  Active Problems:    Arthritis, septic (CMS/HCC)     Essential hypertension  Diabetes mellitus type 2  OA  Mixed hyperlipidemia  GERD    PLAN  Admit to orthopedic surgery  Labs and radiological studies reviewed  CBC and BMP daily per primary team  Monitor and replace electrolytes per protocol  Resume patient home medications as appropriate  Hold oral antidiabetic medications and resume at discharge.  Patient will need to supply any special 2 medications of his own since they likely are nonformulary.  Accu-Cheks before meals and at bedtime with SSI  DVT prophylaxis per primary team  Pain management per primary team  Diet and activity per primary team  PT and OT evaluation when able  Discharge planning per primary team  Hospital medicine will follow with you postoperatively    A total of 68 minutes was spent on this visit     Plan of care was discussed extensively with patient. Patient verbalized understanding through teach back method. All questions and concerns addressed upon examination.     Of note, this documentation is completed using the Dragon Dictation system (voice recognition software). There may be spelling and/or grammatical errors that were not corrected prior to final submission

## 2024-01-20 NOTE — ANESTHESIA POSTPROCEDURE EVALUATION
Patient: Melchor Bergeron    Procedure Summary       Date: 01/20/24 Room / Location: [facility] / [facility]    Anesthesia Start: 1034 Anesthesia Stop: 1135    Procedure: Incision and Drainage Lower Extremity (Left: Knee) Diagnosis: Pyogenic arthritis of left knee joint, due to unspecified organism (CMS/[facility])    Surgeons: Florentin Marti MD Responsible Provider: Dylan Byers MD    Anesthesia Type: general ASA Status: 2            Anesthesia Type: general    Vitals Value Taken Time   /77 01/20/24 1335   Temp 37 °C (98.6 °F) 01/20/24 1335   Pulse 87 01/20/24 1335   Resp 10 01/20/24 1256   SpO2 94 % 01/20/24 1335       Anesthesia Post Evaluation    Patient participation: complete - patient participated  Level of consciousness: awake  Pain score: 2  Pain management: adequate  Multimodal analgesia pain management approach  Airway patency: patent  Cardiovascular status: acceptable and hemodynamically stable  Respiratory status: acceptable, nonlabored ventilation and nasal cannula  Hydration status: acceptable  Postoperative Nausea and Vomiting: none        No notable events documented.

## 2024-01-21 LAB
ANION GAP SERPL CALC-SCNC: 13 MMOL/L (ref 10–20)
BUN SERPL-MCNC: 32 MG/DL (ref 6–23)
CALCIUM SERPL-MCNC: 8.7 MG/DL (ref 8.6–10.3)
CHLORIDE SERPL-SCNC: 104 MMOL/L (ref 98–107)
CO2 SERPL-SCNC: 23 MMOL/L (ref 21–32)
CREAT SERPL-MCNC: 1.21 MG/DL (ref 0.5–1.3)
EGFRCR SERPLBLD CKD-EPI 2021: 68 ML/MIN/1.73M*2
ERYTHROCYTE [DISTWIDTH] IN BLOOD BY AUTOMATED COUNT: 14.3 % (ref 11.5–14.5)
GLUCOSE BLD MANUAL STRIP-MCNC: 106 MG/DL (ref 74–99)
GLUCOSE BLD MANUAL STRIP-MCNC: 113 MG/DL (ref 74–99)
GLUCOSE BLD MANUAL STRIP-MCNC: 50 MG/DL (ref 74–99)
GLUCOSE BLD MANUAL STRIP-MCNC: 52 MG/DL (ref 74–99)
GLUCOSE BLD MANUAL STRIP-MCNC: 62 MG/DL (ref 74–99)
GLUCOSE BLD MANUAL STRIP-MCNC: 70 MG/DL (ref 74–99)
GLUCOSE BLD MANUAL STRIP-MCNC: 70 MG/DL (ref 74–99)
GLUCOSE BLD MANUAL STRIP-MCNC: 85 MG/DL (ref 74–99)
GLUCOSE BLD MANUAL STRIP-MCNC: 94 MG/DL (ref 74–99)
GLUCOSE SERPL-MCNC: 91 MG/DL (ref 74–99)
HCT VFR BLD AUTO: 42.2 % (ref 41–52)
HGB BLD-MCNC: 14.2 G/DL (ref 13.5–17.5)
HOLD SPECIMEN: NORMAL
MCH RBC QN AUTO: 28.3 PG (ref 26–34)
MCHC RBC AUTO-ENTMCNC: 33.6 G/DL (ref 32–36)
MCV RBC AUTO: 84 FL (ref 80–100)
NRBC BLD-RTO: 0 /100 WBCS (ref 0–0)
PLATELET # BLD AUTO: 170 X10*3/UL (ref 150–450)
POTASSIUM SERPL-SCNC: 4.1 MMOL/L (ref 3.5–5.3)
RBC # BLD AUTO: 5.02 X10*6/UL (ref 4.5–5.9)
SODIUM SERPL-SCNC: 136 MMOL/L (ref 136–145)
VANCOMYCIN SERPL-MCNC: 8.4 UG/ML (ref 5–20)
WBC # BLD AUTO: 11.4 X10*3/UL (ref 4.4–11.3)

## 2024-01-21 PROCEDURE — 2500000004 HC RX 250 GENERAL PHARMACY W/ HCPCS (ALT 636 FOR OP/ED)

## 2024-01-21 PROCEDURE — 85027 COMPLETE CBC AUTOMATED: CPT | Performed by: NURSE PRACTITIONER

## 2024-01-21 PROCEDURE — 80048 BASIC METABOLIC PNL TOTAL CA: CPT | Performed by: NURSE PRACTITIONER

## 2024-01-21 PROCEDURE — 82947 ASSAY GLUCOSE BLOOD QUANT: CPT

## 2024-01-21 PROCEDURE — 2500000004 HC RX 250 GENERAL PHARMACY W/ HCPCS (ALT 636 FOR OP/ED): Performed by: INTERNAL MEDICINE

## 2024-01-21 PROCEDURE — 80202 ASSAY OF VANCOMYCIN: CPT

## 2024-01-21 PROCEDURE — 1100000001 HC PRIVATE ROOM DAILY

## 2024-01-21 PROCEDURE — 36415 COLL VENOUS BLD VENIPUNCTURE: CPT | Performed by: NURSE PRACTITIONER

## 2024-01-21 PROCEDURE — 97165 OT EVAL LOW COMPLEX 30 MIN: CPT | Mod: GO | Performed by: OCCUPATIONAL THERAPIST

## 2024-01-21 PROCEDURE — 36415 COLL VENOUS BLD VENIPUNCTURE: CPT

## 2024-01-21 PROCEDURE — 97161 PT EVAL LOW COMPLEX 20 MIN: CPT | Mod: GP

## 2024-01-21 PROCEDURE — 2500000001 HC RX 250 WO HCPCS SELF ADMINISTERED DRUGS (ALT 637 FOR MEDICARE OP): Performed by: NURSE PRACTITIONER

## 2024-01-21 PROCEDURE — 2500000001 HC RX 250 WO HCPCS SELF ADMINISTERED DRUGS (ALT 637 FOR MEDICARE OP)

## 2024-01-21 PROCEDURE — 2500000004 HC RX 250 GENERAL PHARMACY W/ HCPCS (ALT 636 FOR OP/ED): Performed by: NURSE PRACTITIONER

## 2024-01-21 RX ADMIN — LISINOPRIL 10 MG: 10 TABLET ORAL at 08:08

## 2024-01-21 RX ADMIN — ROSUVASTATIN CALCIUM 10 MG: 10 TABLET, FILM COATED ORAL at 20:19

## 2024-01-21 RX ADMIN — FAMOTIDINE 20 MG: 20 TABLET, FILM COATED ORAL at 22:17

## 2024-01-21 RX ADMIN — ENOXAPARIN SODIUM 40 MG: 40 INJECTION SUBCUTANEOUS at 08:09

## 2024-01-21 RX ADMIN — SODIUM CHLORIDE, SODIUM LACTATE, POTASSIUM CHLORIDE, AND CALCIUM CHLORIDE 100 ML/HR: 600; 310; 30; 20 INJECTION, SOLUTION INTRAVENOUS at 03:16

## 2024-01-21 RX ADMIN — DOCUSATE SODIUM 100 MG: 100 CAPSULE, LIQUID FILLED ORAL at 20:19

## 2024-01-21 RX ADMIN — TRANEXAMIC ACID 1950 MG: 650 TABLET ORAL at 06:29

## 2024-01-21 RX ADMIN — CEFTRIAXONE SODIUM 1 G: 1 INJECTION, SOLUTION INTRAVENOUS at 20:19

## 2024-01-21 RX ADMIN — FAMOTIDINE 20 MG: 20 TABLET, FILM COATED ORAL at 11:18

## 2024-01-21 ASSESSMENT — ACTIVITIES OF DAILY LIVING (ADL)
ADL_ASSISTANCE: INDEPENDENT
ADL_ASSISTANCE: INDEPENDENT
BATHING_ASSISTANCE: MODERATE
LACK_OF_TRANSPORTATION: NO

## 2024-01-21 ASSESSMENT — PAIN SCALES - GENERAL
PAINLEVEL_OUTOF10: 2
PAINLEVEL_OUTOF10: 0 - NO PAIN
PAINLEVEL_OUTOF10: 0 - NO PAIN
PAINLEVEL_OUTOF10: 2

## 2024-01-21 ASSESSMENT — COGNITIVE AND FUNCTIONAL STATUS - GENERAL
MOVING FROM LYING ON BACK TO SITTING ON SIDE OF FLAT BED WITH BEDRAILS: A LOT
DRESSING REGULAR UPPER BODY CLOTHING: A LITTLE
STANDING UP FROM CHAIR USING ARMS: A LOT
HELP NEEDED FOR BATHING: A LOT
MOBILITY SCORE: 10
MOBILITY SCORE: 10
DRESSING REGULAR LOWER BODY CLOTHING: A LOT
TURNING FROM BACK TO SIDE WHILE IN FLAT BAD: A LOT
MOVING TO AND FROM BED TO CHAIR: A LOT
TOILETING: A LITTLE
DRESSING REGULAR LOWER BODY CLOTHING: A LOT
WALKING IN HOSPITAL ROOM: TOTAL
DAILY ACTIVITIY SCORE: 18
HELP NEEDED FOR BATHING: A LOT
MOVING FROM LYING ON BACK TO SITTING ON SIDE OF FLAT BED WITH BEDRAILS: A LOT
MOVING TO AND FROM BED TO CHAIR: A LOT
CLIMB 3 TO 5 STEPS WITH RAILING: TOTAL
DRESSING REGULAR UPPER BODY CLOTHING: A LITTLE
TURNING FROM BACK TO SIDE WHILE IN FLAT BAD: A LOT
DAILY ACTIVITIY SCORE: 18
STANDING UP FROM CHAIR USING ARMS: A LOT
CLIMB 3 TO 5 STEPS WITH RAILING: TOTAL
WALKING IN HOSPITAL ROOM: TOTAL
TOILETING: A LITTLE

## 2024-01-21 ASSESSMENT — PAIN - FUNCTIONAL ASSESSMENT
PAIN_FUNCTIONAL_ASSESSMENT: 0-10

## 2024-01-21 NOTE — PROGRESS NOTES
01/21/24 1404   Discharge Planning   Living Arrangements Alone   Support Systems Friends/neighbors;Spouse/significant other;Children;Family members   Assistance Needed PTA ind ADLS and IADLS no AD, drives, works FT  for [employer], no falls   Type of Residence Private residence  (1 level home)   Number of Stairs to Enter Residence 1   Number of Stairs Within Residence 0   Do you have animals or pets at home? No   Home or Post Acute Services In home services;Post acute facilities (Rehab/SNF/etc)   Patient expects to be discharged to: open to what is recommended- either acute rehab vs HHC   Does the patient need discharge transport arranged? No   Financial Resource Strain   How hard is it for you to pay for the very basics like food, housing, medical care, and heating? Not hard   Housing Stability   In the last 12 months, was there a time when you were not able to pay the mortgage or rent on time? N   In the last 12 months, how many places have you lived? 1   In the last 12 months, was there a time when you did not have a steady place to sleep or slept in a shelter (including now)? N   Transportation Needs   In the past 12 months, has lack of transportation kept you from medical appointments or from getting medications? no   In the past 12 months, has lack of transportation kept you from meetings, work, or from getting things needed for daily living? No     Pt s/p cortisone injection left knee developed severe pain, cx positive for streptococcus mitis, currently receiving IV Ceftriaxone. ID on consult. PT/OT eval recommend continued therapy at low-high intensity. Pt in agreement to whatever is recommended with choices as [facility] acute rehab vs  home care. Referral sent to [facility] to see if pt meets criteria.

## 2024-01-21 NOTE — CONSULTS
Consults        Primary MD: Alex Britt DO    Reason For Consult  Left knee septic arthritis    History Of Present Illness  Melchor Bergeron is a 63 y.o. male   Hypertension diabetes    Presented left knee pain and swelling failing outpatient management   status post cortisone injection 4 days prior developed severe pain about the knee       Cell count from right knee showed 129,000 white cells on 2023  Cultures from that fluid showing Streptococcus mitis oralis group    No crystals were seen in knee        Diagnosed with septic knee for irrigation debridement of left knee  2024                      OR Location: [facility] OR     Name: Melchor Bergeron, : 1960, Age: 63 y.o., MRN: [id number], Sex: male     Diagnosis  * No Diagnosis Codes entered * Post-op Diagnosis     * Pyogenic arthritis of left knee joint, due to unspecified organism (CMS/[facility]) [M00.9]      Procedures  Incision and Drainage Lower Extremity  40738 - [place] INCISION & DRAINAGE ABSCESS SIMPLE/SINGLE     [place] ARTHROSCOPY KNEE SYNOVECTOMY LIMITED SPX [76989]        Past Medical History  He has a past medical history of Tendinitis (2023).   history of tricompartmental knee arthritis               Surgical History  He has a past surgical history that includes Other surgical history (12/10/2020) and Other surgical history (12/10/2020).     Social History  He reports that he has never smoked. He has never been exposed to tobacco smoke. He has never used smokeless tobacco. He reports that he does not drink alcohol and does not use drugs.   Travel Screening       Question Response    Do you have any of the following new or worsening symptoms? None of these    In the last 10 days, have you been in contact with someone who was confirmed or suspected to have Coronavirus/COVID-19? No / Unsure    Have you had a COVID-19 viral test in the last 10 days? No    Have you traveled internationally or domestically in the last month? No          Travel History    Travel since 12/20/23    No documented travel since 12/20/23      Family History  Family History   Problem Relation Name Age of Onset    Cancer Mother      Heart disease Father      Dementia Father      Diabetes Sister x1     No Known Problems Brother x2      Allergies  Sulfa (sulfonamide antibiotics) and Metformin     Immunization History   Administered Date(s) Administered    Flu vaccine (IIV4), preservative free *Check age/dose* 09/23/2021    Flu vaccine, quadrivalent, no egg protein, age 6 month or greater (FLUCELVAX) 09/20/2022, 09/12/2023    Influenza, Unspecified 10/03/2019, 09/30/2020    Influenza, seasonal, injectable 09/28/2018    Influenza, seasonal, injectable, preservative free 10/25/2016, 09/14/2017    Pfizer COVID-19 vaccine, Fall 2023, 12 years and older, (30mcg/0.3mL) 09/25/2023    Pfizer COVID-19 vaccine, bivalent, age 12 years and older (30 mcg/0.3 mL) 09/30/2022    Pfizer Gray Cap SARS-CoV-2 04/05/2022    Pfizer Purple Cap SARS-CoV-2 03/06/2021, 03/27/2021, 10/18/2021    Pneumococcal polysaccharide vaccine, 23-valent, age 2 years and older (PNEUMOVAX 23) 08/23/2016    Tdap vaccine, age 7 year and older (BOOSTRIX) 08/23/2016    Zoster, live 11/14/2013     Review of Systems   Constitutional: Negative.    HENT: Negative.     Eyes: Negative.    Respiratory: Negative.     Cardiovascular: Negative.    Gastrointestinal: Negative.    Endocrine: Negative.    Genitourinary: Negative.    Musculoskeletal:  Positive for arthralgias, joint swelling and myalgias.   Skin:  Positive for wound.   Hematological: Negative.         Physical Exam  Constitutional:       Appearance: He is not ill-appearing.   Eyes:      Extraocular Movements: Extraocular movements intact.      Pupils: Pupils are equal, round, and reactive to light.   Neck:      Vascular: No carotid bruit.   Cardiovascular:      Heart sounds: Normal heart sounds. No murmur heard.  Pulmonary:      Effort: Pulmonary effort is normal. No respiratory  "distress.      Breath sounds: Normal breath sounds. No stridor. No wheezing, rhonchi or rales.   Chest:      Chest wall: No tenderness.   Abdominal:      General: Abdomen is flat. Bowel sounds are normal. There is no distension.      Palpations: There is no mass.      Tenderness: There is no abdominal tenderness. There is no right CVA tenderness, left CVA tenderness, guarding or rebound.      Hernia: No hernia is present.   Musculoskeletal:         General: Swelling and tenderness present.      Cervical back: Neck supple. No rigidity or tenderness.   Lymphadenopathy:      Cervical: No cervical adenopathy.   Skin:     Findings: Erythema present.   Neurological:      General: No focal deficit present.          Range of Vitals (last 24 hours)  Heart Rate:  []   Temp:  [36.6 °C (97.9 °F)-37.1 °C (98.8 °F)]   Resp:  [9-22]   BP: (120-159)/(72-96)   Height:  [175.3 cm (5' 9\")]   Weight:  [75.1 kg (165 lb 9.1 oz)]   SpO2:  [94 %-100 %]     Relevant Results  Results from last 72 hours   Lab Units 01/20/24  0746   WBC AUTO x10*3/uL 11.2   HEMOGLOBIN g/dL 15.6   HEMATOCRIT % 46.3   PLATELETS AUTO x10*3/uL 171     Results from last 72 hours   Lab Units 01/20/24  0746   SODIUM mmol/L 135*   POTASSIUM mmol/L 4.1   CHLORIDE mmol/L 103   CO2 mmol/L 16*   BUN mg/dL 31*   CREATININE mg/dL 1.39*   GLUCOSE mg/dL 144*   CALCIUM mg/dL 9.5   ANION GAP mmol/L 20   EGFR mL/min/1.73m*2 57*         Estimated Creatinine Clearance: 55.1 mL/min (A) (by C-G formula based on SCr of 1.39 mg/dL (H)).  No results found for: \"CRP\", \"SEDRATE\"  No results found for: \"HIV1X2\", \"HIVCONF\", \"RFZAVK5ZO\"  No results found for: \"HCVPCRQUANT\"  Cultures  Susceptibility data from last 14 days.  Collected Specimen Info Organism   01/19/24 Fluid from Synovial Streptococcus mitis/oralis group          Assessment/Plan     Left knee septic arthritis with Streptococcus mitis    Switch to ceftriaxone    "

## 2024-01-21 NOTE — PROGRESS NOTES
Verification of Patient's Own Meds  Does the patient name on the medication container/label match the inpatient patient name? Yes  Is the medication in date (1 year from dispensing date)? Yes  Is the medication in the original dispensing container? Yes    Identification of Medication  Was the medication successfully identified? Yes  Was the Authorization for Treatment with Patient’s Own Medication” form (Attachment A in MM-16) completed? N/A

## 2024-01-21 NOTE — CARE PLAN
Problem: Fall/Injury  Goal: Not fall by end of shift  Outcome: Progressing  Goal: Be free from injury by end of the shift  Outcome: Progressing  Goal: Verbalize understanding of personal risk factors for fall in the hospital  Outcome: Progressing  Goal: Verbalize understanding of risk factor reduction measures to prevent injury from fall in the home  Outcome: Progressing  Goal: Use assistive devices by end of the shift  Outcome: Progressing  Goal: Pace activities to prevent fatigue by end of the shift  Outcome: Progressing     Problem: Pain  Goal: Takes deep breaths with improved pain control throughout the shift  Outcome: Progressing  Goal: Turns in bed with improved pain control throughout the shift  Outcome: Progressing  Goal: Walks with improved pain control throughout the shift  Outcome: Progressing  Goal: Performs ADL's with improved pain control throughout shift  Outcome: Progressing  Goal: Participates in PT with improved pain control throughout the shift  Outcome: Progressing  Goal: Free from opioid side effects throughout the shift  Outcome: Progressing  Goal: Free from acute confusion related to pain meds throughout the shift  Outcome: Progressing     Problem: Safety - Adult  Goal: Free from fall injury  Outcome: Progressing     Problem: Discharge Planning  Goal: Discharge to home or other facility with appropriate resources  Outcome: Progressing     Problem: Chronic Conditions and Co-morbidities  Goal: Patient's chronic conditions and co-morbidity symptoms are monitored and maintained or improved  Outcome: Progressing     Problem: Diabetes  Goal: Achieve decreasing blood glucose levels by end of shift  Outcome: Progressing  Goal: Increase stability of blood glucose readings by end of shift  Outcome: Progressing  Goal: Decrease in ketones present in urine by end of shift  Outcome: Progressing  Goal: Maintain electrolyte levels within acceptable range throughout shift  Outcome: Progressing  Goal:  Maintain glucose levels >70mg/dl to <250mg/dl throughout shift  Outcome: Progressing  Goal: No changes in neurological exam by end of shift  Outcome: Progressing  Goal: Learn about and adhere to nutrition recommendations by end of shift  Outcome: Progressing  Goal: Vital signs within normal range for age by end of shift  Outcome: Progressing  Goal: Increase self care and/or family involovement by end of shift  Outcome: Progressing  Goal: Receive DSME education by end of shift  Outcome: Progressing

## 2024-01-21 NOTE — PROGRESS NOTES
Prosper Jeff MD  Physician  Infectious Disease          Signed               Primary MD: Alex Britt DO     Reason For Consult  Left knee septic arthritis     History Of Present Illness  Melchor Bergeron is a 63 y.o. male   Hypertension diabetes     Presented left knee pain and swelling failing outpatient management   status post cortisone injection 4 days prior developed severe pain about the knee         Cell count from right knee showed 129,000 white cells on 2023  Cultures from that fluid showing Streptococcus mitis oralis group     No crystals were seen in knee           Diagnosed with septic knee for irrigation debridement of left knee  2024                      OR Location: [facility]     Name: Melchor Bergeron, : 1960, Age: 63 y.o., MRN: [id number], Sex: male     Diagnosis  * No Diagnosis Codes entered * Post-op Diagnosis     * Pyogenic arthritis of left knee joint, due to unspecified organism (CMS/[facility]) [M00.9]      Procedures  Incision and Drainage Lower Extremity  09689 - [place] INCISION & DRAINAGE ABSCESS SIMPLE/SINGLE     [place] ARTHROSCOPY KNEE SYNOVECTOMY LIMITED SPX [79230]             Review of Systems   Constitutional: Negative.    HENT: Negative.     Eyes: Negative.    Respiratory: Negative.     Cardiovascular: Negative.    Gastrointestinal: Negative.    Endocrine: Negative.    Genitourinary: Negative.    Musculoskeletal:  Positive for arthralgias, joint swelling and myalgias.   Skin:  Positive for wound.   Hematological: Negative.          Physical Exam  Constitutional:       Appearance: He is not ill-appearing.   Eyes:      Extraocular Movements: Extraocular movements intact.      Pupils: Pupils are equal, round, and reactive to light.   Neck:      Vascular: No carotid bruit.   Cardiovascular:      Heart sounds: Normal heart sounds. No murmur heard.  Pulmonary:      Effort: Pulmonary effort is normal. No respiratory distress.      Breath sounds: Normal breath sounds. No stridor. No  "wheezing, rhonchi or rales.   Chest:      Chest wall: No tenderness.   Abdominal:      General: Abdomen is flat. Bowel sounds are normal. There is no distension.      Palpations: There is no mass.      Tenderness: There is no abdominal tenderness. There is no right CVA tenderness, left CVA tenderness, guarding or rebound.      Hernia: No hernia is present.   Musculoskeletal:         General: Swelling and tenderness present.      Cervical back: Neck supple. No rigidity or tenderness.   Lymphadenopathy:      Cervical: No cervical adenopathy.   Skin:     Findings: Erythema present.   Neurological:      General: No focal deficit present.            Range of Vitals (last 24 hours)  Heart Rate:  []   Temp:  [36.6 °C (97.9 °F)-37.1 °C (98.8 °F)]   Resp:  [9-22]   BP: (120-159)/(72-96)   Height:  [175.3 cm (5' 9\")]   Weight:  [75.1 kg (165 lb 9.1 oz)]   SpO2:  [94 %-100 %]      Relevant Results       Results from last 72 hours   Lab Units 01/20/24  0746   WBC AUTO x10*3/uL 11.2   HEMOGLOBIN g/dL 15.6   HEMATOCRIT % 46.3   PLATELETS AUTO x10*3/uL 171           Results from last 72 hours   Lab Units 01/20/24  0746   SODIUM mmol/L 135*   POTASSIUM mmol/L 4.1   CHLORIDE mmol/L 103   CO2 mmol/L 16*   BUN mg/dL 31*   CREATININE mg/dL 1.39*   GLUCOSE mg/dL 144*   CALCIUM mg/dL 9.5   ANION GAP mmol/L 20   EGFR mL/min/1.73m*2 57*      Susceptibility data from last 14 days.  Collected Specimen Info Organism   01/19/24 Fluid from Synovial Streptococcus mitis/oralis group               Assessment/Plan   Left knee septic arthritis with Streptococcus mitis     Switch to ceftriaxone                          "

## 2024-01-21 NOTE — PROGRESS NOTES
Occupational Therapy    Evaluation    Patient Name: Melchor Bergeron  MRN: [id number]  Today's Date: 1/21/2024  Time Calculation  Start Time: 0851  Stop Time: 0908  Time Calculation (min): 17 min    Assessment  IP OT Assessment  End of Session Communication: Bedside nurse  End of Session Patient Position: Up in chair, Alarm on    Plan:  Treatment Interventions: ADL retraining  OT Frequency: 3 times per week  OT Discharge Recommendations: Low intensity level of continued care, Moderate intensity level of continued care  Equipment Recommended upon Discharge: Wheeled walker    Subjective     Current Problem:  1. Arthritis, septic (CMS/[facility])        2. Pyogenic arthritis of left knee joint, due to unspecified organism (CMS/[facility])        3. Infection and inflammatory reaction due to vascular device, implant, and graft, initial encounter (CMS/[facility])  Sterile Fluid Culture/Smear    Sterile Fluid Culture/Smear          General:  General  Reason for Referral: I&D L knee  Referred By: OT/PT  Juarez  1/20  Past Medical History Relevant to Rehab: HTN , OA, Dm2, GERD,  HLD.  Prior to Session Communication: Bedside nurse  Patient Position Received: Bed, 3 rail up, Alarm on  General Comment: pt is a 62 yo male came to the hospital on 1/20 with reports of pain and swelling L knee . pt had cortisone injection on 1/15 .  pt dx L knee septic arthritis . pt underwent L knee I and D with Dr. Marti.  pt currently NWB L LE and immobilizer on L knee at all time except for hyigene .    Precautions:  LE Weight Bearing Status: Left Non-Weight Bearing  Medical Precautions: Fall precautions  Post-Surgical Precautions: Other (comment) (Knee immobilzer on at all times except for hygiene)      Pain:  Pain Assessment  Pain Assessment: 0-10  Pain Score: 2  Pain Type: Surgical pain  Pain Location: Knee  Pain Orientation: Left    Objective     Cognition:  Overall Cognitive Status: Within Functional Limits  Orientation Level: Oriented X4        Home  Living:  Type of Home: House  Lives With: Alone  Home Adaptive Equipment: None  Home Layout: One level  Home Access: Level entry  Bathroom Shower/Tub: Tub/shower unit  Bathroom Equipment: Grab bars in shower     Prior Function:  Level of [place]: Independent with ADLs and functional transfers, Independent with homemaking with ambulation  ADL Assistance: Independent  Homemaking Assistance: Independent  Ambulatory Assistance: Independent  Vocational: Full time employment  Prior Function Comments: per pt ind with all mobility, adls and iadls works full time drives . no falls      ADL:  Eating Assistance: Independent  Grooming Assistance: Independent  Bathing Assistance: Moderate  UE Dressing Assistance: Minimal  LE Dressing Assistance: Moderate  Toileting Assistance with Device: Minimal    Activity Tolerance:       Bed Mobility/Transfers:   Bed Mobility  Bed Mobility: Yes  Bed Mobility 1  Bed Mobility 1: Supine to sitting, Sitting to supine, Scooting  Level of Assistance 1: Moderate assistance  Bed Mobility Comments 1: mod A x 2 to EOB .  Transfers  Transfer: Yes  Transfer 1  Technique 1: Sit to stand, Stand to sit, Stand pivot  Transfer Device 1: Walker  Transfer Level of Assistance 1: Moderate assistance  Trials/Comments 1: mod A x 1 with FWW cues for NWB LE . L knee immobilizer .      Strength:  Strength Comments: BUE 4-/5    Extremities: RUE   RUE : Within Functional Limits and LUE   LUE: Within Functional Limits    Outcome Measures: [facility] Daily Activity  Putting on and taking off regular lower body clothing: A lot  Bathing (including washing, rinsing, drying): A lot  Putting on and taking off regular upper body clothing: A little  Toileting, which includes using toilet, bedpan or urinal: A little  Taking care of personal grooming such as brushing teeth: None  Eating Meals: None  Daily Activity - Total Score: 18       EDUCATION:     Education Documentation  ADL Training, taught by Jany Mishra OT at 1/21/2024   2:00 PM.  Learner: Patient  Readiness: Acceptance  Method: Explanation  Response: Verbalizes Understanding    Education Comments  No comments found.      Goals:   Encounter Problems       Encounter Problems (Active)       OT Goals       OT Goal 1 (Progressing)       Start:  01/21/24    Expected End:  02/04/24       Patient will complete UB bathing and dressing with Mod I/Independent with AE as needed         OT Goal 2 (Progressing)       Start:  01/21/24    Expected End:  02/04/24       Patient will complete LB bathing and dressing with CGA/SBA and with AE as needed.         OT Goal 3 (Progressing)       Start:  01/21/24    Expected End:  02/04/24       Patient will complete all transfers for ADLs and functional activities with CGA/Min A with FWW as needed.

## 2024-01-21 NOTE — PROGRESS NOTES
Physical Therapy    Physical Therapy Evaluation    Patient Name: Melchor Bergeron  MRN: [id number]  Today's Date: 1/21/2024   Time Calculation  Start Time: 0849  Stop Time: 0907  Time Calculation (min): 18 min    Assessment/Plan   PT Assessment  PT Assessment Results: Decreased strength, Impaired balance, Decreased endurance, Decreased mobility, Pain, Orthopedic restrictions  Rehab Prognosis: Good  Evaluation/Treatment Tolerance: Patient limited by pain, Patient limited by fatigue  End of Session Communication: Bedside nurse  Assessment Comment: pt with decreased mobility/gait strength balance and endurance pt to benefit from skilled PT to address deficits and improve functional mobility .  End of Session Patient Position: Up in chair, Alarm on (B LE elevated . significant other present .)  IP OR SWING BED PT PLAN  Inpatient or Swing Bed: Inpatient  PT Plan  Treatment/Interventions: Bed mobility, Transfer training, Gait training, Stair training, Balance training, Strengthening, Endurance training, Therapeutic activity, Therapeutic exercise, Home exercise program  PT Plan: Skilled PT  PT Frequency: 5 times per week  PT Discharge Recommendations: Low intensity level of continued care, High intensity level of continued care  Equipment Recommended upon Discharge: Wheeled walker  PT Recommended Transfer Status: Assist x1, Assistive device  PT - OK to Discharge: Yes (once medically cleared for discharge to next level of care.)    Subjective     Current Problem:  Patient Active Problem List   Diagnosis    Hypertension    Hyperlipidemia    Chronic pain of left knee    Pain in posterior left lower extremity    Primary osteoarthritis of left knee    Type 2 diabetes mellitus with microalbuminuria, without long-term current use of insulin (CMS/[facility])    Gastroesophageal reflux disease    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  Prior to Session Communication: Bedside nurse (cleared for therapy evals.)  Patient Position Received: Bed, 3 rail up, Alarm on (L knee immobilizer . IV)  General Comment: pt is a 62 yo male came to the hospital on 1/20 with reports of pain and swelling L knee . pt had cortisone injection on 1/15 .  pt dx L knee septic arthritis . pt underwent L knee I and D with Dr. aMrti.  pt currently NWB L LE and immobilizer on L knee at all time except for hyigene .    Home Living:  Home Living  Type of Home: House  Lives With: Alone  Home Adaptive Equipment: None  Home Layout: One level  Home Access: Level entry  Bathroom Shower/Tub: Tub/shower unit  Bathroom Equipment: Grab bars in shower    Prior Level of Function:  Prior Function Per Pt/Caregiver Report  Level of [place]: Independent with ADLs and functional transfers, Independent with homemaking with ambulation  ADL Assistance: Independent  Homemaking Assistance: Independent  Ambulatory Assistance: Independent  Vocational: Full time employment  Prior Function Comments: per pt ind with all mobility, adls and iadls works full time drives . no falls    Precautions:  Precautions  LE Weight Bearing Status: Left Non-Weight Bearing  Medical Precautions: Fall precautions  Post-Surgical Precautions:  (L knee immobilizer on at all times except for hyigene ,NO ROM .)    Objective     Pain:  Pain Assessment  Pain Assessment: 0-10  Pain Score: 2  Pain Type: Surgical pain  Pain Location: Knee  Pain Orientation: Left    Cognition:  Cognition  Overall Cognitive Status: Within Functional Limits  Orientation Level: Oriented X4    General Assessments:     Strength  Strength Comments: R LE 5/5 L LE hip 3+/5 knee NA immobilzer ankle 4/5    Dynamic Sitting Balance  Dynamic Sitting-Comments: fair  Dynamic Standing Balance  Dynamic Standing-Comments: fair -    Functional  Assessments:     Bed Mobility  Bed Mobility: Yes  Bed Mobility 1  Bed Mobility 1: Supine to sitting, Sitting to supine, Scooting  Level of Assistance 1: Moderate assistance  Bed Mobility Comments 1: mod A x 2 to EOB .  Transfers  Transfer: Yes  Transfer 1  Technique 1: Sit to stand, Stand to sit, Stand pivot  Transfer Device 1: Walker (FWW)  Transfer Level of Assistance 1: Moderate assistance  Trials/Comments 1: mod A x 1 with FWW cues for NWB LE . L knee immobilizer .  Ambulation/Gait Training  Ambulation/Gait Training Performed: No (SPT to chair . mod A x 1)    Extremity/Trunk Assessments:    RLE   RLE : Within Functional Limits  LLE   LLE :  (L Knee NA .    ankle and hip WFL)    Outcome Measures:  [facility] Basic Mobility  Turning from your back to your side while in a flat bed without using bedrails: A lot  Moving from lying on your back to sitting on the side of a flat bed without using bedrails: A lot  Moving to and from bed to chair (including a wheelchair): A lot  Standing up from a chair using your arms (e.g. wheelchair or bedside chair): A lot  To walk in hospital room: Total  Climbing 3-5 steps with railing: Total  Basic Mobility - Total Score: 10    Goals:  Encounter Problems       Encounter Problems (Active)       PT Problem       Pt will demonstrate mod I with bed mobility to edge of bed.         Start:  01/21/24    Expected End:  02/04/24            Pt will demonstrate mod I  with sit to stand/chair transfers with FWW.         Start:  01/21/24    Expected End:  02/04/24            Pt will ambulate 50 feet with FWW mod I NWB L LE .         Start:  01/21/24    Expected End:  02/04/24            Pt to demo improved BLE strength by being able to complete supine/seated thera ex 2x20 BLEs with 4 or less rest breaks .         Start:  01/21/24    Expected End:  02/04/24                 Education Documentation  Precautions, taught by Lee Bautista, PT at 1/21/2024 11:42 AM.  Learner: Patient  Readiness:  Acceptance  Method: Explanation  Response: Verbalizes Understanding    Mobility Training, taught by Lee Bautista PT at 1/21/2024 11:42 AM.  Learner: Patient  Readiness: Acceptance  Method: Explanation  Response: Verbalizes Understanding    Education Comments  No comments found.

## 2024-01-21 NOTE — PROGRESS NOTES
PROGRESS NOTE    Patient postoperatively remains hemodynamically stable.  Blood glucose stable on trend.  Blood pressure with moderate elevation this morning however patient pending to receive his antihypertensives.  No further recommendations at this time, hospital medicine will follow peripherally.   no

## 2024-01-21 NOTE — PROGRESS NOTES
DAILY PROGRESS NOTE      Hospital Day: no2    Patient doing well  Visit Vitals  /81 (Patient Position: Sitting)   Pulse 83   Temp 37.8 °C (100 °F)   Resp 16      Temp (24hrs), Av.3 °C (99.1 °F), Min:36.8 °C (98.2 °F), Max:37.8 °C (100 °F)       Pain Control good  No chest pain or shortness of breath.  No calf pain    Exam:   Patient is in the knee immobilizer dressing is in place there is no drainage she is neurologically intact distally brisk cap refill compartments are soft calf is nontender.  Drain with scant output now but did have some drainage this morning  Extremity shows neuro vascular status intact. Flexion and extension intact on extremity.  Calves soft and non-tender without evidence of DVT.          I&O  I/O last 3 completed shifts:  In: 1530 (20.4 mL/kg) [P.O.:380; I.V.:550 (7.3 mL/kg); IV Piggyback:600]  Out: 1305 (17.4 mL/kg) [Urine:1280 (0.5 mL/kg/hr); Drains:25]  Weight: 75.1 kg       Assessment: Status post irrigation debridement knee infection    Plan: Continue with antibiotics per infectious disease.  Will discuss with Dr. Coates regarding drain removal and beginning range of motion activities.  Dressing changed today.    Agustin Eduardo MD MD  2024 12:40 PM

## 2024-01-22 ENCOUNTER — APPOINTMENT (OUTPATIENT)
Dept: CARDIOLOGY | Facility: HOSPITAL | Age: 64
DRG: 856 | End: 2024-01-22
Payer: COMMERCIAL

## 2024-01-22 ENCOUNTER — TELEPHONE (OUTPATIENT)
Dept: PHARMACY | Facility: HOSPITAL | Age: 64
End: 2024-01-22
Payer: COMMERCIAL

## 2024-01-22 ENCOUNTER — APPOINTMENT (OUTPATIENT)
Dept: RADIOLOGY | Facility: HOSPITAL | Age: 64
DRG: 856 | End: 2024-01-22
Payer: COMMERCIAL

## 2024-01-22 LAB
ANION GAP SERPL CALC-SCNC: 13 MMOL/L (ref 10–20)
BACTERIA FLD CULT: ABNORMAL
BUN SERPL-MCNC: 22 MG/DL (ref 6–23)
CALCIUM SERPL-MCNC: 8.6 MG/DL (ref 8.6–10.3)
CARDIAC TROPONIN I PNL SERPL HS: 23 NG/L (ref 0–20)
CARDIAC TROPONIN I PNL SERPL HS: 23 NG/L (ref 0–20)
CHLORIDE SERPL-SCNC: 103 MMOL/L (ref 98–107)
CO2 SERPL-SCNC: 24 MMOL/L (ref 21–32)
CREAT SERPL-MCNC: 0.94 MG/DL (ref 0.5–1.3)
EGFRCR SERPLBLD CKD-EPI 2021: >90 ML/MIN/1.73M*2
ERYTHROCYTE [DISTWIDTH] IN BLOOD BY AUTOMATED COUNT: 13.9 % (ref 11.5–14.5)
GLUCOSE BLD MANUAL STRIP-MCNC: 132 MG/DL (ref 74–99)
GLUCOSE BLD MANUAL STRIP-MCNC: 157 MG/DL (ref 74–99)
GLUCOSE BLD MANUAL STRIP-MCNC: 165 MG/DL (ref 74–99)
GLUCOSE BLD MANUAL STRIP-MCNC: 194 MG/DL (ref 74–99)
GLUCOSE BLD MANUAL STRIP-MCNC: 62 MG/DL (ref 74–99)
GLUCOSE SERPL-MCNC: 58 MG/DL (ref 74–99)
GRAM STN SPEC: ABNORMAL
GRAM STN SPEC: ABNORMAL
HCT VFR BLD AUTO: 41.9 % (ref 41–52)
HGB BLD-MCNC: 14.2 G/DL (ref 13.5–17.5)
HOLD SPECIMEN: NORMAL
MCH RBC QN AUTO: 28.2 PG (ref 26–34)
MCHC RBC AUTO-ENTMCNC: 33.9 G/DL (ref 32–36)
MCV RBC AUTO: 83 FL (ref 80–100)
NRBC BLD-RTO: 0 /100 WBCS (ref 0–0)
PLATELET # BLD AUTO: 164 X10*3/UL (ref 150–450)
POTASSIUM SERPL-SCNC: 4.1 MMOL/L (ref 3.5–5.3)
RBC # BLD AUTO: 5.03 X10*6/UL (ref 4.5–5.9)
SODIUM SERPL-SCNC: 136 MMOL/L (ref 136–145)
WBC # BLD AUTO: 9.3 X10*3/UL (ref 4.4–11.3)

## 2024-01-22 PROCEDURE — 84484 ASSAY OF TROPONIN QUANT: CPT | Performed by: REGISTERED NURSE

## 2024-01-22 PROCEDURE — 2500000001 HC RX 250 WO HCPCS SELF ADMINISTERED DRUGS (ALT 637 FOR MEDICARE OP)

## 2024-01-22 PROCEDURE — 99232 SBSQ HOSP IP/OBS MODERATE 35: CPT | Performed by: REGISTERED NURSE

## 2024-01-22 PROCEDURE — 2500000005 HC RX 250 GENERAL PHARMACY W/O HCPCS: Performed by: NURSE PRACTITIONER

## 2024-01-22 PROCEDURE — 93005 ELECTROCARDIOGRAM TRACING: CPT

## 2024-01-22 PROCEDURE — 2500000004 HC RX 250 GENERAL PHARMACY W/ HCPCS (ALT 636 FOR OP/ED)

## 2024-01-22 PROCEDURE — 82947 ASSAY GLUCOSE BLOOD QUANT: CPT

## 2024-01-22 PROCEDURE — 2500000004 HC RX 250 GENERAL PHARMACY W/ HCPCS (ALT 636 FOR OP/ED): Performed by: NURSE PRACTITIONER

## 2024-01-22 PROCEDURE — 93010 ELECTROCARDIOGRAM REPORT: CPT | Performed by: INTERNAL MEDICINE

## 2024-01-22 PROCEDURE — 97530 THERAPEUTIC ACTIVITIES: CPT | Mod: GP,CQ

## 2024-01-22 PROCEDURE — 1100000001 HC PRIVATE ROOM DAILY

## 2024-01-22 PROCEDURE — 73030 X-RAY EXAM OF SHOULDER: CPT | Mod: LT

## 2024-01-22 PROCEDURE — 80048 BASIC METABOLIC PNL TOTAL CA: CPT | Performed by: NURSE PRACTITIONER

## 2024-01-22 PROCEDURE — 2500000001 HC RX 250 WO HCPCS SELF ADMINISTERED DRUGS (ALT 637 FOR MEDICARE OP): Performed by: NURSE PRACTITIONER

## 2024-01-22 PROCEDURE — 36415 COLL VENOUS BLD VENIPUNCTURE: CPT | Performed by: REGISTERED NURSE

## 2024-01-22 PROCEDURE — 36415 COLL VENOUS BLD VENIPUNCTURE: CPT | Performed by: NURSE PRACTITIONER

## 2024-01-22 PROCEDURE — 73030 X-RAY EXAM OF SHOULDER: CPT | Mod: LEFT SIDE | Performed by: RADIOLOGY

## 2024-01-22 PROCEDURE — 97116 GAIT TRAINING THERAPY: CPT | Mod: GP,CQ

## 2024-01-22 PROCEDURE — 2500000004 HC RX 250 GENERAL PHARMACY W/ HCPCS (ALT 636 FOR OP/ED): Performed by: INTERNAL MEDICINE

## 2024-01-22 PROCEDURE — 85027 COMPLETE CBC AUTOMATED: CPT | Performed by: NURSE PRACTITIONER

## 2024-01-22 RX ORDER — MIDAZOLAM HYDROCHLORIDE 1 MG/ML
INJECTION, SOLUTION INTRAMUSCULAR; INTRAVENOUS AS NEEDED
Status: DISCONTINUED | OUTPATIENT
Start: 2024-01-20 | End: 2024-01-22

## 2024-01-22 RX ORDER — LIDOCAINE 560 MG/1
1 PATCH PERCUTANEOUS; TOPICAL; TRANSDERMAL DAILY PRN
Status: DISCONTINUED | OUTPATIENT
Start: 2024-01-22 | End: 2024-02-01 | Stop reason: HOSPADM

## 2024-01-22 RX ADMIN — DEXTROSE MONOHYDRATE 25 G: 25 INJECTION, SOLUTION INTRAVENOUS at 06:38

## 2024-01-22 RX ADMIN — LISINOPRIL 10 MG: 10 TABLET ORAL at 09:42

## 2024-01-22 RX ADMIN — LIDOCAINE 1 PATCH: 4 PATCH TOPICAL at 18:59

## 2024-01-22 RX ADMIN — SODIUM CHLORIDE, SODIUM LACTATE, POTASSIUM CHLORIDE, AND CALCIUM CHLORIDE 100 ML/HR: 600; 310; 30; 20 INJECTION, SOLUTION INTRAVENOUS at 04:16

## 2024-01-22 RX ADMIN — ENOXAPARIN SODIUM 40 MG: 40 INJECTION SUBCUTANEOUS at 09:42

## 2024-01-22 RX ADMIN — CEFTRIAXONE SODIUM 1 G: 1 INJECTION, SOLUTION INTRAVENOUS at 21:03

## 2024-01-22 RX ADMIN — CYCLOBENZAPRINE HYDROCHLORIDE 10 MG: 10 TABLET, FILM COATED ORAL at 01:15

## 2024-01-22 RX ADMIN — ROSUVASTATIN CALCIUM 10 MG: 10 TABLET, FILM COATED ORAL at 21:03

## 2024-01-22 RX ADMIN — INSULIN LISPRO 1 UNITS: 100 INJECTION, SOLUTION INTRAVENOUS; SUBCUTANEOUS at 11:52

## 2024-01-22 RX ADMIN — OXYCODONE HYDROCHLORIDE 5 MG: 5 TABLET ORAL at 01:15

## 2024-01-22 RX ADMIN — OXYCODONE HYDROCHLORIDE 5 MG: 5 TABLET ORAL at 09:42

## 2024-01-22 RX ADMIN — FAMOTIDINE 20 MG: 20 TABLET, FILM COATED ORAL at 11:52

## 2024-01-22 RX ADMIN — INSULIN LISPRO 1 UNITS: 100 INJECTION, SOLUTION INTRAVENOUS; SUBCUTANEOUS at 21:03

## 2024-01-22 RX ADMIN — INSULIN LISPRO 1 UNITS: 100 INJECTION, SOLUTION INTRAVENOUS; SUBCUTANEOUS at 16:58

## 2024-01-22 RX ADMIN — DOCUSATE SODIUM 100 MG: 100 CAPSULE, LIQUID FILLED ORAL at 09:42

## 2024-01-22 ASSESSMENT — COGNITIVE AND FUNCTIONAL STATUS - GENERAL
DRESSING REGULAR LOWER BODY CLOTHING: A LITTLE
MOVING TO AND FROM BED TO CHAIR: A LITTLE
STANDING UP FROM CHAIR USING ARMS: A LOT
MOBILITY SCORE: 16
DAILY ACTIVITIY SCORE: 21
MOBILITY SCORE: 11
MOVING FROM LYING ON BACK TO SITTING ON SIDE OF FLAT BED WITH BEDRAILS: A LOT
TOILETING: A LITTLE
MOBILITY SCORE: 16
DRESSING REGULAR LOWER BODY CLOTHING: A LITTLE
DAILY ACTIVITIY SCORE: 21
TOILETING: A LITTLE
STANDING UP FROM CHAIR USING ARMS: A LITTLE
MOVING FROM LYING ON BACK TO SITTING ON SIDE OF FLAT BED WITH BEDRAILS: A LITTLE
WALKING IN HOSPITAL ROOM: A LITTLE
TURNING FROM BACK TO SIDE WHILE IN FLAT BAD: A LITTLE
HELP NEEDED FOR BATHING: A LITTLE
WALKING IN HOSPITAL ROOM: A LOT
TURNING FROM BACK TO SIDE WHILE IN FLAT BAD: A LOT
CLIMB 3 TO 5 STEPS WITH RAILING: TOTAL
MOVING FROM LYING ON BACK TO SITTING ON SIDE OF FLAT BED WITH BEDRAILS: A LITTLE
HELP NEEDED FOR BATHING: A LITTLE
TURNING FROM BACK TO SIDE WHILE IN FLAT BAD: A LITTLE
MOVING TO AND FROM BED TO CHAIR: A LITTLE
CLIMB 3 TO 5 STEPS WITH RAILING: TOTAL
STANDING UP FROM CHAIR USING ARMS: A LITTLE
WALKING IN HOSPITAL ROOM: A LITTLE
MOVING TO AND FROM BED TO CHAIR: A LOT
CLIMB 3 TO 5 STEPS WITH RAILING: TOTAL

## 2024-01-22 ASSESSMENT — PAIN - FUNCTIONAL ASSESSMENT
PAIN_FUNCTIONAL_ASSESSMENT: 0-10

## 2024-01-22 ASSESSMENT — PAIN SCALES - GENERAL
PAINLEVEL_OUTOF10: 0 - NO PAIN
PAINLEVEL_OUTOF10: 7
PAINLEVEL_OUTOF10: 3
PAINLEVEL_OUTOF10: 5 - MODERATE PAIN
PAINLEVEL_OUTOF10: 6
PAINLEVEL_OUTOF10: 5 - MODERATE PAIN

## 2024-01-22 ASSESSMENT — PAIN DESCRIPTION - ORIENTATION
ORIENTATION: LEFT
ORIENTATION: LEFT

## 2024-01-22 ASSESSMENT — PAIN DESCRIPTION - DESCRIPTORS
DESCRIPTORS: ACHING
DESCRIPTORS: ACHING

## 2024-01-22 ASSESSMENT — PAIN DESCRIPTION - LOCATION
LOCATION: KNEE
LOCATION: KNEE

## 2024-01-22 NOTE — PROGRESS NOTES
Accepted by Acute Rehab awaiting completion of precertf process. HANS Paige following for TCC.

## 2024-01-22 NOTE — PROGRESS NOTES
Melchor Bergeron is a 63 y.o. 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      Subjective   Patient examined and seen. Alert and oriented x3, resting comfortably.  Patient denies chest pain, shortness of breath, palpitations, abdominal pain, fever or chills.  Patient is agreeable to go home when cleared.  Reports support system is intact.  He does state that after working with therapy his left shoulder is painful to touch and he has decreased ROM. No drift identified. Stated it started yesterday.     Will get Xray of Left Shoulder. Denies chest pain, shortness of breath, pain that radiates. Tender with palpation and reproducible with movement.        Objective     Last Recorded Vitals  BP (!) 156/93 (BP Location: Right arm, Patient Position: Lying)   Pulse 94   Temp 37.9 °C (100.2 °F) (Temporal)   Resp 18   Wt 75.1 kg (165 lb 9.1 oz)   SpO2 98%   Intake/Output last 3 Shifts:    Intake/Output Summary (Last 24 hours) at 1/22/2024 1520  Last data filed at 1/22/2024 0815  Gross per 24 hour   Intake 3775.33 ml   Output 1210 ml   Net 2565.33 ml       Admission Weight  Weight: 75.1 kg (165 lb 9.1 oz) (01/20/24 0635)    Daily Weight  01/20/24 : 75.1 kg (165 lb 9.1 oz)    Image Results  XR knee 4+ views bilateral  Narrative: Interpreted By:  German Hughes III,   STUDY:  XR KNEE 4+ VIEWS BILATERAL; ;  1/15/2024 2:58 pm      INDICATION:  Signs/Symptoms:pain.      COMPARISON:  None.      ACCESSION NUMBER(S):  [id number]      ORDERING CLINICIAN:  GERMAN HUGHES      FINDINGS:  Bilateral weight-bearing views knees: Severe osteophytosis sclerosis  joint space narrowing the medial compartment as well as about the  patellofemoral space. There is osteophytosis and sclerosis as well as  subchondral cystic change consistent with primary arthritis. No acute  osseous abnormality no fracture or dislocation appreciated      Impression: Severe bilateral knee arthritis           MACRO:  None      Signed by: Florentin Marti III 1/15/2024 5:09 PM  Dictation workstation:   [id number]      Physical Exam  Constitutional: Well developed, awake/alert/oriented x3, cooperative  Respiratory/Thorax: Patent airways,  normal breath sounds  Cardiovascular: Regular, rate and rhythm, no murmurs,   Musculoskeletal: mild decrease range of motion to left knee , area covered with ACE, Left shoulder tenderness with palpitation and reproducible   Extremities: normal extremities,  incision covered with ACE   Skin: warm, dry, intact except as ntoed   Neurological: alert/oriented x 3, speech clear  Psychiatric: appropriate mood and behavior    Relevant Results  Scheduled medications  cefTRIAXone, 1 g, intravenous, q24h  docusate sodium, 100 mg, oral, BID  enoxaparin, 40 mg, subcutaneous, Daily  [Held by provider] ertugliflozin, 15 mg, oral, Daily  famotidine, 20 mg, oral, q12h  insulin lispro, 0-5 Units, subcutaneous, Before meals & nightly  lisinopril, 10 mg, oral, Daily  rosuvastatin, 10 mg, oral, Nightly  [Held by provider] semaglutide, 3 mg, oral, Daily before breakfast  sodium chloride, 10 mL, intravenous, q8h      Continuous medications  lactated Ringer's, 100 mL/hr, Last Rate: Stopped (01/22/24 0815)      PRN medications  PRN medications: acetaminophen, cyclobenzaprine, dextrose 10 % in water (D10W), dextrose, glucagon, hydrALAZINE, morphine, naloxone, ondansetron ODT **OR** ondansetron, oxyCODONE, oxyCODONE    Results for orders placed or performed during the hospital encounter of 01/20/24 (from the past 24 hour(s))   POCT GLUCOSE   Result Value Ref Range    POCT Glucose 52 (L) 74 - 99 mg/dL   POCT GLUCOSE   Result Value Ref Range    POCT Glucose 50 (L) 74 - 99 mg/dL   POCT GLUCOSE   Result Value Ref Range    POCT Glucose 62 (L) 74 - 99 mg/dL   POCT GLUCOSE   Result Value Ref Range    POCT Glucose 70 (L) 74 - 99 mg/dL   POCT GLUCOSE   Result Value Ref Range    POCT Glucose 106 (H) 74 - 99 mg/dL   POCT  GLUCOSE   Result Value Ref Range    POCT Glucose 113 (H) 74 - 99 mg/dL   CBC   Result Value Ref Range    WBC 9.3 4.4 - 11.3 x10*3/uL    nRBC 0.0 0.0 - 0.0 /100 WBCs    RBC 5.03 4.50 - 5.90 x10*6/uL    Hemoglobin 14.2 13.5 - 17.5 g/dL    Hematocrit 41.9 41.0 - 52.0 %    MCV 83 80 - 100 fL    MCH 28.2 26.0 - 34.0 pg    MCHC 33.9 32.0 - 36.0 g/dL    RDW 13.9 11.5 - 14.5 %    Platelets 164 150 - 450 x10*3/uL   Basic metabolic panel   Result Value Ref Range    Glucose 58 (L) 74 - 99 mg/dL    Sodium 136 136 - 145 mmol/L    Potassium 4.1 3.5 - 5.3 mmol/L    Chloride 103 98 - 107 mmol/L    Bicarbonate 24 21 - 32 mmol/L    Anion Gap 13 10 - 20 mmol/L    Urea Nitrogen 22 6 - 23 mg/dL    Creatinine 0.94 0.50 - 1.30 mg/dL    eGFR >90 >60 mL/min/1.73m*2    Calcium 8.6 8.6 - 10.3 mg/dL   POCT GLUCOSE   Result Value Ref Range    POCT Glucose 62 (L) 74 - 99 mg/dL   POCT GLUCOSE   Result Value Ref Range    POCT Glucose 132 (H) 74 - 99 mg/dL   POCT GLUCOSE   Result Value Ref Range    POCT Glucose 165 (H) 74 - 99 mg/dL          Assessment/Plan        # Left Knee Arthritis w/ Septic Arthritis  I&D procedure  Admit to Orthopedics Team   Hospitalist team Consulted   DVTp and Pain management per Ortho   PT/OT evaluation  and treatment   CBC/BMP as appropriate, review results  Pulmonary Toileting   Follow up as needed outpatient with Orthopedics     # Diabetes Mellitus Type 2  Episode of Hypoglycemia - asymptomatic - improved   Diet Cardiac/Diabetic - changed to Regular, poor appetite while inpatient and may be cause of hypoglycemia - acute   Continue Sliding Scale SSI AC/HS   A1C 15 12/2023  Encourage healthy lifestyle changes  We will continue to monitor, may need to adjust medications based on glucose readings    # HTN / HLD  Continue home mediations as appropriate  Denies significant cardiac history  No indication for telemetry reading     #GERD  Continue PPI  Stay upright for 30minutes  Take pain medications with food    # Acute Left  Shoulder Pain   Reproducible / Denies Chest Pain / Does not radiate / Denies tingling or numbness   Tenderness with palpation   Will Get Xray of Shoulder  ECG and STAT trop - doubt ACS cause due to reproducible and tender with palpation, however, no ecg on file in [facility]. Baseline to be obtained     Thank you for consult  We will continue to follow    Hemodynamically stable     Time spent  36 minutes obtaining labs, imaging, recommendations, interview, assessment, examination, medication review/ordering, and EMR review.    Plan of care was discussed extensively with patient, RN and family at bedside. Patient verbalized understanding through teach back method. All questions and concerns addressed upon examination.     Of note, this documentation is completed using the Dragon Dictation system (voice recognition software). There may be spelling and/or grammatical errors that were not corrected prior to final submission.      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

## 2024-01-22 NOTE — PROGRESS NOTES
Patient EKG reviewed.  Normal sinus rhythm with no acute ischemic changes.  Troponins x 2 noted to be at 23 with flat trend.  Patient without any anginal symptoms.  Left shoulder pain musculoskeletal in origin reproducible with range of motion.  Advise utilizing heat as needed, Lidoderm patch as needed and Tylenol as needed.  X-ray of the left upper extremity reviewed showing mild acromioclavicular joint osteoarthritis.  No further recommendations from hospital medicine at this time.  Please call with any significant questions or concerns.

## 2024-01-22 NOTE — TELEPHONE ENCOUNTER
Patient called [facility] today regarding recent hospitalization (still admitted) for streptococcus infection requiring surgical debridement. Patient reports not taking Rybelsus during hospital stay, as they are primarily treating diabetes with insulin. Patient reports readings are low (50s-100s).    Patient states he will likely be discharged within the next few days, and plans to go to a rehab center. Advised patient he can begin taking Rybelsus after discharge, and to call [facility] with any additional questions/concerns.    Thank you,  Jo-Ann Silva, PharmD

## 2024-01-22 NOTE — PROGRESS NOTES
DAILY PROGRESS NOTE      POD 2 irrigation debridement left knee s/p cortisone injection     Patient doing well  Visit Vitals  /78 (BP Location: Left arm, Patient Position: Lying)   Pulse 75   Temp 36 °C (96.8 °F) (Temporal)   Resp 18      Temp (24hrs), Av °C (98.6 °F), Min:36 °C (96.8 °F), Max:37.8 °C (100 °F)       Pain Control good  No chest pain or shortness of breath.  No calf pain    Exam:   Extremity shows neuro vascular status intact. Flexion and extension intact on extremity.  Calves soft and non-tender without evidence of DVT.  There is no swelling, warmth or erythema about the left knee.   DASIA drain with minimal output that will be discontinued this am.         Labs reviewed:  CBC: @LABRCNT(WBC:3,HGB:3,PLT:3)@  BMP:  @LABRCNT(Na:3,K:3,CL:3,CO2:3,BUN:3,Creatinine:3,Glucose:3)@    I&O  I/O last 3 completed shifts:  In: 4115.3 (54.8 mL/kg) [P.O.:820; I.V.:3145.3 (41.9 mL/kg); IV Piggyback:150]  Out: 3015 (40.1 mL/kg) [Urine:2980 (1.1 mL/kg/hr); Drains:35]  Weight: 75.1 kg       Assessment:    Patient pod2 irrigation debridement left knee. Doing well postoperatively. Is not having any significant pain.     Plan:    Continue IV atb per ID. Coordinate discharge antibiotics. May need PICC line for long-term antibiotic coverage at discharge.   PT/OT: WBAT to left lower extremity. Ok for ROM to knee.   Continue pain control   Discharge planning: patient plans to return to home at discharge. SW and TCC following for discharge planning.     Prabhakar Huerta, APRN-CNP   2024 8:24 AM

## 2024-01-22 NOTE — PROGRESS NOTES
Physical Therapy    Physical Therapy Treatment    Patient Name: Melchor Bergeron  MRN: [id number]  Today's Date: 1/22/2024  Time Calculation  Start Time: 0908  Stop Time: 1006  Time Calculation (min): 58 min       Assessment/Plan   PT Assessment  PT Assessment Results: Decreased mobility, Decreased strength, Decreased range of motion, Decreased endurance  Rehab Prognosis: Good  Evaluation/Treatment Tolerance: Patient limited by fatigue, Patient limited by pain  Medical Staff Made Aware: Yes  End of Session Communication: Bedside nurse  Assessment Comment: pt would benefit from continued therapy to improved functional mobilityand safety  End of Session Patient Position: Alarm on, Up in chair     Treatment/Interventions: Transfer training, Gait training, Therapeutic exercise, Bed mobility  PT Plan: Skilled PT  PT Frequency: 5 times per week  PT Discharge Recommendations: High intensity level of continued care  Equipment Recommended upon Discharge:  (pt states that he has a standard walker at home and can borrow a rollator if needed) PT Recommended Transfer Status: Assist x1, Assistive device    General Visit Information:   PT  Visit  PT Received On: 01/22/24  General  Reason for Referral: I&D L knee  Family/Caregiver Present: No  Prior to Session Communication: Bedside nurse  Patient Position Received: Bed, 3 rail up, Alarm on  Preferred Learning Style: auditory, visual, written  General Comment: agreeable to particpate, states he has become very weak due to the infection (needs encouragement for out of bed activty, able to perform with instruction)         Subjective     Precautions:  Precautions  LE Weight Bearing Status: Weight Bearing as Tolerated (L LE)  Medical Precautions: Fall precautions  Post-Surgical Precautions:  (L knee immobilizer on at all times except for hyigene ,NO ROM .)  Precautions Comment: OK for ROM, KI as needed      Pain:  Pain Assessment  Pain Assessment: 0-10  Pain Score: 7 (2/10 at rest)  Pain  Type: Acute pain  Pain Location: Knee  Pain Orientation: Left  Pain Descriptors: Aching  Pain Frequency: Intermittent  Pain Interventions: Cold applied    Cognition:  Cognition  Overall Cognitive Status: Within Functional Limits        Activity Tolerance:  Activity Tolerance  Endurance: Decreased tolerance for upright activites    Treatments:  Therapeutic Exercise  Therapeutic Exercise Performed: Yes  Therapeutic Exercise Activity 1: instructed in and perfomed ther ex including AP, QS, GS, pt states he has been focused mainly on APs while in bed        Bed Mobility  Bed Mobility: Yes  Bed Mobility 1  Bed Mobility Comments 1: transfers supine --> sit with Mod A for trunk, pt ablw to advance LE's with Min A , tolerated sitting EOB with CGA and vc to soot to edge of bed until feet touch ground  Ambulation/Gait Training  Ambulation/Gait Training Performed: Yes  Ambulation/Gait Training 1  Surface 1: Level tile  Device 1: Rolling walker  Comments/Distance (ft) 1: ambulating 12  ft x2 with  ModA progressing to [place] , using chair follow  WW andvc for quad recruitment, glut recruitment , posture , walker placement and sequencing . Pt has difficulty getting B LE's into full extension , performed with KI in place and without KI in place , Pt feels more comfortable with KI in place , continues to require Min A , chair follow and frequent vc with KI in place  Transfers  Transfer: Yes  Transfer 1  Technique 1: Sit to stand, Stand to sit  Trials/Comments 1: transfers sit <--> stand with Mod A and vc for technique,  hand placement and safety, required 2 attempts to perform , performed from bed and chair with arms  Stairs  Stairs: No       Outcome Measures:  [facility] Basic Mobility  Turning from your back to your side while in a flat bed without using bedrails: A lot  Moving from lying on your back to sitting on the side of a flat bed without using bedrails: A lot  Moving to and from bed to chair (including a wheelchair): A  lot  Standing up from a chair using your arms (e.g. wheelchair or bedside chair): A lot  To walk in hospital room: A lot  Climbing 3-5 steps with railing: Total  Basic Mobility - Total Score: 11      EDUCATION:  Outpatient Education  Individual(s) Educated: Patient  Education Provided: Fall Risk, Home Exercise Program, Home Safety  Patient Response to Education: Patient/Caregiver Verbalized Understanding of Information    Encounter Problems       Encounter Problems (Active)       PT Problem       Pt will demonstrate mod I with bed mobility to edge of bed.   (Progressing)       Start:  01/21/24    Expected End:  02/04/24            Pt will demonstrate mod I  with sit to stand/chair transfers with FWW.   (Progressing)       Start:  01/21/24    Expected End:  02/04/24            Pt will ambulate 50 feet with FWW mod I NWB L LE .   (Progressing)       Start:  01/21/24    Expected End:  02/04/24            Pt to demo improved BLE strength by being able to complete supine/seated thera ex 2x20 BLEs with 4 or less rest breaks .   (Progressing)       Start:  01/21/24    Expected End:  02/04/24

## 2024-01-23 ENCOUNTER — APPOINTMENT (OUTPATIENT)
Dept: RADIOLOGY | Facility: HOSPITAL | Age: 64
DRG: 856 | End: 2024-01-23
Payer: COMMERCIAL

## 2024-01-23 LAB
ANION GAP SERPL CALC-SCNC: 16 MMOL/L (ref 10–20)
BACTERIA FLD CULT: ABNORMAL
BUN SERPL-MCNC: 23 MG/DL (ref 6–23)
CALCIUM SERPL-MCNC: 9 MG/DL (ref 8.6–10.3)
CHLORIDE SERPL-SCNC: 99 MMOL/L (ref 98–107)
CO2 SERPL-SCNC: 24 MMOL/L (ref 21–32)
CREAT SERPL-MCNC: 1.15 MG/DL (ref 0.5–1.3)
CRP SERPL-MCNC: 21.23 MG/DL
EGFRCR SERPLBLD CKD-EPI 2021: 72 ML/MIN/1.73M*2
ERYTHROCYTE [DISTWIDTH] IN BLOOD BY AUTOMATED COUNT: 13.9 % (ref 11.5–14.5)
ERYTHROCYTE [SEDIMENTATION RATE] IN BLOOD BY WESTERGREN METHOD: 16 MM/H (ref 0–20)
GLUCOSE BLD MANUAL STRIP-MCNC: 104 MG/DL (ref 74–99)
GLUCOSE BLD MANUAL STRIP-MCNC: 137 MG/DL (ref 74–99)
GLUCOSE BLD MANUAL STRIP-MCNC: 217 MG/DL (ref 74–99)
GLUCOSE BLD MANUAL STRIP-MCNC: 89 MG/DL (ref 74–99)
GLUCOSE SERPL-MCNC: 86 MG/DL (ref 74–99)
GRAM STN SPEC: ABNORMAL
GRAM STN SPEC: ABNORMAL
HCT VFR BLD AUTO: 45.9 % (ref 41–52)
HGB BLD-MCNC: 14.9 G/DL (ref 13.5–17.5)
HOLD SPECIMEN: NORMAL
MCH RBC QN AUTO: 27.7 PG (ref 26–34)
MCHC RBC AUTO-ENTMCNC: 32.5 G/DL (ref 32–36)
MCV RBC AUTO: 85 FL (ref 80–100)
NRBC BLD-RTO: 0 /100 WBCS (ref 0–0)
PLATELET # BLD AUTO: 178 X10*3/UL (ref 150–450)
POTASSIUM SERPL-SCNC: 4 MMOL/L (ref 3.5–5.3)
RBC # BLD AUTO: 5.38 X10*6/UL (ref 4.5–5.9)
SODIUM SERPL-SCNC: 135 MMOL/L (ref 136–145)
WBC # BLD AUTO: 7.3 X10*3/UL (ref 4.4–11.3)

## 2024-01-23 PROCEDURE — 85027 COMPLETE CBC AUTOMATED: CPT | Performed by: NURSE PRACTITIONER

## 2024-01-23 PROCEDURE — 2500000001 HC RX 250 WO HCPCS SELF ADMINISTERED DRUGS (ALT 637 FOR MEDICARE OP)

## 2024-01-23 PROCEDURE — 2500000004 HC RX 250 GENERAL PHARMACY W/ HCPCS (ALT 636 FOR OP/ED): Performed by: NURSE PRACTITIONER

## 2024-01-23 PROCEDURE — 97116 GAIT TRAINING THERAPY: CPT | Mod: GP,CQ

## 2024-01-23 PROCEDURE — 80048 BASIC METABOLIC PNL TOTAL CA: CPT | Performed by: NURSE PRACTITIONER

## 2024-01-23 PROCEDURE — 73223 MRI JOINT UPR EXTR W/O&W/DYE: CPT | Mod: LEFT SIDE | Performed by: RADIOLOGY

## 2024-01-23 PROCEDURE — 97110 THERAPEUTIC EXERCISES: CPT | Mod: GP,CQ

## 2024-01-23 PROCEDURE — 86140 C-REACTIVE PROTEIN: CPT | Performed by: NURSE PRACTITIONER

## 2024-01-23 PROCEDURE — 85652 RBC SED RATE AUTOMATED: CPT | Performed by: NURSE PRACTITIONER

## 2024-01-23 PROCEDURE — 2500000004 HC RX 250 GENERAL PHARMACY W/ HCPCS (ALT 636 FOR OP/ED): Performed by: STUDENT IN AN ORGANIZED HEALTH CARE EDUCATION/TRAINING PROGRAM

## 2024-01-23 PROCEDURE — 1100000001 HC PRIVATE ROOM DAILY

## 2024-01-23 PROCEDURE — 36415 COLL VENOUS BLD VENIPUNCTURE: CPT | Performed by: NURSE PRACTITIONER

## 2024-01-23 PROCEDURE — 99233 SBSQ HOSP IP/OBS HIGH 50: CPT | Performed by: STUDENT IN AN ORGANIZED HEALTH CARE EDUCATION/TRAINING PROGRAM

## 2024-01-23 PROCEDURE — 2500000004 HC RX 250 GENERAL PHARMACY W/ HCPCS (ALT 636 FOR OP/ED)

## 2024-01-23 PROCEDURE — A9575 INJ GADOTERATE MEGLUMI 0.1ML: HCPCS | Performed by: STUDENT IN AN ORGANIZED HEALTH CARE EDUCATION/TRAINING PROGRAM

## 2024-01-23 PROCEDURE — 82947 ASSAY GLUCOSE BLOOD QUANT: CPT

## 2024-01-23 PROCEDURE — 99232 SBSQ HOSP IP/OBS MODERATE 35: CPT | Performed by: REGISTERED NURSE

## 2024-01-23 PROCEDURE — 2550000001 HC RX 255 CONTRASTS: Performed by: STUDENT IN AN ORGANIZED HEALTH CARE EDUCATION/TRAINING PROGRAM

## 2024-01-23 PROCEDURE — 2500000001 HC RX 250 WO HCPCS SELF ADMINISTERED DRUGS (ALT 637 FOR MEDICARE OP): Performed by: NURSE PRACTITIONER

## 2024-01-23 PROCEDURE — 73223 MRI JOINT UPR EXTR W/O&W/DYE: CPT | Mod: LT

## 2024-01-23 PROCEDURE — 2500000004 HC RX 250 GENERAL PHARMACY W/ HCPCS (ALT 636 FOR OP/ED): Performed by: INTERNAL MEDICINE

## 2024-01-23 RX ORDER — GADOTERATE MEGLUMINE 376.9 MG/ML
0.2 INJECTION INTRAVENOUS
Status: COMPLETED | OUTPATIENT
Start: 2024-01-23 | End: 2024-01-23

## 2024-01-23 RX ADMIN — DOCUSATE SODIUM 100 MG: 100 CAPSULE, LIQUID FILLED ORAL at 07:52

## 2024-01-23 RX ADMIN — ENOXAPARIN SODIUM 40 MG: 40 INJECTION SUBCUTANEOUS at 07:52

## 2024-01-23 RX ADMIN — CEFTRIAXONE SODIUM 1 G: 1 INJECTION, SOLUTION INTRAVENOUS at 20:46

## 2024-01-23 RX ADMIN — SODIUM CHLORIDE 10 ML: 9 INJECTION, SOLUTION INTRAVENOUS at 08:00

## 2024-01-23 RX ADMIN — FAMOTIDINE 20 MG: 20 TABLET, FILM COATED ORAL at 11:25

## 2024-01-23 RX ADMIN — FAMOTIDINE 20 MG: 20 TABLET, FILM COATED ORAL at 00:31

## 2024-01-23 RX ADMIN — INSULIN LISPRO 2 UNITS: 100 INJECTION, SOLUTION INTRAVENOUS; SUBCUTANEOUS at 20:47

## 2024-01-23 RX ADMIN — GADOTERATE MEGLUMINE 15 ML: 376.9 INJECTION INTRAVENOUS at 13:10

## 2024-01-23 RX ADMIN — ROSUVASTATIN CALCIUM 10 MG: 10 TABLET, FILM COATED ORAL at 20:47

## 2024-01-23 RX ADMIN — SODIUM CHLORIDE, SODIUM LACTATE, POTASSIUM CHLORIDE, AND CALCIUM CHLORIDE 100 ML/HR: 600; 310; 30; 20 INJECTION, SOLUTION INTRAVENOUS at 23:35

## 2024-01-23 RX ADMIN — LISINOPRIL 10 MG: 10 TABLET ORAL at 07:52

## 2024-01-23 RX ADMIN — CYCLOBENZAPRINE HYDROCHLORIDE 10 MG: 10 TABLET, FILM COATED ORAL at 23:35

## 2024-01-23 RX ADMIN — CYCLOBENZAPRINE HYDROCHLORIDE 10 MG: 10 TABLET, FILM COATED ORAL at 02:08

## 2024-01-23 RX ADMIN — OXYCODONE HYDROCHLORIDE 5 MG: 5 TABLET ORAL at 11:24

## 2024-01-23 ASSESSMENT — COGNITIVE AND FUNCTIONAL STATUS - GENERAL
STANDING UP FROM CHAIR USING ARMS: A LITTLE
WALKING IN HOSPITAL ROOM: A LITTLE
MOBILITY SCORE: 14
CLIMB 3 TO 5 STEPS WITH RAILING: TOTAL
DAILY ACTIVITIY SCORE: 22
MOVING TO AND FROM BED TO CHAIR: A LITTLE
TOILETING: A LITTLE
TOILETING: A LITTLE
DRESSING REGULAR LOWER BODY CLOTHING: A LITTLE
TURNING FROM BACK TO SIDE WHILE IN FLAT BAD: A LOT
MOVING TO AND FROM BED TO CHAIR: A LITTLE
CLIMB 3 TO 5 STEPS WITH RAILING: TOTAL
TURNING FROM BACK TO SIDE WHILE IN FLAT BAD: A LITTLE
MOVING FROM LYING ON BACK TO SITTING ON SIDE OF FLAT BED WITH BEDRAILS: A LOT
STANDING UP FROM CHAIR USING ARMS: A LITTLE
MOBILITY SCORE: 16
DAILY ACTIVITIY SCORE: 22
MOVING FROM LYING ON BACK TO SITTING ON SIDE OF FLAT BED WITH BEDRAILS: A LITTLE
DRESSING REGULAR LOWER BODY CLOTHING: A LITTLE
WALKING IN HOSPITAL ROOM: A LITTLE

## 2024-01-23 ASSESSMENT — PAIN DESCRIPTION - DESCRIPTORS: DESCRIPTORS: ACHING;THROBBING

## 2024-01-23 ASSESSMENT — PAIN DESCRIPTION - ORIENTATION: ORIENTATION: LEFT

## 2024-01-23 ASSESSMENT — PAIN DESCRIPTION - LOCATION: LOCATION: KNEE

## 2024-01-23 ASSESSMENT — PAIN SCALES - GENERAL
PAINLEVEL_OUTOF10: 7
PAINLEVEL_OUTOF10: 4
PAINLEVEL_OUTOF10: 0 - NO PAIN
PAINLEVEL_OUTOF10: 3

## 2024-01-23 ASSESSMENT — PAIN - FUNCTIONAL ASSESSMENT
PAIN_FUNCTIONAL_ASSESSMENT: 0-10
PAIN_FUNCTIONAL_ASSESSMENT: 0-10

## 2024-01-23 NOTE — PROGRESS NOTES
Melchor Bergeron is a 63 y.o. male on day 3 of admission presenting with Arthritis due to other bacteria, left knee (CMS/HCC).      Subjective   Patient examined and seen. Alert and oriented x3, resting comfortably in chair surrounded by family. Patient denies chest pain, shortness of breath, palpitations, abdominal pain, fever or chills.  He states his left anterior shoulder area remains tender with palpation and the pain is limiting him from moving his arm freely. Ortho evaluated for MRI today.     Denies radiating pain to jaw, back, abdomen or chest.           Objective     Last Recorded Vitals  /88 (BP Location: Right arm, Patient Position: Lying)   Pulse 86   Temp 37 °C (98.6 °F) (Temporal)   Resp 18   Wt 75.1 kg (165 lb 9.1 oz)   SpO2 96%   Intake/Output last 3 Shifts:    Intake/Output Summary (Last 24 hours) at 1/23/2024 1224  Last data filed at 1/23/2024 0900  Gross per 24 hour   Intake 910 ml   Output 1600 ml   Net -690 ml       Admission Weight  Weight: 75.1 kg (165 lb 9.1 oz) (01/20/24 0635)    Daily Weight  01/20/24 : 75.1 kg (165 lb 9.1 oz)    Image Results  XR shoulder left 2+ views  Narrative: Interpreted By:  Cali Smalls,   STUDY:  XR SHOULDER LEFT 2+ VIEWS; ;  1/22/2024 4:42 pm      INDICATION:  Signs/Symptoms:Left shoulder tenderness with palation and  reproducible with movement.      COMPARISON:  None.      ACCESSION NUMBER(S):  [id number]      ORDERING CLINICIAN:  JALEN ABRAHAM      FINDINGS:  No acute fracture or dislocation. No significant soft tissue  swelling. Mild acromioclavicular joint osteoarthritis.      Impression: No acute osseous abnormality.      Signed by: Cali Smalls 1/22/2024 5:01 PM  Dictation workstation:   [id number]  ECG 12 lead  Sinus tachycardia  Inferior infarct , age undetermined  Abnormal ECG  No previous ECGs available      Physical Exam  Constitutional: Well developed, awake/alert/oriented x3, cooperative  Respiratory/Thorax: Patent airways,   normal breath sounds  Cardiovascular: Regular, rate and rhythm, no murmurs,   Musculoskeletal: mild decrease range of motion to left knee , area covered with ACE, Left shoulder tenderness with palpitation and reproducible, ROM decreased, unchanged from yesterday   Extremities: normal extremities,  incision covered with ACE   Skin: warm, dry, intact except as ntoed   Neurological: alert/oriented x 3, speech clear, push pulls lower extremities bilaterally  5/5, hand grasps bilaterally 5/5, no drift noted in either extremity, patient is unable to raise left lower extremity as high as right due to increased pain to the patella. Patella noted to be significantly swollen, warm, tender with palpation   Psychiatric: appropriate mood and behavior      Relevant Results  Scheduled medications  cefTRIAXone, 1 g, intravenous, q24h  docusate sodium, 100 mg, oral, BID  enoxaparin, 40 mg, subcutaneous, Daily  [Held by provider] ertugliflozin, 15 mg, oral, Daily  famotidine, 20 mg, oral, q12h  insulin lispro, 0-5 Units, subcutaneous, Before meals & nightly  lisinopril, 10 mg, oral, Daily  rosuvastatin, 10 mg, oral, Nightly  [Held by provider] semaglutide, 3 mg, oral, Daily before breakfast  sodium chloride, 10 mL, intravenous, q8h      Continuous medications  lactated Ringer's, 100 mL/hr, Last Rate: Stopped (01/22/24 0815)      PRN medications  PRN medications: acetaminophen, cyclobenzaprine, dextrose 10 % in water (D10W), dextrose, glucagon, hydrALAZINE, lidocaine, morphine, naloxone, ondansetron ODT **OR** ondansetron, oxyCODONE, oxyCODONE    Results for orders placed or performed during the hospital encounter of 01/20/24 (from the past 24 hour(s))   ECG 12 lead   Result Value Ref Range    Ventricular Rate 105 BPM    Atrial Rate 105 BPM    OH Interval 138 ms    QRS Duration 86 ms    QT Interval 352 ms    QTC Calculation(Bazett) 465 ms    P Axis 60 degrees    R Axis 10 degrees    T Axis 65 degrees    QRS Count 17 beats    Q Onset  227 ms    P Onset 158 ms    P Offset 217 ms    T Offset 403 ms    QTC Fredericia 424 ms   Troponin I, High Sensitivity   Result Value Ref Range    Troponin I, High Sensitivity 23 (H) 0 - 20 ng/L   POCT GLUCOSE   Result Value Ref Range    POCT Glucose 157 (H) 74 - 99 mg/dL   Light Blue Top   Result Value Ref Range    Extra Tube Hold for add-ons.    Lavender Top   Result Value Ref Range    Extra Tube Hold for add-ons.    SST TOP   Result Value Ref Range    Extra Tube Hold for add-ons.    Troponin I, High Sensitivity   Result Value Ref Range    Troponin I, High Sensitivity 23 (H) 0 - 20 ng/L   POCT GLUCOSE   Result Value Ref Range    POCT Glucose 194 (H) 74 - 99 mg/dL   CBC   Result Value Ref Range    WBC 7.3 4.4 - 11.3 x10*3/uL    nRBC 0.0 0.0 - 0.0 /100 WBCs    RBC 5.38 4.50 - 5.90 x10*6/uL    Hemoglobin 14.9 13.5 - 17.5 g/dL    Hematocrit 45.9 41.0 - 52.0 %    MCV 85 80 - 100 fL    MCH 27.7 26.0 - 34.0 pg    MCHC 32.5 32.0 - 36.0 g/dL    RDW 13.9 11.5 - 14.5 %    Platelets 178 150 - 450 x10*3/uL   Basic metabolic panel   Result Value Ref Range    Glucose 86 74 - 99 mg/dL    Sodium 135 (L) 136 - 145 mmol/L    Potassium 4.0 3.5 - 5.3 mmol/L    Chloride 99 98 - 107 mmol/L    Bicarbonate 24 21 - 32 mmol/L    Anion Gap 16 10 - 20 mmol/L    Urea Nitrogen 23 6 - 23 mg/dL    Creatinine 1.15 0.50 - 1.30 mg/dL    eGFR 72 >60 mL/min/1.73m*2    Calcium 9.0 8.6 - 10.3 mg/dL   SST TOP   Result Value Ref Range    Extra Tube Hold for add-ons.    POCT GLUCOSE   Result Value Ref Range    POCT Glucose 89 74 - 99 mg/dL   Sedimentation rate, automated   Result Value Ref Range    Sedimentation Rate 16 0 - 20 mm/h   C-reactive protein   Result Value Ref Range    C-Reactive Protein 21.23 (H) <1.00 mg/dL   POCT GLUCOSE   Result Value Ref Range    POCT Glucose 104 (H) 74 - 99 mg/dL     XR shoulder left 2+ views    Result Date: 1/22/2024  Interpreted By:  Cali Smalls, STUDY: XR SHOULDER LEFT 2+ VIEWS; ;  1/22/2024 4:42 pm   INDICATION:  Signs/Symptoms:Left shoulder tenderness with palation and reproducible with movement.   COMPARISON: None.   ACCESSION NUMBER(S): [id number]   ORDERING CLINICIAN: JALEN ABRAHAM   FINDINGS: No acute fracture or dislocation. No significant soft tissue swelling. Mild acromioclavicular joint osteoarthritis.       No acute osseous abnormality.   Signed by: Cali Smalls 1/22/2024 5:01 PM Dictation workstation:   [id number]    ECG 12 lead    Result Date: 1/22/2024  Sinus tachycardia Inferior infarct , age undetermined Abnormal ECG No previous ECGs available    XR knee 4+ views bilateral    Result Date: 1/15/2024  Interpreted By:  German Marti III, STUDY: XR KNEE 4+ VIEWS BILATERAL; ;  1/15/2024 2:58 pm   INDICATION: Signs/Symptoms:pain.   COMPARISON: None.   ACCESSION NUMBER(S): [id number]   ORDERING CLINICIAN: GERMAN MARTI   FINDINGS: Bilateral weight-bearing views knees: Severe osteophytosis sclerosis joint space narrowing the medial compartment as well as about the patellofemoral space. There is osteophytosis and sclerosis as well as subchondral cystic change consistent with primary arthritis. No acute osseous abnormality no fracture or dislocation appreciated       Severe bilateral knee arthritis     MACRO: None   Signed by: German Marti III 1/15/2024 5:09 PM Dictation workstation:   [id number]     Assessment/Plan        # Left Knee Arthritis w/ Septic Arthritis  I&D procedure  Admit to Orthopedics Team   Hospitalist team Consulted   DVTp and Pain management per Ortho   PT/OT evaluation  and treatment   CBC/BMP as appropriate, review results  Pulmonary Toileting   Follow up as needed outpatient with Orthopedics      # Diabetes Mellitus Type 2  Episode of Hypoglycemia - asymptomatic - improved  and stable   Diet Cardiac/Diabetic - changed to Regular, poor appetite while inpatient and may be cause of hypoglycemia - acute   Continue Sliding Scale SSI AC/HS   A1C 15 12/2023  Encourage healthy lifestyle  changes  We will continue to monitor, may need to adjust medications based on glucose readings     # HTN / HLD  Continue home mediations as appropriate  Denies significant cardiac history  No indication for telemetry reading      #GERD  Continue PPI  Stay upright for 30minutes  Take pain medications with food     # Acute Left Shoulder Pain   Reproducible / Denies Chest Pain / Does not radiate / Denies tingling or numbness   Tenderness with palpation   Will Get Xray of Shoulder - deferred to Orthopedics for further examination -no acute findings    MRI pending   ECG and STAT trop - doubt ACS cause due to reproducible and tender with palpation, however, no ecg on file in [facility]. Baseline to be obtained - ECG reviewed  Trops flat at 23     Thank you for consult  We will continue to follow for Diabetes Management peripherally - ok per Ortho NP    Discussed findings with Ortho NP. Defer tenderness to left shoulder to Orthopedics Team      Hemodynamically stable      Time spent  36 minutes obtaining labs, imaging, recommendations, interview, assessment, examination, medication review/ordering, and EMR review.     Plan of care was discussed extensively with patient, RN and family at bedside. Patient verbalized understanding through teach back method. All questions and concerns addressed upon examination.      Of note, this documentation is completed using the Dragon Dictation system (voice recognition software). There may be spelling and/or grammatical errors that were not corrected prior to final submission.    Principal Problem:    Arthritis due to other bacteria, left knee (CMS/[facility])  Active Problems:    Arthritis, septic (CMS/[facility])        Marilu Aviles, APRN-CNP

## 2024-01-23 NOTE — CARE PLAN
Problem: Fall/Injury  Goal: Verbalize understanding of risk factor reduction measures to prevent injury from fall in the home  Outcome: Progressing  Goal: Pace activities to prevent fatigue by end of the shift  Outcome: Progressing     Problem: Pain  Goal: Takes deep breaths with improved pain control throughout the shift  Outcome: Progressing  Goal: Turns in bed with improved pain control throughout the shift  Outcome: Progressing  Goal: Walks with improved pain control throughout the shift  Outcome: Progressing  Goal: Performs ADL's with improved pain control throughout shift  Outcome: Progressing  Goal: Participates in PT with improved pain control throughout the shift  Outcome: Progressing  Goal: Free from opioid side effects throughout the shift  Outcome: Progressing  Goal: Free from acute confusion related to pain meds throughout the shift  Outcome: Progressing

## 2024-01-23 NOTE — PROGRESS NOTES
DAILY PROGRESS NOTE      POD3 irrigation debridement left knee    Patient doing fairly well  Visit Vitals  /88 (BP Location: Right arm, Patient Position: Lying)   Pulse 86   Temp 37 °C (98.6 °F) (Temporal)   Resp 18      Temp (24hrs), Av.7 °C (99.8 °F), Min:37 °C (98.6 °F), Max:38.5 °C (101.3 °F)       Pain Control fair  No chest pain or shortness of breath.  No calf pain    Exam:   Extremity shows neuro vascular status intact. Flexion and extension intact on extremity.  Calves soft and non-tender without evidence of DVT.  Patient continues to have complaints of left knee pain with decreased range of motion and ability to bear weight.  Patient is also complaining of left shoulder/SC joint pain and tenderness.  Pain is reproducible in this area with palpation.  Will obtain MRI to rule out SC joint infection.        Labs reviewed:     CBC:   Lab Results   Component Value Date    WBC 7.3 2024    WBC 9.3 2024    WBC 11.4 (H) 2024    HGB 14.9 2024    HGB 14.2 2024    HGB 14.2 2024       BMP:    Lab Results   Component Value Date     (L) 2024     2024     2024    K 4.0 2024    K 4.1 2024    K 4.1 2024    CL 99 2024     2024     2024    CO2 24 2024    CO2 24 2024    CO2 23 2024    BUN 23 2024    BUN 22 2024    BUN 32 (H) 2024    CREATININE 1.15 2024    CREATININE 0.94 2024    CREATININE 1.21 2024          I&O  I/O last 3 completed shifts:  In: 4675.3 (62.3 mL/kg) [P.O.:1040; I.V.:3385.3 (45.1 mL/kg); IV Piggyback:250]  Out: 2810 (37.4 mL/kg) [Urine:2800 (1 mL/kg/hr); Drains:10]  Weight: 75.1 kg       Assessment:    Patient postop day 3 rogation debridement of left knee for septic left knee status post cortisone injection.  He continues to have significant pain to the left knee worsened by range of motion and weightbearing.  He is also now  complaining of left shoulder pain and SC joint pain.  This pain is reproduced with palpation.  He did have fever yesterday.  With this and his continued pain concern for possible SC joint infection.  MRI ordered to rule this out.  WBC count this morning 7.3, CRP is elevated at 21, sed rate normal at 16.  He continues on IV antibiotics per infectious disease.  He is getting IV Rocephin 1 g every 24 hours.  Blood cultures are negative.  Surgical culture shows 2+ Streptococcus mitis/oralis group  Fine-needle aspiration shows 4+ abundant Streptococcus mitis/oralis group susceptible to IV Rocephin.       Plan:    PT OT: Okay to weight-bear as tolerated with range of motion to left knee  Continue pain control  Stat MRI left shoulder and SC joint to rule out SC joint infection  Possible aspiration of left knee later today for further evaluation of continued infection  Discharge planning: Patient plans to discharge to acute rehab at discharge.  Social work and TCC following.    SOLEDAD Dupree-AUTUMN MOCK  1/23/2024 10:54 AM    Patient seen and evaluated today.  Agree with nurse practitioners evaluation.  I had a discussion with Melchor as well as family today.  He continues to have some short arc pain at the knee despite arthroscopic I&D and drain placement.  He is currently on IV antibiotics per our infectious disease team.  Given continued pain about the knee we will obtain an MRI stat to ensure no other fluid collections.  Will add on for surgical intervention repeat arthroscopic I&D 1/24/2024 of the knee.  Regarding MRI results of the shoulder no appreciable abscess identified.  There was a pleural effusion therefore we will consult pulmonology for evaluation of this.  Patient does have exquisite tenderness to palpation about the SC joint there is no large effusion appreciated on the MRI of this, does have some signal enhancement within the costochondral joint will continue with IV antibiotics for treatment of this.   Appreciate infectious diseases input as well as from the pulmonology team.  Some edema noted shoulder region no identifiable fluid collection.  Patient's labs are downtrending which does suggest improvement however response to treatment has been slower in nature therefore we will continue to monitor closely.

## 2024-01-23 NOTE — PROGRESS NOTES
Occupational Therapy                 Therapy Communication Note    Patient Name: Melchor Bergeron  MRN: [id number]  Today's Date: 1/23/2024     Discipline: Occupational Therapy    Missed Visit Reason: Missed Visit Reason:  (pt off the floor for MRI of L shoulder, will reattempt as schedule allows)    Missed Time: Attempt 1246    Comment:

## 2024-01-23 NOTE — CARE PLAN
The patient's goals for the shift include      The clinical goals for the shift include Patient will report tolerable pain levels by end of shift    Over the shift, the patient did not make progress toward the following goals. Barriers to progression include . Recommendations to address these barriers include .

## 2024-01-23 NOTE — PROGRESS NOTES
Physical Therapy    Physical Therapy Treatment    Patient Name: Melchor Bergeron  MRN: [id number]  Today's Date: 1/23/2024  Time Calculation  Start Time: 1120  Stop Time: 1211  Time Calculation (min): 51 min       Assessment/Plan   PT Assessment  PT Assessment Results: Decreased mobility, Decreased strength, Decreased range of motion, Decreased endurance, Decreased safety awareness  Rehab Prognosis: Good  Evaluation/Treatment Tolerance: Patient limited by fatigue, Patient limited by pain  Medical Staff Made Aware: Yes  End of Session Communication: Bedside nurse  Assessment Comment: pt would benefit from continued therapy to improved functional mobilityand safety  End of Session Patient Position: Alarm on, Up in chair     Treatment/Interventions: Transfer training, Gait training, Endurance training, Therapeutic exercise, Therapeutic activity  PT Plan: Skilled PT  PT Frequency: 5 times per week  PT Discharge Recommendations: High intensity level of continued care  Equipment Recommended upon Discharge:  (pt states that he has a standard walker at home and can borrow a rollator if needed) PT Recommended Transfer Status: Assist x1, Assistive device    General Visit Information:   PT  Visit  PT Received On: 01/23/24  General  Reason for Referral: I&D L knee  Family/Caregiver Present: Yes  Caregiver Feedback: pt's significant other and multiple family memebers present and supportive  Prior to Session Communication: Bedside nurse (cleared by nursing to participate)  Patient Position Received: Alarm on, Up in chair  Preferred Learning Style: auditory, visual, written  General Comment: agreeable to particpate, states he is going to have MRI and possibly another procedure (clarified with nurse practioner, pt to have MRI of shoulder and may have L adwoa aspiration if needed)    General Observations:   General Observation: seated in recline chair with LE's elevated upon arrival        Precautions:  Precautions  LE Weight Bearing  Status: Weight Bearing as Tolerated (L LE)  Medical Precautions: Fall precautions  Post-Surgical Precautions:  (L knee immobilizer on at all times except for hyigene ,NO ROM .)  Precautions Comment: OK for ROM, KI as needed    Vital Signs:  Vital Signs  Heart Rate: (!) 112  SpO2: 94 % (on RA)  Patient Position: Sitting  Objective     Pain:  Pain Assessment  Pain Assessment: 0-10  Pain Score: 7 (with WB, 2/10 at rest before and after treatment)  Pain Type: Acute pain  Pain Location: Knee  Pain Orientation: Left  Pain Descriptors: Aching, Throbbing  Pain Frequency: Intermittent  Pain Interventions: Cold applied    Cognition:  Cognition  Overall Cognitive Status: Within Functional Limits            Activity Tolerance:  Activity Tolerance  Endurance: Decreased tolerance for upright activites    Treatments:  Therapeutic Exercise  Therapeutic Exercise Performed: Yes  Therapeutic Exercise Activity 1: instructed in and perfomed ther ex including AP, QS, GS, SQ, LAQ, hip flexion , HS, abd/add and SLRs 5 to 10reps        Bed Mobility  Bed Mobility: Yes  Bed Mobility 1  Bed Mobility Comments 1: transfers supine --> sit with Mod A for trunk, pt ablw to advance LE's with Min A , tolerated sitting EOB with CGA and vc to soot to edge of bed until feet touch ground  Ambulation/Gait Training  Ambulation/Gait Training Performed: Yes  Ambulation/Gait Training 1  Surface 1: Level tile  Device 1: Rolling walker  Comments/Distance (ft) 1: ambulating 10ft x 2 with WW and Bairon , vc for sequencing , posture, walker placement and to increase WB through L LE (pt reluctant to WB through L LE secondary to pain, WB throught UEs to avoid WB through L LE)  Transfers  Transfer: Yes  Transfer 1  Technique 1: Sit to stand, Stand to sit  Trials/Comments 1: transfers sit <--> stand  with WW and Min A ,  vc for technique..pt having difficulty scoooting to edge of chair , able to perform after multiple attempts  Stairs  Stairs: No       Outcome  Measures:  [facility] Basic Mobility  Turning from your back to your side while in a flat bed without using bedrails: A lot  Moving from lying on your back to sitting on the side of a flat bed without using bedrails: A lot  Moving to and from bed to chair (including a wheelchair): A little  Standing up from a chair using your arms (e.g. wheelchair or bedside chair): A little  To walk in hospital room: A little  Climbing 3-5 steps with railing: Total  Basic Mobility - Total Score: 14      EDUCATION:  Individual(s) Educated: Patient, Significant Other, Other  Education Provided: Fall Risk, Home Exercise Program, Home Safety  Patient Response to Education: Patient/Caregiver Verbalized Understanding of Information, Patient/Caregiver Asked Appropriate Questions, Patient/Caregiver Performed Return Demonstration of Exercises/Activities    Encounter Problems       Encounter Problems (Active)       PT Problem       Pt will demonstrate mod I with bed mobility to edge of bed.   (Progressing)       Start:  01/21/24    Expected End:  02/04/24            Pt will demonstrate mod I  with sit to stand/chair transfers with FWW.   (Progressing)       Start:  01/21/24    Expected End:  02/04/24            Pt will ambulate 50 feet with FWW mod I NWB L LE .   (Progressing)       Start:  01/21/24    Expected End:  02/04/24            Pt to demo improved BLE strength by being able to complete supine/seated thera ex 2x20 BLEs with 4 or less rest breaks .   (Progressing)       Start:  01/21/24    Expected End:  02/04/24

## 2024-01-24 ENCOUNTER — PREP FOR PROCEDURE (OUTPATIENT)
Dept: ORTHOPEDICS | Facility: HOSPITAL | Age: 64
End: 2024-01-24

## 2024-01-24 ENCOUNTER — ANESTHESIA (OUTPATIENT)
Dept: OPERATING ROOM | Facility: HOSPITAL | Age: 64
DRG: 856 | End: 2024-01-24
Payer: COMMERCIAL

## 2024-01-24 ENCOUNTER — APPOINTMENT (OUTPATIENT)
Dept: RADIOLOGY | Facility: HOSPITAL | Age: 64
DRG: 856 | End: 2024-01-24
Payer: COMMERCIAL

## 2024-01-24 ENCOUNTER — ANESTHESIA EVENT (OUTPATIENT)
Dept: OPERATING ROOM | Facility: HOSPITAL | Age: 64
DRG: 856 | End: 2024-01-24
Payer: COMMERCIAL

## 2024-01-24 LAB
ANION GAP SERPL CALC-SCNC: 15 MMOL/L (ref 10–20)
ANION GAP SERPL CALC-SCNC: 16 MMOL/L (ref 10–20)
ATRIAL RATE: 105 BPM
BACTERIA BLD CULT: NORMAL
BACTERIA BLD CULT: NORMAL
BUN SERPL-MCNC: 27 MG/DL (ref 6–23)
BUN SERPL-MCNC: 27 MG/DL (ref 6–23)
CALCIUM SERPL-MCNC: 8.2 MG/DL (ref 8.6–10.3)
CALCIUM SERPL-MCNC: 8.7 MG/DL (ref 8.6–10.3)
CHLORIDE SERPL-SCNC: 98 MMOL/L (ref 98–107)
CHLORIDE SERPL-SCNC: 99 MMOL/L (ref 98–107)
CO2 SERPL-SCNC: 24 MMOL/L (ref 21–32)
CO2 SERPL-SCNC: 25 MMOL/L (ref 21–32)
CREAT SERPL-MCNC: 1.23 MG/DL (ref 0.5–1.3)
CREAT SERPL-MCNC: 1.28 MG/DL (ref 0.5–1.3)
EGFRCR SERPLBLD CKD-EPI 2021: 63 ML/MIN/1.73M*2
EGFRCR SERPLBLD CKD-EPI 2021: 66 ML/MIN/1.73M*2
ERYTHROCYTE [DISTWIDTH] IN BLOOD BY AUTOMATED COUNT: 13.8 % (ref 11.5–14.5)
GLUCOSE BLD MANUAL STRIP-MCNC: 111 MG/DL (ref 74–99)
GLUCOSE BLD MANUAL STRIP-MCNC: 79 MG/DL (ref 74–99)
GLUCOSE BLD MANUAL STRIP-MCNC: 83 MG/DL (ref 74–99)
GLUCOSE SERPL-MCNC: 104 MG/DL (ref 74–99)
GLUCOSE SERPL-MCNC: 108 MG/DL (ref 74–99)
HCT VFR BLD AUTO: 42.7 % (ref 41–52)
HGB BLD-MCNC: 14.1 G/DL (ref 13.5–17.5)
HOLD SPECIMEN: NORMAL
MCH RBC QN AUTO: 27.5 PG (ref 26–34)
MCHC RBC AUTO-ENTMCNC: 33 G/DL (ref 32–36)
MCV RBC AUTO: 83 FL (ref 80–100)
NRBC BLD-RTO: 0 /100 WBCS (ref 0–0)
P AXIS: 60 DEGREES
P OFFSET: 217 MS
P ONSET: 158 MS
PLATELET # BLD AUTO: 183 X10*3/UL (ref 150–450)
POTASSIUM SERPL-SCNC: 4.3 MMOL/L (ref 3.5–5.3)
POTASSIUM SERPL-SCNC: 4.3 MMOL/L (ref 3.5–5.3)
PR INTERVAL: 138 MS
Q ONSET: 227 MS
QRS COUNT: 17 BEATS
QRS DURATION: 86 MS
QT INTERVAL: 352 MS
QTC CALCULATION(BAZETT): 465 MS
QTC FREDERICIA: 424 MS
R AXIS: 10 DEGREES
RBC # BLD AUTO: 5.12 X10*6/UL (ref 4.5–5.9)
SODIUM SERPL-SCNC: 134 MMOL/L (ref 136–145)
SODIUM SERPL-SCNC: 135 MMOL/L (ref 136–145)
T AXIS: 65 DEGREES
T OFFSET: 403 MS
VENTRICULAR RATE: 105 BPM
WBC # BLD AUTO: 6.4 X10*3/UL (ref 4.4–11.3)

## 2024-01-24 PROCEDURE — 2500000004 HC RX 250 GENERAL PHARMACY W/ HCPCS (ALT 636 FOR OP/ED)

## 2024-01-24 PROCEDURE — 3600000004 HC OR TIME - INITIAL BASE CHARGE - PROCEDURE LEVEL FOUR: Performed by: ORTHOPAEDIC SURGERY

## 2024-01-24 PROCEDURE — 73721 MRI JNT OF LWR EXTRE W/O DYE: CPT | Mod: LT

## 2024-01-24 PROCEDURE — 2500000004 HC RX 250 GENERAL PHARMACY W/ HCPCS (ALT 636 FOR OP/ED): Performed by: ORTHOPAEDIC SURGERY

## 2024-01-24 PROCEDURE — 1100000001 HC PRIVATE ROOM DAILY

## 2024-01-24 PROCEDURE — 36415 COLL VENOUS BLD VENIPUNCTURE: CPT | Performed by: REGISTERED NURSE

## 2024-01-24 PROCEDURE — 7100000002 HC RECOVERY ROOM TIME - EACH INCREMENTAL 1 MINUTE: Performed by: ORTHOPAEDIC SURGERY

## 2024-01-24 PROCEDURE — 80048 BASIC METABOLIC PNL TOTAL CA: CPT | Performed by: NURSE PRACTITIONER

## 2024-01-24 PROCEDURE — 2500000001 HC RX 250 WO HCPCS SELF ADMINISTERED DRUGS (ALT 637 FOR MEDICARE OP): Performed by: NURSE PRACTITIONER

## 2024-01-24 PROCEDURE — 2720000007 HC OR 272 NO HCPCS: Performed by: ORTHOPAEDIC SURGERY

## 2024-01-24 PROCEDURE — 85027 COMPLETE CBC AUTOMATED: CPT | Performed by: NURSE PRACTITIONER

## 2024-01-24 PROCEDURE — 7100000001 HC RECOVERY ROOM TIME - INITIAL BASE CHARGE: Performed by: ORTHOPAEDIC SURGERY

## 2024-01-24 PROCEDURE — 36415 COLL VENOUS BLD VENIPUNCTURE: CPT | Performed by: NURSE PRACTITIONER

## 2024-01-24 PROCEDURE — 2500000004 HC RX 250 GENERAL PHARMACY W/ HCPCS (ALT 636 FOR OP/ED): Performed by: INTERNAL MEDICINE

## 2024-01-24 PROCEDURE — 2500000001 HC RX 250 WO HCPCS SELF ADMINISTERED DRUGS (ALT 637 FOR MEDICARE OP)

## 2024-01-24 PROCEDURE — 0SBD4ZZ EXCISION OF LEFT KNEE JOINT, PERCUTANEOUS ENDOSCOPIC APPROACH: ICD-10-PCS | Performed by: ORTHOPAEDIC SURGERY

## 2024-01-24 PROCEDURE — 3600000009 HC OR TIME - EACH INCREMENTAL 1 MINUTE - PROCEDURE LEVEL FOUR: Performed by: ORTHOPAEDIC SURGERY

## 2024-01-24 PROCEDURE — 73721 MRI JNT OF LWR EXTRE W/O DYE: CPT | Mod: LEFT SIDE | Performed by: RADIOLOGY

## 2024-01-24 PROCEDURE — 3700000001 HC GENERAL ANESTHESIA TIME - INITIAL BASE CHARGE: Performed by: ORTHOPAEDIC SURGERY

## 2024-01-24 PROCEDURE — 87040 BLOOD CULTURE FOR BACTERIA: CPT | Mod: ELYLAB | Performed by: REGISTERED NURSE

## 2024-01-24 PROCEDURE — 3700000002 HC GENERAL ANESTHESIA TIME - EACH INCREMENTAL 1 MINUTE: Performed by: ORTHOPAEDIC SURGERY

## 2024-01-24 PROCEDURE — 29871 ARTHRS KNEE SURG FOR INFCTJ: CPT | Performed by: STUDENT IN AN ORGANIZED HEALTH CARE EDUCATION/TRAINING PROGRAM

## 2024-01-24 PROCEDURE — 2500000004 HC RX 250 GENERAL PHARMACY W/ HCPCS (ALT 636 FOR OP/ED): Performed by: NURSE PRACTITIONER

## 2024-01-24 PROCEDURE — 82947 ASSAY GLUCOSE BLOOD QUANT: CPT

## 2024-01-24 PROCEDURE — 29871 ARTHRS KNEE SURG FOR INFCTJ: CPT | Performed by: ORTHOPAEDIC SURGERY

## 2024-01-24 PROCEDURE — 2500000004 HC RX 250 GENERAL PHARMACY W/ HCPCS (ALT 636 FOR OP/ED): Performed by: ANESTHESIOLOGY

## 2024-01-24 PROCEDURE — A4217 STERILE WATER/SALINE, 500 ML: HCPCS | Performed by: ORTHOPAEDIC SURGERY

## 2024-01-24 RX ORDER — METOCLOPRAMIDE HYDROCHLORIDE 5 MG/ML
INJECTION INTRAMUSCULAR; INTRAVENOUS AS NEEDED
Status: DISCONTINUED | OUTPATIENT
Start: 2024-01-24 | End: 2024-01-24

## 2024-01-24 RX ORDER — PROPOFOL 10 MG/ML
INJECTION, EMULSION INTRAVENOUS AS NEEDED
Status: DISCONTINUED | OUTPATIENT
Start: 2024-01-24 | End: 2024-01-24

## 2024-01-24 RX ORDER — FENTANYL CITRATE 50 UG/ML
INJECTION, SOLUTION INTRAMUSCULAR; INTRAVENOUS AS NEEDED
Status: DISCONTINUED | OUTPATIENT
Start: 2024-01-24 | End: 2024-01-24

## 2024-01-24 RX ORDER — KETOROLAC TROMETHAMINE 30 MG/ML
INJECTION, SOLUTION INTRAMUSCULAR; INTRAVENOUS AS NEEDED
Status: DISCONTINUED | OUTPATIENT
Start: 2024-01-24 | End: 2024-01-24

## 2024-01-24 RX ORDER — LABETALOL HYDROCHLORIDE 5 MG/ML
5 INJECTION, SOLUTION INTRAVENOUS ONCE AS NEEDED
Status: CANCELLED | OUTPATIENT
Start: 2024-01-24

## 2024-01-24 RX ORDER — MEPERIDINE HYDROCHLORIDE 25 MG/ML
12.5 INJECTION INTRAMUSCULAR; INTRAVENOUS; SUBCUTANEOUS EVERY 10 MIN PRN
Status: CANCELLED | OUTPATIENT
Start: 2024-01-24

## 2024-01-24 RX ORDER — DROPERIDOL 2.5 MG/ML
0.62 INJECTION, SOLUTION INTRAMUSCULAR; INTRAVENOUS ONCE AS NEEDED
Status: CANCELLED | OUTPATIENT
Start: 2024-01-24

## 2024-01-24 RX ORDER — MIDAZOLAM HYDROCHLORIDE 1 MG/ML
INJECTION, SOLUTION INTRAMUSCULAR; INTRAVENOUS AS NEEDED
Status: DISCONTINUED | OUTPATIENT
Start: 2024-01-24 | End: 2024-01-24

## 2024-01-24 RX ORDER — ALBUTEROL SULFATE 0.83 MG/ML
2.5 SOLUTION RESPIRATORY (INHALATION) ONCE
Status: CANCELLED | OUTPATIENT
Start: 2024-01-24 | End: 2024-01-24

## 2024-01-24 RX ORDER — MIDAZOLAM HYDROCHLORIDE 1 MG/ML
1 INJECTION, SOLUTION INTRAMUSCULAR; INTRAVENOUS ONCE AS NEEDED
Status: CANCELLED | OUTPATIENT
Start: 2024-01-24

## 2024-01-24 RX ORDER — OXYCODONE HYDROCHLORIDE 5 MG/1
5 TABLET ORAL EVERY 4 HOURS PRN
Status: CANCELLED | OUTPATIENT
Start: 2024-01-24

## 2024-01-24 RX ORDER — SODIUM CHLORIDE, SODIUM LACTATE, POTASSIUM CHLORIDE, CALCIUM CHLORIDE 600; 310; 30; 20 MG/100ML; MG/100ML; MG/100ML; MG/100ML
100 INJECTION, SOLUTION INTRAVENOUS CONTINUOUS
Status: CANCELLED | OUTPATIENT
Start: 2024-01-24

## 2024-01-24 RX ORDER — SODIUM CHLORIDE 0.9 G/100ML
IRRIGANT IRRIGATION AS NEEDED
Status: DISCONTINUED | OUTPATIENT
Start: 2024-01-24 | End: 2024-01-24 | Stop reason: HOSPADM

## 2024-01-24 RX ADMIN — OXYCODONE HYDROCHLORIDE 10 MG: 5 TABLET ORAL at 18:05

## 2024-01-24 RX ADMIN — PROPOFOL 200 MG: 10 INJECTION, EMULSION INTRAVENOUS at 17:01

## 2024-01-24 RX ADMIN — CEFTRIAXONE SODIUM 1 G: 1 INJECTION, SOLUTION INTRAVENOUS at 20:25

## 2024-01-24 RX ADMIN — ACETAMINOPHEN 650 MG: 325 TABLET ORAL at 18:05

## 2024-01-24 RX ADMIN — METOCLOPRAMIDE 10 MG: 5 INJECTION, SOLUTION INTRAMUSCULAR; INTRAVENOUS at 17:17

## 2024-01-24 RX ADMIN — KETOROLAC TROMETHAMINE 15 MG: 30 INJECTION, SOLUTION INTRAMUSCULAR at 17:29

## 2024-01-24 RX ADMIN — FENTANYL CITRATE 50 MCG: 50 INJECTION, SOLUTION INTRAMUSCULAR; INTRAVENOUS at 17:31

## 2024-01-24 RX ADMIN — FENTANYL CITRATE 100 MCG: 50 INJECTION, SOLUTION INTRAMUSCULAR; INTRAVENOUS at 17:09

## 2024-01-24 RX ADMIN — FENTANYL CITRATE 50 MCG: 50 INJECTION, SOLUTION INTRAMUSCULAR; INTRAVENOUS at 17:17

## 2024-01-24 RX ADMIN — MIDAZOLAM 2 MG: 1 INJECTION INTRAMUSCULAR; INTRAVENOUS at 16:57

## 2024-01-24 RX ADMIN — DOCUSATE SODIUM 100 MG: 100 CAPSULE, LIQUID FILLED ORAL at 09:00

## 2024-01-24 RX ADMIN — LISINOPRIL 10 MG: 10 TABLET ORAL at 09:00

## 2024-01-24 RX ADMIN — FAMOTIDINE 20 MG: 20 TABLET, FILM COATED ORAL at 09:00

## 2024-01-24 RX ADMIN — OXYCODONE HYDROCHLORIDE 10 MG: 5 TABLET ORAL at 05:08

## 2024-01-24 RX ADMIN — ROSUVASTATIN CALCIUM 10 MG: 10 TABLET, FILM COATED ORAL at 20:26

## 2024-01-24 RX ADMIN — ONDANSETRON 4 MG: 2 INJECTION, SOLUTION INTRAMUSCULAR; INTRAVENOUS at 17:17

## 2024-01-24 SDOH — HEALTH STABILITY: MENTAL HEALTH: CURRENT SMOKER: 0

## 2024-01-24 ASSESSMENT — COGNITIVE AND FUNCTIONAL STATUS - GENERAL
STANDING UP FROM CHAIR USING ARMS: A LOT
DAILY ACTIVITIY SCORE: 21
MOVING TO AND FROM BED TO CHAIR: A LITTLE
MOBILITY SCORE: 17
MOVING FROM LYING ON BACK TO SITTING ON SIDE OF FLAT BED WITH BEDRAILS: A LOT
WALKING IN HOSPITAL ROOM: A LOT
TURNING FROM BACK TO SIDE WHILE IN FLAT BAD: A LOT
DRESSING REGULAR LOWER BODY CLOTHING: A LITTLE
DRESSING REGULAR LOWER BODY CLOTHING: A LITTLE
STANDING UP FROM CHAIR USING ARMS: A LOT
DAILY ACTIVITIY SCORE: 21
MOVING TO AND FROM BED TO CHAIR: A LOT
MOBILITY SCORE: 11
CLIMB 3 TO 5 STEPS WITH RAILING: TOTAL
TOILETING: A LITTLE
HELP NEEDED FOR BATHING: A LITTLE
TOILETING: A LITTLE
TOILETING: A LITTLE
CLIMB 3 TO 5 STEPS WITH RAILING: A LOT
CLIMB 3 TO 5 STEPS WITH RAILING: TOTAL
MOVING FROM LYING ON BACK TO SITTING ON SIDE OF FLAT BED WITH BEDRAILS: A LOT
MOVING FROM LYING ON BACK TO SITTING ON SIDE OF FLAT BED WITH BEDRAILS: A LITTLE
DRESSING REGULAR UPPER BODY CLOTHING: A LITTLE
MOVING TO AND FROM BED TO CHAIR: A LOT
TURNING FROM BACK TO SIDE WHILE IN FLAT BAD: A LITTLE
HELP NEEDED FOR BATHING: A LITTLE
DAILY ACTIVITIY SCORE: 19
WALKING IN HOSPITAL ROOM: A LITTLE
PERSONAL GROOMING: A LITTLE
HELP NEEDED FOR BATHING: A LITTLE
WALKING IN HOSPITAL ROOM: A LOT
DRESSING REGULAR LOWER BODY CLOTHING: A LITTLE
STANDING UP FROM CHAIR USING ARMS: A LITTLE

## 2024-01-24 ASSESSMENT — PAIN SCALES - GENERAL
PAINLEVEL_OUTOF10: 3
PAINLEVEL_OUTOF10: 7
PAINLEVEL_OUTOF10: 3
PAINLEVEL_OUTOF10: 0 - NO PAIN
PAINLEVEL_OUTOF10: 7
PAINLEVEL_OUTOF10: 5 - MODERATE PAIN
PAINLEVEL_OUTOF10: 3
PAINLEVEL_OUTOF10: 0 - NO PAIN
PAIN_LEVEL: 0

## 2024-01-24 ASSESSMENT — PAIN - FUNCTIONAL ASSESSMENT
PAIN_FUNCTIONAL_ASSESSMENT: 0-10

## 2024-01-24 NOTE — OP NOTE
Arthroscopy Knee (L) Operative Note     Date: 2024 - 2024  OR Location: [facility]    Name: Melchor Bergeron : 1960, Age: 63 y.o., MRN: [id number], Sex: male    Diagnosis  Pre-op Diagnosis     * Arthritis, septic (CMS/HCC) [M00.9] Post-op Diagnosis     * Arthritis, septic (CMS/HCC) [M00.9]     Procedures  Arthroscopy Knee  08300 - [place] ARTHROSCOPY KNEE INFECTION LAVAGE & DRAINAGE      Surgeons      * Melchor Martinez - Primary    Resident/Fellow/Other Assistant:  Surgeon(s) and Role:     * Arielle Miranda PA-C - Assisting    Procedure Summary  Anesthesia: General  ASA: II  Anesthesia Staff: Anesthesiologist: Dhaval Mcdermott DO; Escobar Muñoz MD  Estimated Blood Loss: Minimal mL  Intra-op Medications: Administrations occurring from 1315 to 1415 on 24:  * No intraprocedure medications in log *           Anesthesia Record               Intraprocedure I/O Totals          Intake    lactated Ringer's infusion 1000.00 mL    Total Intake 1000 mL          Specimen: No specimens collected     Staff:   Circulator: Debi Azul RN  Relief Scrub: Joana Cobb  Scrub Person: Seema Colon         Drains and/or Catheters: * None in log *    Tourniquet Times:         Indications: 61-year-old male with notable history of infections of the left knee elected proceed with surgery for arthroscopic lavage/irrigation debridement of septic the left knee    Risks benefits and alternatives to surgery were discussed including but not limited to Infection, bleeding, neurovascular injury, pain and dysfunction, hardware related complications including cutout failure breakage, loss of function, motion, and permanent disability as well as the cardiovascular and pulmonary complications from anesthesia including death and DVT. Patient and family accept these risks.  We discussed specifically wound healing complication including dehiscence, infection, failure, deep infection occluding osteomyelitis, stiffness loss in function motion and  potential need for future revision surgeries    Patient identified in the preop area.  Left lower extremity marked and confirmed with patient as the operative site.  Brought back to the operating room and anesthetized under general anesthesia.  Operative lower extremity was then prepped and draped in standard sterile fashion.  Patient, site and procedure were confirmed with timeout.  Everyone in the room agreed.  Preop antibiotics were on a scheduled basis.  Patient had a prior arthroscopy portals.  Sutures were removed and the scope was introduced.    We then made standard medial and lateral arthroscopy portal and the scope was introduced.  Patient had no obvious purulence noted however did have extensive mount of hematoma.  There is fibrinous debris and hematoma throughout the medial lateral compartments as well as the suprapatellar pouch.  He is a combination shaver as well as electrocautery to remove all of the underlying hematoma and to a bleeding healthy surface with.  We rinsed the knee with Clorpactin solution under arthroscopic lavage.  We then did an additional 3 L of normal saline.  We had a thorough removal of any hematoma as well as down to the synovial reflections and medial lateral gutter and within the notch.  Patient moderate arthritic change and some cartilage loss noted.  Wounds were irrigated with normal saline.  Portals were left open and packed with quarter inch iodoform gauze.  Sterile dressings were applied.  Patient placed in knee immobilizer to the PACU stable condition.    Arielle Warner PA-C was present throughout the entire case. Given the nature of the disease process and the procedure, a skilled surgical first assistant was necessary during the case. The assistant was necessary to hold retractors and to manipulate the extremity during the procedure. A certified scrub tech was at the back table managing the instruments and supplies for the surgical case.  Melchor Martinez  Phone Number:  [phone]

## 2024-01-24 NOTE — CARE PLAN
The patient's goals for the shift include      The clinical goals for the shift include pain control    Over the shift, the patient did  make progress toward the following goals. Barriers to progression include none. Recommendations to address these barriers include none.

## 2024-01-24 NOTE — PROGRESS NOTES
Patient continues to complain of left knee pain.  Stat MRI ordered  Remains n.p.o. for arthroscopic I&D of left knee.  Continue IV antibiotics per infectious disease  Pulmonary consult pending

## 2024-01-24 NOTE — ANESTHESIA POSTPROCEDURE EVALUATION
Patient: Melchor Bergeron    Procedure Summary       Date: 01/24/24 Room / Location: [facility] OR 04 / [facility]    Anesthesia Start: 1657 Anesthesia Stop: 1747    Procedure: Arthroscopy Knee (Left: Knee) Diagnosis:       Arthritis, septic (CMS/HCC)      (Arthritis, septic (CMS/[facility]) [M00.9])    Surgeons: Melchor Martinez MD Responsible Provider: Escobar Muñoz MD    Anesthesia Type: regional, MAC, spinal ASA Status: 2            Anesthesia Type: regional, MAC, spinal    Vitals Value Taken Time   /68 01/24/24 1747   Temp 38.2 01/24/24 1747   Pulse 117 01/24/24 1747   Resp 20 01/24/24 1747   SpO2 100 01/24/24 1747       Anesthesia Post Evaluation    Patient location during evaluation: bedside  Patient participation: complete - patient participated  Level of consciousness: awake and alert  Pain score: 0  Pain management: adequate  Airway patency: patent  Cardiovascular status: acceptable  Respiratory status: acceptable  Hydration status: acceptable  Postoperative Nausea and Vomiting: none        There were no known notable events for this encounter.

## 2024-01-24 NOTE — ANESTHESIA PROCEDURE NOTES
Airway  Date/Time: 1/24/2024 5:02 PM  Urgency: elective      Staffing  Performed: attending   Authorized by: Escobar Muñoz MD    Performed by: Escobar Muñoz MD  Patient location during procedure: OR    Indications and Patient Condition  Indications for airway management: anesthesia  Spontaneous ventilation: present  Sedation level: deep  Preoxygenated: yes  MILS not maintained throughout  Mask difficulty assessment: 0 - not attempted  Planned trial extubation    Final Airway Details  Final airway type: supraglottic airway      Successful airway: classic  Size 5     Number of attempts at approach: 1  Number of other approaches attempted: 1

## 2024-01-24 NOTE — ANESTHESIA PREPROCEDURE EVALUATION
Patient: Melchor Bergeron    Procedure Information       Date/Time: 01/24/24 1315    Procedure: Arthroscopy Knee (Left: Knee)    Location: [facility] / [facility]    Surgeons: Melchor Martinez MD            Relevant Problems   Anesthesia (within normal limits)      Cardiovascular   (+) Hyperlipidemia   (+) Hypertension      Endocrine   (+) Type 2 diabetes mellitus with microalbuminuria, without long-term current use of insulin (CMS/HCC)      GI   (+) Gastroesophageal reflux disease      /Renal (within normal limits)      Neuro/Psych (within normal limits)      Pulmonary (within normal limits)      GI/Hepatic (within normal limits)      Hematology (within normal limits)      Musculoskeletal   (+) Primary osteoarthritis of left knee      Eyes, Ears, Nose, and Throat (within normal limits)      Infectious Disease   (+) Arthritis due to other bacteria, left knee (CMS/HCC)   (+) Arthritis, septic (CMS/HCC)      Other   (+) Arthritis due to other bacteria, left knee (CMS/HCC)   (+) Arthritis, septic (CMS/HCC)       Clinical information reviewed:    Allergies  Meds               NPO Detail:  No data recorded     Physical Exam    Airway  Mallampati: II     Cardiovascular - normal exam  Rhythm: regular     Dental - normal exam     Pulmonary - normal exam     Abdominal - normal exam             Anesthesia Plan    History of general anesthesia?: yes  History of complications of general anesthesia?: no    ASA 2     general     The patient is not a current smoker.  Patient was not previously instructed to abstain from smoking on day of procedure.  Patient did not smoke on day of procedure.    intravenous induction   Anesthetic plan and risks discussed with patient.

## 2024-01-24 NOTE — PROGRESS NOTES
?  HISTORY OF PRESENT ILLNESS:      Presented left knee pain and swelling failing outpatient managementm status post cortisone injection 4 days prior developed severe pain about the knee         Cell count from right knee showed 129,000 white cells on 1/19/2023  Cultures from that fluid showing Streptococcus mitis oralis group     No crystals were seen in knee    Developed some left chest wall into shoulder pain yesterday    No fevers    Current Medications:    Current Facility-Administered Medications   Medication Dose Route Frequency Provider Last Rate Last Admin    acetaminophen (Tylenol) tablet 650 mg  650 mg oral q4h PRN DEANN Raymundo        cefTRIAXone (Rocephin) IVPB 1 g  1 g intravenous q24h Prosper Jeff MD   Stopped at 01/23/24 2116    cyclobenzaprine (Flexeril) tablet 10 mg  10 mg oral TID PRN DEANN Raymundo   10 mg at 01/23/24 2335    dextrose 10 % in water (D10W) infusion  0.3 g/kg/hr intravenous Once PRN DEANN Alex        dextrose 50 % injection 25 g  25 g intravenous q15 min PRN DEANN Alex   25 g at 01/22/24 0638    docusate sodium (Colace) capsule 100 mg  100 mg oral BID DEANN Raymundo   100 mg at 01/24/24 0900    [Held by provider] enoxaparin (Lovenox) syringe 40 mg  40 mg subcutaneous Daily DEANN Raymundo   40 mg at 01/23/24 0752    [Held by provider] ertugliflozin (Steglatro) tablet 15 mg  15 mg oral Daily DEANN Alex        famotidine (Pepcid) tablet 20 mg  20 mg oral q12h DEANN Alex   20 mg at 01/24/24 0900    glucagon (Glucagen) injection 1 mg  1 mg intramuscular q15 min PRN DEANN Alex        hydrALAZINE (Apresoline) injection 10 mg  10 mg intravenous q8h PRN DEANN Alex        insulin lispro (HumaLOG) injection 0-5 Units  0-5 Units subcutaneous Before meals & nightly DEANN Alex   2 Units at 01/23/24 2047    lactated Ringer's infusion  " 100 mL/hr intravenous Continuous DEANN Raymundo 100 mL/hr at 01/23/24 2335 100 mL/hr at 01/23/24 2335    lidocaine 4 % patch 1 patch  1 patch transdermal Daily PRN DEANN Alex   1 patch at 01/22/24 1859    lisinopril tablet 10 mg  10 mg oral Daily DEANN Alex   10 mg at 01/24/24 0900    morphine injection 2 mg  2 mg intravenous q4h PRN DEANN Raymundo        naloxone (Narcan) injection 0.2 mg  0.2 mg intravenous q5 min PRN DEANN Raymundo        ondansetron ODT (Zofran-ODT) disintegrating tablet 4 mg  4 mg oral q8h PRN DEANN Raymundo        Or    ondansetron (Zofran) injection 4 mg  4 mg intravenous q8h PRN DEANN Raymundo   4 mg at 01/20/24 1121    oxyCODONE (Roxicodone) immediate release tablet 10 mg  10 mg oral q4h PRN DEANN Raymundo   10 mg at 01/24/24 0508    oxyCODONE (Roxicodone) immediate release tablet 5 mg  5 mg oral q4h PRN DEANN Raymundo   5 mg at 01/23/24 1124    rosuvastatin (Crestor) tablet 10 mg  10 mg oral Nightly DEANN Alex   10 mg at 01/23/24 2047    [Held by provider] semaglutide (Rybelsus) tablet 3 mg  3 mg oral Daily before breakfast DEANN Alex            Allergies:    Allergies   Allergen Reactions    Sulfa (Sulfonamide Antibiotics) Hives and Rash    Metformin Diarrhea          Review of Systems  14 system review is negative other than HPI     BP (!) 167/97 (BP Location: Left arm, Patient Position: Lying)   Pulse 97   Temp 36.9 °C (98.4 °F) (Temporal)   Resp 16   Ht 1.753 m (5' 9\")   Wt 75.1 kg (165 lb 9.1 oz)   SpO2 94%   BMI 24.45 kg/m²    Physical Exam:  General: Patient appears ok at the present time. NAD  Skin: no new rashes  Left upper lateralk chest wall tenderness  HEENT:  Neck is supple, No subconjunctival hemorrhages, no oral exudates  Heart: S1 S2  Lungs: diminished bases  Abdomen: soft, ND, NTTP,  Extrem: kneee swelling, "            DATA:    .  Lab Results   Component Value Date    WBC 6.4 01/24/2024    HGB 14.1 01/24/2024    HCT 42.7 01/24/2024    MCV 83 01/24/2024     01/24/2024     Results from last 7 days   Lab Units 01/24/24  0711   SODIUM mmol/L 134*  135*   POTASSIUM mmol/L 4.3  4.3   CHLORIDE mmol/L 98  99   CO2 mmol/L 24  25   BUN mg/dL 27*  27*   CREATININE mg/dL 1.28  1.23   CALCIUM mg/dL 8.2*  8.7   GLUCOSE mg/dL 108*  104*   Susceptibility data from last 90 days.  Collected Specimen Info Organism Ceftriaxone Penicillin Tetracycline Vancomycin   01/20/24 Fluid from KNEE FINE NEEDLE ASPIRATION LEFT Streptococcus mitis/oralis group       01/19/24 Fluid from Synovial Streptococcus mitis/oralis group S S S S   Susceptibility data from last 90 days.  Collected Specimen Info Organism Ceftriaxone Penicillin Tetracycline Vancomycin   01/20/24 Fluid from KNEE FINE NEEDLE ASPIRATION LEFT Streptococcus mitis/oralis group       01/19/24 Fluid from Synovial Streptococcus mitis/oralis group S S S S           IMPRESSION:        Problem List Items Addressed This Visit          Infectious Diseases    Arthritis, septic (CMS/[facility]) - Primary    Relevant Orders    Case Request Operating Room: Arthroscopy Knee (Completed)     Other Visit Diagnoses       Infection and inflammatory reaction due to vascular device, implant, and graft, initial encounter (CMS/[facility])        Relevant Orders    Sterile Fluid Culture/Smear (Completed)           PLAN:    Await surgical plan  Ceftriaxone  Agree imaging chest and shoulder    Benji Chavarria MD

## 2024-01-25 ENCOUNTER — APPOINTMENT (OUTPATIENT)
Dept: CARDIOLOGY | Facility: HOSPITAL | Age: 64
DRG: 856 | End: 2024-01-25
Payer: COMMERCIAL

## 2024-01-25 LAB
ANION GAP SERPL CALC-SCNC: 17 MMOL/L (ref 10–20)
AORTIC VALVE MEAN GRADIENT: 4 MMHG
AORTIC VALVE PEAK VELOCITY: 1.3 M/S
APPEARANCE UR: CLEAR
AV PEAK GRADIENT: 6.8 MMHG
AVA (PEAK VEL): 2.71 CM2
AVA (VTI): 2.25 CM2
BILIRUB UR STRIP.AUTO-MCNC: NEGATIVE MG/DL
BUN SERPL-MCNC: 29 MG/DL (ref 6–23)
CALCIUM SERPL-MCNC: 8.4 MG/DL (ref 8.6–10.3)
CHLORIDE SERPL-SCNC: 100 MMOL/L (ref 98–107)
CO2 SERPL-SCNC: 20 MMOL/L (ref 21–32)
COLOR UR: ABNORMAL
CREAT SERPL-MCNC: 1.18 MG/DL (ref 0.5–1.3)
EGFRCR SERPLBLD CKD-EPI 2021: 70 ML/MIN/1.73M*2
EJECTION FRACTION APICAL 4 CHAMBER: 66.7
EJECTION FRACTION: 66 %
ERYTHROCYTE [DISTWIDTH] IN BLOOD BY AUTOMATED COUNT: 13.7 % (ref 11.5–14.5)
GLUCOSE BLD MANUAL STRIP-MCNC: 122 MG/DL (ref 74–99)
GLUCOSE BLD MANUAL STRIP-MCNC: 126 MG/DL (ref 74–99)
GLUCOSE BLD MANUAL STRIP-MCNC: 149 MG/DL (ref 74–99)
GLUCOSE BLD MANUAL STRIP-MCNC: 156 MG/DL (ref 74–99)
GLUCOSE SERPL-MCNC: 105 MG/DL (ref 74–99)
GLUCOSE UR STRIP.AUTO-MCNC: ABNORMAL MG/DL
HCT VFR BLD AUTO: 42.5 % (ref 41–52)
HGB BLD-MCNC: 14.1 G/DL (ref 13.5–17.5)
HIV 1+2 AB+HIV1 P24 AG SERPL QL IA: NONREACTIVE
KETONES UR STRIP.AUTO-MCNC: ABNORMAL MG/DL
LACTATE SERPL-SCNC: 0.8 MMOL/L (ref 0.4–2)
LEFT ATRIUM VOLUME AREA LENGTH INDEX BSA: 20.7 ML/M2
LEFT VENTRICLE INTERNAL DIMENSION DIASTOLE: 3.68 CM (ref 3.5–6)
LEFT VENTRICULAR OUTFLOW TRACT DIAMETER: 2 CM
LEUKOCYTE ESTERASE UR QL STRIP.AUTO: NEGATIVE
MCH RBC QN AUTO: 27.9 PG (ref 26–34)
MCHC RBC AUTO-ENTMCNC: 33.2 G/DL (ref 32–36)
MCV RBC AUTO: 84 FL (ref 80–100)
MITRAL VALVE E/A RATIO: 0.82
MITRAL VALVE E/E' RATIO: 7.2
MUCOUS THREADS #/AREA URNS AUTO: NORMAL /LPF
NITRITE UR QL STRIP.AUTO: NEGATIVE
NRBC BLD-RTO: 0 /100 WBCS (ref 0–0)
PH UR STRIP.AUTO: 5 [PH]
PLATELET # BLD AUTO: 191 X10*3/UL (ref 150–450)
POTASSIUM SERPL-SCNC: 4.5 MMOL/L (ref 3.5–5.3)
PROT UR STRIP.AUTO-MCNC: NEGATIVE MG/DL
RBC # BLD AUTO: 5.06 X10*6/UL (ref 4.5–5.9)
RBC # UR STRIP.AUTO: ABNORMAL /UL
RBC #/AREA URNS AUTO: NORMAL /HPF
RIGHT VENTRICLE FREE WALL PEAK S': 22.8 CM/S
RIGHT VENTRICLE PEAK SYSTOLIC PRESSURE: 28.8 MMHG
SODIUM SERPL-SCNC: 132 MMOL/L (ref 136–145)
SP GR UR STRIP.AUTO: 1.01
TRICUSPID ANNULAR PLANE SYSTOLIC EXCURSION: 2.9 CM
UROBILINOGEN UR STRIP.AUTO-MCNC: <2 MG/DL
VANCOMYCIN SERPL-MCNC: 9 UG/ML (ref 5–20)
WBC # BLD AUTO: 8.2 X10*3/UL (ref 4.4–11.3)
WBC #/AREA URNS AUTO: NORMAL /HPF

## 2024-01-25 PROCEDURE — 2500000004 HC RX 250 GENERAL PHARMACY W/ HCPCS (ALT 636 FOR OP/ED): Performed by: STUDENT IN AN ORGANIZED HEALTH CARE EDUCATION/TRAINING PROGRAM

## 2024-01-25 PROCEDURE — 80048 BASIC METABOLIC PNL TOTAL CA: CPT | Performed by: STUDENT IN AN ORGANIZED HEALTH CARE EDUCATION/TRAINING PROGRAM

## 2024-01-25 PROCEDURE — 2500000004 HC RX 250 GENERAL PHARMACY W/ HCPCS (ALT 636 FOR OP/ED): Performed by: NURSE PRACTITIONER

## 2024-01-25 PROCEDURE — 2500000001 HC RX 250 WO HCPCS SELF ADMINISTERED DRUGS (ALT 637 FOR MEDICARE OP)

## 2024-01-25 PROCEDURE — 81003 URINALYSIS AUTO W/O SCOPE: CPT | Performed by: STUDENT IN AN ORGANIZED HEALTH CARE EDUCATION/TRAINING PROGRAM

## 2024-01-25 PROCEDURE — 87081 CULTURE SCREEN ONLY: CPT | Mod: ELYLAB | Performed by: STUDENT IN AN ORGANIZED HEALTH CARE EDUCATION/TRAINING PROGRAM

## 2024-01-25 PROCEDURE — A4217 STERILE WATER/SALINE, 500 ML: HCPCS

## 2024-01-25 PROCEDURE — 36415 COLL VENOUS BLD VENIPUNCTURE: CPT | Performed by: STUDENT IN AN ORGANIZED HEALTH CARE EDUCATION/TRAINING PROGRAM

## 2024-01-25 PROCEDURE — 2500000005 HC RX 250 GENERAL PHARMACY W/O HCPCS

## 2024-01-25 PROCEDURE — 97535 SELF CARE MNGMENT TRAINING: CPT | Mod: GO,CO

## 2024-01-25 PROCEDURE — 93005 ELECTROCARDIOGRAM TRACING: CPT

## 2024-01-25 PROCEDURE — 36415 COLL VENOUS BLD VENIPUNCTURE: CPT | Performed by: REGISTERED NURSE

## 2024-01-25 PROCEDURE — 93010 ELECTROCARDIOGRAM REPORT: CPT | Performed by: INTERNAL MEDICINE

## 2024-01-25 PROCEDURE — 2500000004 HC RX 250 GENERAL PHARMACY W/ HCPCS (ALT 636 FOR OP/ED)

## 2024-01-25 PROCEDURE — 2500000004 HC RX 250 GENERAL PHARMACY W/ HCPCS (ALT 636 FOR OP/ED): Performed by: INTERNAL MEDICINE

## 2024-01-25 PROCEDURE — 85027 COMPLETE CBC AUTOMATED: CPT | Performed by: STUDENT IN AN ORGANIZED HEALTH CARE EDUCATION/TRAINING PROGRAM

## 2024-01-25 PROCEDURE — 93306 TTE W/DOPPLER COMPLETE: CPT

## 2024-01-25 PROCEDURE — 1200000002 HC GENERAL ROOM WITH TELEMETRY DAILY

## 2024-01-25 PROCEDURE — 87040 BLOOD CULTURE FOR BACTERIA: CPT | Mod: ELYLAB | Performed by: STUDENT IN AN ORGANIZED HEALTH CARE EDUCATION/TRAINING PROGRAM

## 2024-01-25 PROCEDURE — 87389 HIV-1 AG W/HIV-1&-2 AB AG IA: CPT | Mod: ELYLAB | Performed by: INTERNAL MEDICINE

## 2024-01-25 PROCEDURE — 80202 ASSAY OF VANCOMYCIN: CPT

## 2024-01-25 PROCEDURE — 83605 ASSAY OF LACTIC ACID: CPT | Performed by: REGISTERED NURSE

## 2024-01-25 PROCEDURE — 82947 ASSAY GLUCOSE BLOOD QUANT: CPT

## 2024-01-25 PROCEDURE — 2500000004 HC RX 250 GENERAL PHARMACY W/ HCPCS (ALT 636 FOR OP/ED): Performed by: REGISTERED NURSE

## 2024-01-25 PROCEDURE — 97530 THERAPEUTIC ACTIVITIES: CPT | Mod: GP,CQ

## 2024-01-25 PROCEDURE — 93306 TTE W/DOPPLER COMPLETE: CPT | Performed by: INTERNAL MEDICINE

## 2024-01-25 PROCEDURE — 2500000001 HC RX 250 WO HCPCS SELF ADMINISTERED DRUGS (ALT 637 FOR MEDICARE OP): Performed by: NURSE PRACTITIONER

## 2024-01-25 PROCEDURE — 99233 SBSQ HOSP IP/OBS HIGH 50: CPT | Performed by: REGISTERED NURSE

## 2024-01-25 RX ORDER — VANCOMYCIN HYDROCHLORIDE 750 MG/150ML
750 INJECTION, SOLUTION INTRAVENOUS EVERY 12 HOURS
Status: DISCONTINUED | OUTPATIENT
Start: 2024-01-25 | End: 2024-01-29

## 2024-01-25 RX ORDER — SODIUM CHLORIDE 9 MG/ML
100 INJECTION, SOLUTION INTRAVENOUS CONTINUOUS
Status: DISCONTINUED | OUTPATIENT
Start: 2024-01-25 | End: 2024-01-26

## 2024-01-25 RX ADMIN — CYCLOBENZAPRINE HYDROCHLORIDE 10 MG: 10 TABLET, FILM COATED ORAL at 07:40

## 2024-01-25 RX ADMIN — CEFEPIME 1 G: 1 INJECTION, POWDER, FOR SOLUTION INTRAMUSCULAR; INTRAVENOUS at 14:49

## 2024-01-25 RX ADMIN — CEFEPIME 1 G: 1 INJECTION, POWDER, FOR SOLUTION INTRAMUSCULAR; INTRAVENOUS at 21:38

## 2024-01-25 RX ADMIN — MORPHINE SULFATE 2 MG: 2 INJECTION, SOLUTION INTRAMUSCULAR; INTRAVENOUS at 08:42

## 2024-01-25 RX ADMIN — SODIUM CHLORIDE 100 ML/HR: 9 INJECTION, SOLUTION INTRAVENOUS at 09:18

## 2024-01-25 RX ADMIN — ACETAMINOPHEN 650 MG: 325 TABLET ORAL at 13:23

## 2024-01-25 RX ADMIN — PERFLUTREN 1.5 ML OF DILUTION: 6.52 INJECTION, SUSPENSION INTRAVENOUS at 08:25

## 2024-01-25 RX ADMIN — ROSUVASTATIN CALCIUM 10 MG: 10 TABLET, FILM COATED ORAL at 20:09

## 2024-01-25 RX ADMIN — VANCOMYCIN HYDROCHLORIDE 750 MG: 750 INJECTION, SOLUTION INTRAVENOUS at 16:08

## 2024-01-25 RX ADMIN — HYDRALAZINE HYDROCHLORIDE 10 MG: 20 INJECTION INTRAMUSCULAR; INTRAVENOUS at 20:10

## 2024-01-25 RX ADMIN — ACETAMINOPHEN 650 MG: 325 TABLET ORAL at 07:40

## 2024-01-25 RX ADMIN — OXYCODONE HYDROCHLORIDE 10 MG: 5 TABLET ORAL at 03:39

## 2024-01-25 RX ADMIN — FAMOTIDINE 20 MG: 20 TABLET, FILM COATED ORAL at 20:10

## 2024-01-25 RX ADMIN — SODIUM CHLORIDE 100 ML/HR: 9 INJECTION, SOLUTION INTRAVENOUS at 14:42

## 2024-01-25 RX ADMIN — LISINOPRIL 10 MG: 10 TABLET ORAL at 08:43

## 2024-01-25 RX ADMIN — OXYCODONE HYDROCHLORIDE 10 MG: 5 TABLET ORAL at 20:24

## 2024-01-25 ASSESSMENT — COGNITIVE AND FUNCTIONAL STATUS - GENERAL
STANDING UP FROM CHAIR USING ARMS: A LOT
HELP NEEDED FOR BATHING: A LOT
CLIMB 3 TO 5 STEPS WITH RAILING: TOTAL
DRESSING REGULAR UPPER BODY CLOTHING: A LITTLE
MOVING TO AND FROM BED TO CHAIR: A LOT
PERSONAL GROOMING: A LITTLE
TURNING FROM BACK TO SIDE WHILE IN FLAT BAD: A LITTLE
MOVING FROM LYING ON BACK TO SITTING ON SIDE OF FLAT BED WITH BEDRAILS: A LITTLE
WALKING IN HOSPITAL ROOM: A LOT
MOBILITY SCORE: 13
DRESSING REGULAR LOWER BODY CLOTHING: A LOT
TOILETING: A LOT
DAILY ACTIVITIY SCORE: 16

## 2024-01-25 ASSESSMENT — PAIN SCALES - GENERAL
PAINLEVEL_OUTOF10: 0 - NO PAIN
PAINLEVEL_OUTOF10: 9
PAINLEVEL_OUTOF10: 7
PAINLEVEL_OUTOF10: 3
PAINLEVEL_OUTOF10: 3

## 2024-01-25 ASSESSMENT — PAIN - FUNCTIONAL ASSESSMENT
PAIN_FUNCTIONAL_ASSESSMENT: 0-10

## 2024-01-25 ASSESSMENT — ACTIVITIES OF DAILY LIVING (ADL): HOME_MANAGEMENT_TIME_ENTRY: 10

## 2024-01-25 ASSESSMENT — PAIN DESCRIPTION - LOCATION: LOCATION: KNEE

## 2024-01-25 ASSESSMENT — PAIN DESCRIPTION - ORIENTATION: ORIENTATION: LEFT

## 2024-01-25 NOTE — SIGNIFICANT EVENT
Medicine -     Spoke with Ortho NP -   Patient is septic without organ failure at this time  Per Ortho, I.D is aware and Ortho is working on sepsis protocol.     Reviewed labs - Repeat BC pending   Changed LR to Normal Saline 100 ml/hr      01/25/24 at 9:08 AM - SOLEDAD Chahal-CNP

## 2024-01-25 NOTE — PROGRESS NOTES
Physical Therapy    Physical Therapy Treatment    Patient Name: Melchor Bergeron  MRN: [id number]  Today's Date: 1/25/2024  Time Calculation  Start Time: 1003  Stop Time: 1035  Time Calculation (min): 32 min       Assessment/Plan   End of Session Patient Position:  (Patient reclined in chair, call light and tray in reach. Family at bedside.  Ortho NP also at bedside to perform dressing change.)    PT Plan  Inpatient/Swing Bed or Outpatient: Inpatient  Treatment/Interventions: Transfer training, Gait training, Endurance training, Therapeutic exercise, Therapeutic activity  PT Plan: Skilled PT  PT Frequency: 5 times per week  PT Discharge Recommendations: High intensity level of continued care  Equipment Recommended upon Discharge:  (pt states that he has a standard walker at home and can borrow a rollator if needed) PT Recommended Transfer Status: Assist x1, Assistive device    General Visit Information:   PT  Visit  PT Received On: 01/25/24    General  Family/Caregiver Present:  (Patient's significant other and his sister present during treatment)  Co-Treatment:  (co-treatment performed with OT to maximize patient safety and function.  One unit billed for PT)  Prior to Session Communication:  (Cleared by RN for PT.  Also spoke with ortho NP to clarify WB and ROM.)  Patient Position Received:  (Presents in bed, eager to get up and moving.  Agreeable to PT)    General Comment:  (Patient admitted with left knee septic arthritis)    General Observations:   General Observation:  1/24 left knee arthroscopy I&D   IV, tele, alarm    Precautions:  Precautions  LE Weight Bearing Status:  (LLE WBAT per ortho NP)  Medical Precautions: Fall precautions  Post-Surgical Precautions:  Cleared for left knee ROM    Pain:  Pain Assessment  Pain Assessment: 0-10  Pain Score: 7  Pain Location:  (left knee with WB)    Cognition:  Cognition  Overall Cognitive Status:  (pleasant and cooperative.  Motivated with goals.)  Orientation Level:  Oriented X4       Activity Tolerance:  Activity Tolerance  Endurance:  (Increased fatigue with activities.  Patient states he feels weaker than he did 2 days ago)      Therapeutic Exercise  Therapeutic Exercise Performed:  (seated LE ther-ex: DF/PF, GS, QS, HS, LAQ, seated hip abd, seated marching x10)      Bed Mobility  Bed Mobility:  (supine-->sit: minx1  Bed flat to mimic home set up.  Patient had difficulty advancing LLE towards EOB, used his right leg to scoot.  Heavy reliance on bedrails to pull hips over toward EOB.  Instructions to push through UE's.  Effortful for patient.  Increased time needed to complete    Ambulation/Gait Training  Ambulation/Gait Training Performed:  (Patient amb ~10 steps with ww and mod x2.  V/c given for sequencing.  Poor off loading of RLE to advance.  Heavy reliance on ww with UE's.  Patient reports his arms feel like they may give out.  Increased time needed to complete)    Transfers  Transfer:  (sit-->stand: mod x2 from EOB (height of bed elevated slightly to mimic home set up).  Instructions for hand placement.  Slow to lift buttocks from bed and difficutly bringing LLE under ABDULLAHI.)        Outcome Measures:  [facility] Basic Mobility  Turning from your back to your side while in a flat bed without using bedrails: A little  Moving from lying on your back to sitting on the side of a flat bed without using bedrails: A little  Moving to and from bed to chair (including a wheelchair): A lot  Standing up from a chair using your arms (e.g. wheelchair or bedside chair): A lot  To walk in hospital room: A lot  Climbing 3-5 steps with railing: Total  Basic Mobility - Total Score: 13    Education Documentation  Precautions, taught by Catarina Carrillo PTA at 1/25/2024 10:44 AM.  Learner: Patient  Readiness: Eager  Method: Explanation, Demonstration  Response: Demonstrated Understanding, Needs Reinforcement    Mobility Training, taught by Catarina Carrillo PTA at 1/25/2024 10:44 AM.  Learner:  Patient  Readiness: Eager  Method: Explanation, Demonstration  Response: Demonstrated Understanding, Needs Reinforcement        Encounter Problems       Encounter Problems (Active)       PT Problem       Pt will demonstrate mod I with bed mobility to edge of bed.   (Progressing)       Start:  01/21/24    Expected End:  02/04/24            Pt will demonstrate mod I  with sit to stand/chair transfers with FWW.   (Progressing)       Start:  01/21/24    Expected End:  02/04/24            Pt will ambulate 50 feet with FWW mod I NWB L LE .   (Progressing)       Start:  01/21/24    Expected End:  02/04/24         Goal Note       WB status changed from NWB to WBAT.  Evaluating PT notified              Pt to demo improved BLE strength by being able to complete supine/seated thera ex 2x20 BLEs with 4 or less rest breaks .   (Progressing)       Start:  01/21/24    Expected End:  02/04/24

## 2024-01-25 NOTE — PROGRESS NOTES
DAILY PROGRESS NOTE      POD 1 left knee arthroscopic I/D.     Patient doing fairly well  Visit Vitals  /83 (Patient Position: Lying)   Pulse (!) 124   Temp 37.1 °C (98.8 °F)   Resp 18      Temp (24hrs), Av.5 °C (99.5 °F), Min:36.9 °C (98.4 °F), Max:39.1 °C (102.4 °F)       Pain Control fair  No chest pain or shortness of breath.  No calf pain    Exam:  Extremity shows neuro vascular status intact. Flexion and extension intact on extremity.  There is no palpable effusion appreciated about the left knee.   Calves soft and non-tender without evidence of DVT.  Two small incision sites with packing assessed. Small amount of bloody drainage to abd pad. Packing removed with small amount of bloody drainage from the medial incision. Bleeding controlled by direct pressure.     Patient endorses pain to left knee. Pain is worse with movement and weight bearing. He reports mild pain about the left shoulder.   He is febrile this am at 39 degrees celsius and tachycardic at 139 concerning for sepsis.     WBC remains normal and is 8.2 this am.   Second set of blood cultures obtained yesterday and are pending however patient has been on IV antibiotics and these results may not be accurate however will continue to watch results of this.     Spoke in detail with ID who is in agreement with IR consult for aspiration of left shoulder joint and thoracentesis for evaluation of fluid, culture and presents of any purulent fluid.           Labs reviewed:     CBC:   Lab Results   Component Value Date    WBC 8.2 2024    WBC 6.4 2024    WBC 7.3 2024    HGB 14.1 2024    HGB 14.1 2024    HGB 14.9 2024       BMP:    Lab Results   Component Value Date     (L) 2024     (L) 2024     (L) 2024    K 4.5 2024    K 4.3 2024    K 4.3 2024     2024    CL 99 2024    CL 98 2024    CO2 20 (L) 2024    CO2 25 2024    CO2 24  01/24/2024    BUN 29 (H) 01/25/2024    BUN 27 (H) 01/24/2024    BUN 27 (H) 01/24/2024    CREATININE 1.18 01/25/2024    CREATININE 1.23 01/24/2024    CREATININE 1.28 01/24/2024          I&O  I/O last 3 completed shifts:  In: 1000 (13.3 mL/kg) [I.V.:1000 (13.3 mL/kg)]  Out: 1050 (14 mL/kg) [Urine:1050 (0.4 mL/kg/hr)]  Weight: 75.1 kg       Assessment:    Postop day 1 arthroscopic I&D left knee.  Patient with possible sepsis.  Afebrile 39 °C, tachycardic 139.  Patient given Tylenol and IV fluids for this  Interventional radiology consulted for left SC joint aspiration and thoracentesis for evaluation of empyema  Attempted transfer to [facility] for further evaluation of septic knee, septic joint and empyema.  Talk to Dr. Harriet Jackson who feels that patient can remain here at [facility] and complete further evaluation.  Dated patient and family who are in agreement with this.    Plan:   PT OT: Patient okay for weightbearing as tolerated to left knee and range of motion  Interventional radiology consult  Pulmonology consult pending  Continue IV antibiotics per infectious disease  Continue pain control  Discharge planning: To be determined.    DEANN Dupree MD  1/25/2024 11:16 AM

## 2024-01-25 NOTE — NURSING NOTE
Notified 1/25/2024 voicemail received on 1/24/24 from Venkata with [facility] at [phone] ext [phone] regarding Acute Rehab request to [facility]. Venkata states the facility is out of network and needs to be certain the customer is aware he would be utilizing his out of network benefits. she left a message for Antoinette Palomo at the Rehab hospital and is awaiting a call back from her as well. admit date 1/20/24.  Per Antoinette, spoke to them yesterday. I talked to his sister and gf as he was down for a procedure. They are going to call insurance today to find out what kind of OON benefits they have. Awaiting outcome.

## 2024-01-25 NOTE — PROGRESS NOTES
?  HISTORY OF PRESENT ILLNESS:      Presented left knee pain and swelling failing outpatient managementm status post cortisone injection 4 days prior developed severe pain about the knee         Cell count from right knee showed 129,000 white cells on 1/19/2023  Cultures from that fluid showing Streptococcus mitis oralis group     No crystals were seen in knee    Developed some left chest wall into shoulder pain  MRI showed rib joint swelling    No fevers    Going back to OR for knee swelling    Current Medications:    Current Facility-Administered Medications   Medication Dose Route Frequency Provider Last Rate Last Admin    acetaminophen (Tylenol) tablet 650 mg  650 mg oral q4h PRN SOLEDAD Raymundo-CNP   650 mg at 01/25/24 1323    cefepime (Maxipime) 1 g in dextrose 5 % 50 mL IV  1 g intravenous q8h Viri Gonzalez  mL/hr at 01/25/24 1449 1 g at 01/25/24 1449    cyclobenzaprine (Flexeril) tablet 10 mg  10 mg oral TID PRN DEANN Raymundo   10 mg at 01/25/24 0740    dextrose 10 % in water (D10W) infusion  0.3 g/kg/hr intravenous Once PRN DEANN Alex        dextrose 50 % injection 25 g  25 g intravenous q15 min PRN DEANN Alex   25 g at 01/22/24 0638    docusate sodium (Colace) capsule 100 mg  100 mg oral BID DEANN Raymundo   100 mg at 01/24/24 0900    [Held by provider] enoxaparin (Lovenox) syringe 40 mg  40 mg subcutaneous Daily SOLEDAD Raymundo-CNP   40 mg at 01/23/24 0752    [Held by provider] ertugliflozin (Steglatro) tablet 15 mg  15 mg oral Daily DEANN Alex        famotidine (Pepcid) tablet 20 mg  20 mg oral q12h SOLEDAD Alex-CNP   20 mg at 01/24/24 0900    glucagon (Glucagen) injection 1 mg  1 mg intramuscular q15 min PRN DEANN Alex        hydrALAZINE (Apresoline) injection 10 mg  10 mg intravenous q8h PRN Thania C Mohamed, APRN-CNP        insulin lispro (HumaLOG) injection 0-5 Units  0-5  Units subcutaneous Before meals & nightly DEANN Alex   2 Units at 01/23/24 2047    lidocaine 4 % patch 1 patch  1 patch transdermal Daily PRN DEANN Alex   1 patch at 01/22/24 1859    [Held by provider] lisinopril tablet 10 mg  10 mg oral Daily DEANN Alex   10 mg at 01/25/24 0843    morphine injection 2 mg  2 mg intravenous q4h PRN DEANN Raymundo   2 mg at 01/25/24 0842    naloxone (Narcan) injection 0.2 mg  0.2 mg intravenous q5 min PRN DEANN Raymundo        ondansetron ODT (Zofran-ODT) disintegrating tablet 4 mg  4 mg oral q8h PRN DEANN Raymundo        Or    ondansetron (Zofran) injection 4 mg  4 mg intravenous q8h PRN DEANN Raymundo   4 mg at 01/24/24 1717    oxychlorosene 4 Application in sodium chloride 0.9 % 1,000 mL infusion  2 g miscellaneous Once Melchor Martinez MD        oxyCODONE (Roxicodone) immediate release tablet 10 mg  10 mg oral q4h PRN DEANN Raymundo   10 mg at 01/25/24 0339    oxyCODONE (Roxicodone) immediate release tablet 5 mg  5 mg oral q4h PRN DEANN Raymundo   5 mg at 01/23/24 1124    rosuvastatin (Crestor) tablet 10 mg  10 mg oral Nightly DEANN Alex   10 mg at 01/24/24 2026    [Held by provider] semaglutide (Rybelsus) tablet 3 mg  3 mg oral Daily before breakfast DEANN Alex        sodium chloride 0.9 % bolus 500 mL  500 mL intravenous Once DEANN Chahal 500 mL/hr at 01/25/24 1205 Rate Change at 01/25/24 1205    sodium chloride 0.9% infusion  100 mL/hr intravenous Continuous DEANN Chahal   Stopped at 01/25/24 1449    vancomycin in dextrose 5 % (Vancocin) IVPB 750 mg  750 mg intravenous q12h Akila Berkowitz, PharmD            Allergies:    Allergies   Allergen Reactions    Sulfa (Sulfonamide Antibiotics) Hives and Rash    Metformin Diarrhea          Review of Systems  14 system review is negative other than HPI      "/83 (Patient Position: Lying)   Pulse (!) 116   Temp 38.2 °C (100.8 °F)   Resp 18   Ht 1.753 m (5' 9\")   Wt 75.1 kg (165 lb 9.1 oz)   SpO2 95%   BMI 24.45 kg/m²    Physical Exam:  General: Patient appears ok at the present time. NAD  Skin: no new rashes  Left upper lateralk chest wall tenderness  HEENT:  Neck is supple, No subconjunctival hemorrhages, no oral exudates  Heart: S1 S2  Lungs: diminished bases  Abdomen: soft, ND, NTTP,  Extrem: kneee swelling,            DATA:    .  Lab Results   Component Value Date    WBC 8.2 01/25/2024    HGB 14.1 01/25/2024    HCT 42.5 01/25/2024    MCV 84 01/25/2024     01/25/2024     Results from last 7 days   Lab Units 01/25/24  0601   SODIUM mmol/L 132*   POTASSIUM mmol/L 4.5   CHLORIDE mmol/L 100   CO2 mmol/L 20*   BUN mg/dL 29*   CREATININE mg/dL 1.18   CALCIUM mg/dL 8.4*   GLUCOSE mg/dL 105*     Susceptibility data from last 90 days.  Collected Specimen Info Organism Ceftriaxone Penicillin Tetracycline Vancomycin   01/20/24 Fluid from KNEE FINE NEEDLE ASPIRATION LEFT Streptococcus mitis/oralis group       01/19/24 Fluid from Synovial Streptococcus mitis/oralis group S S S S     Susceptibility data from last 90 days.  Collected Specimen Info Organism Ceftriaxone Penicillin Tetracycline Vancomycin   01/20/24 Fluid from KNEE FINE NEEDLE ASPIRATION LEFT Streptococcus mitis/oralis group       01/19/24 Fluid from Synovial Streptococcus mitis/oralis group S S S S             IMPRESSION:        Problem List Items Addressed This Visit          Infectious Diseases    Arthritis, septic (CMS/HCC) - Primary    Relevant Orders    Case Request Operating Room: Arthroscopy Knee (Completed)    Transthoracic Echo (TTE) Complete (Completed)    Transthoracic Echo (TTE) Complete (Completed)     Other Visit Diagnoses       Infection and inflammatory reaction due to vascular device, implant, and graft, initial encounter (CMS/[facility])        Relevant Orders    Sterile Fluid " Culture/Smear (Completed)    Sepsis due to other specified Staphylococcus (CMS/[facility])                 PLAN:    Await surgical findings    Ceftriaxone  increase dose    If significant amount for pleural fluid would pursue thoracentesis to make sure not empyema    2 - d echo, endocarditis would be odd though with this organism with negative blood cultures. Looks like from review blood cultures done before antibiotics      Benji Chavarria MD

## 2024-01-25 NOTE — PROGRESS NOTES
?  HISTORY OF PRESENT ILLNESS:      Presented left knee pain and swelling failing outpatient management status post cortisone injection 4 days prior developed severe pain about the knee         Cell count from right knee showed 129,000 white cells on 1/19/2023  Cultures from that fluid showing Streptococcus mitis oralis group     No crystals were seen in knee. Had washout.     Developed some left chest wall into shoulder pain  MRI showed rib joint swelling      Went  back to OR for knee swelling 1/24 . Founf to have hematoma    Had some fevers and tachycardia post op.:Last evening. Today ok. Feels tired. Some anterior rib pain    Current Medications:    Current Facility-Administered Medications   Medication Dose Route Frequency Provider Last Rate Last Admin    acetaminophen (Tylenol) tablet 650 mg  650 mg oral q4h PRN DEANN Raymundo   650 mg at 01/25/24 1323    cefepime (Maxipime) 1 g in dextrose 5 % 50 mL IV  1 g intravenous q8h Viri Gonzalez MD   Stopped at 01/25/24 1519    cyclobenzaprine (Flexeril) tablet 10 mg  10 mg oral TID PRN DEANN Raymundo   10 mg at 01/25/24 0740    dextrose 10 % in water (D10W) infusion  0.3 g/kg/hr intravenous Once PRN DEANN Alex        dextrose 50 % injection 25 g  25 g intravenous q15 min PRN DEANN Alex   25 g at 01/22/24 0638    docusate sodium (Colace) capsule 100 mg  100 mg oral BID DEANN Raymundo   100 mg at 01/24/24 0900    [Held by provider] enoxaparin (Lovenox) syringe 40 mg  40 mg subcutaneous Daily DEANN Raymundo   40 mg at 01/23/24 0752    [Held by provider] ertugliflozin (Steglatro) tablet 15 mg  15 mg oral Daily DEANN Alex        famotidine (Pepcid) tablet 20 mg  20 mg oral q12h DEANN Alex   20 mg at 01/24/24 0900    glucagon (Glucagen) injection 1 mg  1 mg intramuscular q15 min PRN Thania C Mohamed, APRN-CNP        hydrALAZINE (Apresoline) injection 10  mg  10 mg intravenous q8h PRN DEANN Alex        insulin lispro (HumaLOG) injection 0-5 Units  0-5 Units subcutaneous Before meals & nightly DEANN Alex   2 Units at 01/23/24 2047    lidocaine 4 % patch 1 patch  1 patch transdermal Daily PRN DEANN Alex   1 patch at 01/22/24 1859    [Held by provider] lisinopril tablet 10 mg  10 mg oral Daily DEANN Alex   10 mg at 01/25/24 0843    morphine injection 2 mg  2 mg intravenous q4h PRN DEANN Raymundo   2 mg at 01/25/24 0842    naloxone (Narcan) injection 0.2 mg  0.2 mg intravenous q5 min PRN DEANN Raymundo        ondansetron ODT (Zofran-ODT) disintegrating tablet 4 mg  4 mg oral q8h PRN DEANN Raymundo        Or    ondansetron (Zofran) injection 4 mg  4 mg intravenous q8h PRN DEANN Raymundo   4 mg at 01/24/24 1717    oxychlorosene 4 Application in sodium chloride 0.9 % 1,000 mL infusion  2 g miscellaneous Once Melchor Martinez MD        oxyCODONE (Roxicodone) immediate release tablet 10 mg  10 mg oral q4h PRN SOLEDAD Raymundo-CNP   10 mg at 01/25/24 0339    oxyCODONE (Roxicodone) immediate release tablet 5 mg  5 mg oral q4h PRN DEANN Raymundo   5 mg at 01/23/24 1124    rosuvastatin (Crestor) tablet 10 mg  10 mg oral Nightly DEANN Alex   10 mg at 01/24/24 2026    [Held by provider] semaglutide (Rybelsus) tablet 3 mg  3 mg oral Daily before breakfast DEANN Alex        sodium chloride 0.9 % bolus 500 mL  500 mL intravenous Once DEANN Chahal 500 mL/hr at 01/25/24 1205 Rate Change at 01/25/24 1205    sodium chloride 0.9% infusion  100 mL/hr intravenous Continuous Marilu Aviles APRN- mL/hr at 01/25/24 1520 100 mL/hr at 01/25/24 1520    vancomycin in dextrose 5 % (Vancocin) IVPB 750 mg  750 mg intravenous q12h Akila Berkowitz, PharmD 200 mL/hr at 01/25/24 1608 750 mg at 01/25/24 1608     "    Allergies:    Allergies   Allergen Reactions    Sulfa (Sulfonamide Antibiotics) Hives and Rash    Metformin Diarrhea          Review of Systems  14 system review is negative other than HPI     /77   Pulse 110   Temp 38.2 °C (100.8 °F) (Temporal)   Resp 16   Ht 1.753 m (5' 9\")   Wt 75.1 kg (165 lb 9.1 oz)   SpO2 97%   BMI 24.45 kg/m²    Physical Exam:  General: Patient appears ok at the present time. NAD  Skin: no new rashes  Left upper lateralk chest wall tenderness  HEENT:  Neck is supple, No subconjunctival hemorrhages, no oral exudates  Heart: S1 S2  Lungs: diminished bases  Abdomen: soft, ND, NTTP,  Extrem: kneee swelling,            DATA:    .  Lab Results   Component Value Date    WBC 8.2 01/25/2024    HGB 14.1 01/25/2024    HCT 42.5 01/25/2024    MCV 84 01/25/2024     01/25/2024     Results from last 7 days   Lab Units 01/25/24  0601   SODIUM mmol/L 132*   POTASSIUM mmol/L 4.5   CHLORIDE mmol/L 100   CO2 mmol/L 20*   BUN mg/dL 29*   CREATININE mg/dL 1.18   CALCIUM mg/dL 8.4*   GLUCOSE mg/dL 105*     Susceptibility data from last 90 days.  Collected Specimen Info Organism Ceftriaxone Penicillin Tetracycline Vancomycin   01/20/24 Fluid from KNEE FINE NEEDLE ASPIRATION LEFT Streptococcus mitis/oralis group       01/19/24 Fluid from Synovial Streptococcus mitis/oralis group S S S S     Susceptibility data from last 90 days.  Collected Specimen Info Organism Ceftriaxone Penicillin Tetracycline Vancomycin   01/20/24 Fluid from KNEE FINE NEEDLE ASPIRATION LEFT Streptococcus mitis/oralis group       01/19/24 Fluid from Synovial Streptococcus mitis/oralis group S S S S             IMPRESSION:        Problem List Items Addressed This Visit          Infectious Diseases    Arthritis, septic (CMS/HCC) - Primary    Relevant Orders    Case Request Operating Room: Arthroscopy Knee (Completed)    Transthoracic Echo (TTE) Complete (Completed)    Transthoracic Echo (TTE) Complete (Completed)     Other " Visit Diagnoses       Infection and inflammatory reaction due to vascular device, implant, and graft, initial encounter (CMS/[facility])        Relevant Orders    Sterile Fluid Culture/Smear (Completed)    Sepsis due to other specified Staphylococcus (CMS/[facility])               Estimated Creatinine Clearance: 64.9 mL/min (by C-G formula based on SCr of 1.18 mg/dL).    PLAN:        antibiotics expanded for SIRs yesterday , but could be from surgery itself, previous hematoma     Repeat cultures present     If significant amount for pleural fluid would pursue thoracentesis to make sure not empyema, would see if can sample rib space as wel    2 - d echo looks ok , endocarditis would be odd though with this organism with negative blood cultures. Looks like from review blood cultures done before antibiotics    Clinically no further metastatic sites of infection identified but if fevers continue, will likely need more imaging studies     Discussed with orthopedics and medical team      Benji Chavarria MD

## 2024-01-25 NOTE — PROGRESS NOTES
Occupational Therapy    OT Treatment    Patient Name: Melchor Bergeron  MRN: [id number]  Today's Date: 1/25/2024  Time Calculation  Start Time: 1002  Stop Time: 1026  Time Calculation (min): 24 min         Assessment:  End of Session Communication: Bedside nurse  End of Session Patient Position: Up in chair, Alarm on       Subjective   Previous Visit Info:  OT Last Visit  OT Received On: 01/25/24  General:  General  Family/Caregiver Present:  (pt sister and significant other present throughout session)  Co-Treatment:  (cotreat with PTA to increase pt safety and indep with functional transfers/ADLs)  Prior to Session Communication:  (RN cleared pt for session, also spoke wiht ortho NP to clarify WB status and ROM)  Patient Position Received: Bed, 2 rail up, Alarm on  Precautions:  LE Weight Bearing Status:  (LLE WBAT per ortho NP)  Medical Precautions: Fall precautions  Post-Surgical Precautions:  (KI not needed per ortho NP)  Vital Signs:     Pain:  Pain Assessment  Pain Assessment: 0-10  Pain Location:  (L knee when WB)      Activities of Daily Living: LE Dressing  LE Dressing:  (pt seated EOB threading shorts over BLE with increased time. pt standing with FWW required min A for steadying while performing clothing mgmt over B hips. pt educated on alternating hand placement on FWW when performing.)  Functional Standing Tolerance:  Functional Standing Tolerance Comments: pt demos F - supported standing balance during functional transfers.  Bed Mobility/Transfers: Transfers  Transfer:  (pt required mod A x 2 for functional transfers with use of FWW, pt educated on safe hand placement when performing sit  < > stand transfers, pt attempting to pull up from walker. pt able to WB on LLE when performing)    Outcome Measures:     Education Documentation  ADL Training, taught by MANUEL Marroquin at 1/25/2024 12:13 PM.  Learner: Family, Significant Other, Patient  Readiness: Acceptance  Method: Explanation  Response: Needs  Reinforcement  Comment: pt and pt family educated on OT POC and EC techs such as use of LH AE    Education Comments  No comments found.        OP EDUCATION:       Goals:  Encounter Problems       Encounter Problems (Active)       OT Goals       OT Goal 1 (Progressing)       Start:  01/21/24    Expected End:  02/04/24       Patient will complete UB bathing and dressing with Mod I/Independent with AE as needed         OT Goal 2 (Progressing)       Start:  01/21/24    Expected End:  02/04/24       Patient will complete LB bathing and dressing with CGA/SBA and with AE as needed.         OT Goal 3 (Progressing)       Start:  01/21/24    Expected End:  02/04/24       Patient will complete all transfers for ADLs and functional activities with CGA/Min A with FWW as needed.

## 2024-01-25 NOTE — PROGRESS NOTES
"Vancomycin Dosing by Pharmacy- INITIAL    Melchor Bergeron is a 63 y.o. year old male who Pharmacy has been consulted for vancomycin dosing for osteomyelitis/septic arthritis. Based on the patient's indication and renal status this patient will be dosed based on a goal AUC of 400-600.     Renal function is currently stable.    Visit Vitals  /83 (Patient Position: Lying)   Pulse (!) 116   Temp 38.2 °C (100.8 °F)   Resp 18        Lab Results   Component Value Date    CREATININE 1.18 01/25/2024    CREATININE 1.23 01/24/2024    CREATININE 1.28 01/24/2024    CREATININE 1.15 01/23/2024        Patient weight is No results found for: \"PTWEIGHT\"    No results found for: \"CULTURE\"     I/O last 3 completed shifts:  In: 1000 (13.3 mL/kg) [I.V.:1000 (13.3 mL/kg)]  Out: 1050 (14 mL/kg) [Urine:1050 (0.4 mL/kg/hr)]  Weight: 75.1 kg   [unfilled]    No results found for: \"PATIENTTEMP\"       Assessment/Plan     Patient will not be given a loading dose.  Will initiate vancomycin maintenance,  750 mg every 12 hours.    This dosing regimen is predicted by [facility] to result in the following pharmacokinetic parameters:  Regimen: 750 mg IV every 12 hours.  Start time: 14:31 on 01/25/2024  Exposure target: AUC24 (range)400-600 mg/L.hr   AUC24,ss: 482 mg/L.hr  Probability of AUC24 > 400: 83 %  Ctrough,ss: 15.6 mg/L  Probability of Ctrough,ss > 20: 17 %  Probability of nephrotoxicity (Lodise CARMEL 2009): 11 %      Follow-up level will be ordered on 01/25/24 at 2100 and 01/26/24 at 0900 unless clinically indicated sooner.  Will continue to monitor renal function daily while on vancomycin and order serum creatinine at least every 48 hours if not already ordered.  Follow for continued vancomycin needs, clinical response, and signs/symptoms of toxicity.       Akila Berkowitz, PharmD       "

## 2024-01-25 NOTE — PROGRESS NOTES
Amarjit Collins is a 63 y.o. 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      Subjective     Patient examined and seen. Alert and oriented x3, resting comfortably.  Patient denies chest pain, shortness of breath, palpitations, abdominal pain, fever or chills.    He still complains of Left Knee Pain. Currently ACE wrapped. He states overall he is feeling better. He still has left sternoclavicular joint pain.       Objective     Orthopedics request patient to be transferred down town due to complexity of case.  At this time - Orthopedics at [facility] [place] declines transfer    Echo is pending for evaluation - see below results    Infectious Disease is following ABX per ID        Last Recorded Vitals  /83 (Patient Position: Lying)   Pulse (!) 124   Temp 37.1 °C (98.8 °F)   Resp 18   Wt 75.1 kg (165 lb 9.1 oz)   SpO2 95%   Intake/Output last 3 Shifts:    Intake/Output Summary (Last 24 hours) at 1/25/2024 1134  Last data filed at 1/25/2024 1059  Gross per 24 hour   Intake 1220 ml   Output 1050 ml   Net 170 ml       Admission Weight  Weight: 75.1 kg (165 lb 9.1 oz) (01/20/24 0635)    Daily Weight  01/20/24 : 75.1 kg (165 lb 9.1 oz)    Image Results  Transthoracic Echo (TTE) [place] [id number]   Tel [phone] Fax [phone]    TRANSTHORACIC ECHOCARDIOGRAM REPORT       Patient Name:      AMARJIT COLLINS        Reading Physician:    [id number] Dhaval Wisdom DO  Study Date:        1/25/2024            Ordering Provider:    [id number] YULISSA DWYER  MRN/PID:           [id number]             Fellow:  Accession#:        [id number]         Nurse:                Tad Bernard RN  Date of Birth/Age: 1960 / 63 years Sonographer:          Felecia Gonsalez                                                                 RCS  Gender:            M                    Additional Staff:  Height:            175.26 cm            Admit Date:           1/19/2024  Weight:            74.84 kg             Admission Status:     Inpatient -                                                                Routine  BSA:               1.90 m2              Department Location:  [facility]                                                                Echo Lab  Blood Pressure: 149 /72 mmHg    Study Type:    TRANSTHORACIC ECHO (TTE) COMPLETE  Diagnosis/ICD: Sepsis due to other specified staphylococcus-A41.1  Indication:    SEPSIS, ENDOCARDITIS  CPT Codes:     Echo Complete w Full [id number]    Patient History:  Diabetes:          Yes  Pertinent History: HTN, Hyperlipidemia and Sepsis.    Study Detail: The following Echo studies were performed: 2D, M-Mode, Doppler and                color flow. Technically challenging study due to poor acoustic                windows and prominent lung artifact. Definity used as a contrast                agent for endocardial border definition. Total contrast used for                this procedure was 1.5 mL via IV push. Unable to obtain IVC view.                The patient was awake.       PHYSICIAN INTERPRETATION:  Left Ventricle: Left ventricular systolic function is normal, with an estimated ejection fraction of 60-65%. There are no regional wall motion abnormalities. The left ventricular cavity size is normal. There is mild concentric left ventricular hypertrophy. Spectral Doppler shows an impaired relaxation pattern of left ventricular diastolic filling.  LV Wall Scoring:  All segments are normal.    Left Atrium: The left atrium is normal in size.  Right Ventricle: The right ventricle is normal in size. There is normal right ventricular global systolic function.  Right Atrium: The right atrium is normal in size.  Aortic Valve: The aortic valve appears structurally normal. The aortic valve  appears tricuspid. There is mild aortic valve cusp calcification. There is no evidence of aortic valve stenosis.  There is no evidence of aortic valve regurgitation. The peak instantaneous gradient of the aortic valve is 6.8 mmHg. The mean gradient of the aortic valve is 4.0 mmHg.  Mitral Valve: The mitral valve is normal in structure. There is no evidence of mitral valve stenosis. There is normal mitral valve leaflet mobility. There is mild mitral annular calcification. There is no evidence of mitral valve regurgitation.  Tricuspid Valve: The tricuspid valve is structurally normal. There is normal tricuspid valve leaflet mobility. There is trace tricuspid regurgitation.  Pulmonic Valve: The pulmonic valve is not well visualized. There is no indication of pulmonic valve regurgitation.  Pericardium: There is no pericardial effusion noted.  Aorta: The aortic root is normal.  Pulmonary Artery: The main pulmonary artery is normal in size, and position, with normal bifurcation into the left and right pulmonary arteries.  Systemic Veins: The inferior vena cava was not well visualized.  In comparison to the previous echocardiogram(s): There are no prior studies on this patient for comparison purposes.       CONCLUSIONS:   1. Left ventricular systolic function is normal with a 60-65% estimated ejection fraction.   2. No definite valvular vegetations were visualized.   3. Spectral Doppler shows an impaired relaxation pattern of left ventricular diastolic filling.   4. There is no evidence of mitral valve stenosis.   5. No evidence of mitral valve regurgitation.   6. Trace tricuspid regurgitation is visualized.   7. Aortic valve stenosis is not present.   8. The main pulmonary artery is normal in size, and position, with normal bifurcation into the left and right pulmonary arteries.    RECOMMENDATIONS:  Technically suboptimal and limited study, therefore accuracy of above interpretation could be substantially diminished.  Clinical correlation is advised. Consider additional imaging modalities if clinically indicated. Transthoracic echo has low negative predictive value for mass, vegetation, or embolic source. Consider KENYA or MRI if clinically indicated.     QUANTITATIVE DATA SUMMARY:  2D MEASUREMENTS:                           Normal Ranges:  Ao Root d:     3.30 cm   (2.0-3.7cm)  LAs:           3.10 cm   (2.7-4.0cm)  IVSd:          1.13 cm   (0.6-1.1cm)  LVPWd:         1.19 cm   (0.6-1.1cm)  LVIDd:         3.68 cm   (3.9-5.9cm)  LVIDs:         2.52 cm  LV Mass Index: 72.9 g/m2  LV % FS        31.5 %    LA VOLUME:                                Normal Ranges:  LA Vol A4C:        43.8 ml    (22+/-6mL/m2)  LA Vol A2C:        31.9 ml  LA Vol BP:         39.4 ml  LA Vol Index A4C:  23.0ml/m2  LA Vol Index A2C:  16.8 ml/m2  LA Vol Index BP:   20.7 ml/m2  LA Area A4C:       16.4 cm2  LA Area A2C:       13.3 cm2  LA Major Axis A4C: 5.2 cm  LA Major Axis A2C: 4.7 cm  LA Volume Index:   19.5 ml/m2  LA Vol A4C:        42.0 ml  LA Vol A2C:        30.0 ml    RA VOLUME BY A/L METHOD:                                Normal Ranges:  RA Vol A4C:        41.3 ml    (8.3-19.5ml)  RA Vol Index A4C:  21.7 ml/m2  RA Area A4C:       16.2 cm2  RA Major Axis A4C: 5.4 cm    AORTA MEASUREMENTS:                     Normal Ranges:  Asc Ao, d: 3.50 cm (2.1-3.4cm)    LV SYSTOLIC FUNCTION BY 2D PLANIMETRY (MOD):                      Normal Ranges:  EF-A4C View: 66.7 % (>=55%)  EF-A2C View: 67.1 %  EF-Biplane:  65.8 %    LV DIASTOLIC FUNCTION:                            Normal Ranges:  MV Peak E:     0.82 m/s   (0.7-1.2 m/s)  MV Peak A:     1.00 m/s   (0.42-0.7 m/s)  E/A Ratio:     0.82       (1.0-2.2)  MV lateral e'  0.11 m/s  MV medial e'   0.08 m/s  E/e' Ratio:    7.20       (<8.0)  PulmV Sys Mann: 54.10 cm/s    MITRAL VALVE:                 Normal Ranges:  MV DT: 82 msec (150-240msec)    AORTIC VALVE:                                    Normal Ranges:  AoV Vmax:                 1.30 m/s (<=1.7m/s)  AoV Peak P.8 mmHg (<20mmHg)  AoV Mean P.0 mmHg (1.7-11.5mmHg)  LVOT Max Mann:            1.12 m/s (<=1.1m/s)  AoV VTI:                 21.10 cm (18-25cm)  LVOT VTI:                15.10 cm  LVOT Diameter:           2.00 cm  (1.8-2.4cm)  AoV Area, VTI:           2.25 cm2 (2.5-5.5cm2)  AoV Area,Vmax:           2.71 cm2 (2.5-4.5cm2)  AoV Dimensionless Index: 0.72       RIGHT VENTRICLE:  RV Basal 3.49 cm  RV Mid   2.56 cm  RV Major 7.4 cm  TAPSE:   28.7 mm  RV s'    0.23 m/s    TRICUSPID VALVE/RVSP:                              Normal Ranges:  Peak TR Velocity: 2.54 m/s  RV Syst Pressure: 28.8 mmHg (< 30mmHg)    PULMONIC VALVE:                          Normal Ranges:  PV Accel Time: 85 msec  (>120ms)  PV Max Mann:    1.3 m/s  (0.6-0.9m/s)  PV Max P.0 mmHg    Pulmonary Veins:  PulmV Sys Mann: 54.10 cm/s       [id number] Dhaval Wisdom DO  Electronically signed on 2024 at 11:14:32 AM       Wall Scoring       ** Final **  ECG 12 lead  Sinus tachycardia  Inferior infarct (cited on or before 2024)  Abnormal ECG  When compared with ECG of 2024 15:47,  Nonspecific T wave abnormality no longer evident in Lateral leads      Physical Exam  Constitutional: Well developed, awake/alert/oriented x3, cooperative  Respiratory/Thorax: Patent airways,  normal breath sounds  Cardiovascular: Regular, rate and rhythm, no murmurs,   Musculoskeletal: decrease range of motion to left knee , area covered with ACE, Left shoulder tenderness with palpitation and reproducible, left clavical/ S.C joint tenderness with palpation   Extremities: normal extremities,  incision covered with ACE   Skin: warm, dry, intact except as ntoed   Neurological: alert/oriented x 3, speech clear,     Psychiatric: appropriate mood and behavior     Relevant Results  Scheduled medications  cefTRIAXone, 2 g, intravenous, q24h  docusate sodium, 100 mg, oral, BID  [Held by provider]  enoxaparin, 40 mg, subcutaneous, Daily  [Held by provider] ertugliflozin, 15 mg, oral, Daily  famotidine, 20 mg, oral, q12h  insulin lispro, 0-5 Units, subcutaneous, Before meals & nightly  lisinopril, 10 mg, oral, Daily  oxychlorosene 4 Application in sodium chloride 0.9 % 1,000 mL infusion, 2 g, miscellaneous, Once  rosuvastatin, 10 mg, oral, Nightly  [Held by provider] semaglutide, 3 mg, oral, Daily before breakfast      Continuous medications  sodium chloride 0.9%, 100 mL/hr, Last Rate: 100 mL/hr (01/25/24 1059)      PRN medications  PRN medications: acetaminophen, cyclobenzaprine, dextrose 10 % in water (D10W), dextrose, glucagon, hydrALAZINE, lidocaine, morphine, naloxone, ondansetron ODT **OR** ondansetron, oxyCODONE, oxyCODONE    Results for orders placed or performed during the hospital encounter of 01/20/24 (from the past 24 hour(s))   Blood Culture    Specimen: Peripheral Venipuncture; Blood culture   Result Value Ref Range    Blood Culture Loaded on Instrument - Culture in progress    Blood Culture    Specimen: Peripheral Venipuncture; Blood culture   Result Value Ref Range    Blood Culture Loaded on Instrument - Culture in progress    POCT GLUCOSE   Result Value Ref Range    POCT Glucose 79 74 - 99 mg/dL   CBC   Result Value Ref Range    WBC 8.2 4.4 - 11.3 x10*3/uL    nRBC 0.0 0.0 - 0.0 /100 WBCs    RBC 5.06 4.50 - 5.90 x10*6/uL    Hemoglobin 14.1 13.5 - 17.5 g/dL    Hematocrit 42.5 41.0 - 52.0 %    MCV 84 80 - 100 fL    MCH 27.9 26.0 - 34.0 pg    MCHC 33.2 32.0 - 36.0 g/dL    RDW 13.7 11.5 - 14.5 %    Platelets 191 150 - 450 x10*3/uL   Basic metabolic panel   Result Value Ref Range    Glucose 105 (H) 74 - 99 mg/dL    Sodium 132 (L) 136 - 145 mmol/L    Potassium 4.5 3.5 - 5.3 mmol/L    Chloride 100 98 - 107 mmol/L    Bicarbonate 20 (L) 21 - 32 mmol/L    Anion Gap 17 10 - 20 mmol/L    Urea Nitrogen 29 (H) 6 - 23 mg/dL    Creatinine 1.18 0.50 - 1.30 mg/dL    eGFR 70 >60 mL/min/1.73m*2    Calcium 8.4 (L)  8.6 - 10.3 mg/dL   HIV 1/2 Antigen/Antibody Screen with Reflex to Confirmation   Result Value Ref Range    HIV 1/2 Antigen/Antibody Screen with Reflex to Confirmation Nonreactive Nonreactive   POCT GLUCOSE   Result Value Ref Range    POCT Glucose 122 (H) 74 - 99 mg/dL   Transthoracic Echo (TTE) Complete   Result Value Ref Range    AV mn grad 4.0 mmHg    AV pk domenico 1.30 m/s    LV biplane EF 66 %    LVOT diam 2.00 cm    MV E/A ratio 0.82     Tricuspid annular plane systolic excursion 2.9 cm    MV avg E/e' ratio 7.20     LA vol index A/L 20.7 ml/m2    RV free wall pk S' 22.80 cm/s    RVSP 28.8 mmHg    LVIDd 3.68 cm    Aortic Valve Area by Continuity of Peak Velocity 2.71 cm2    AV pk grad 6.8 mmHg    Aortic Valve Area by Continuity of VTI 2.25 cm2    LV A4C EF 66.7    ECG 12 lead   Result Value Ref Range    Ventricular Rate 124 BPM    Atrial Rate 124 BPM    PA Interval 136 ms    QRS Duration 82 ms    QT Interval 332 ms    QTC Calculation(Bazett) 476 ms    P Axis 73 degrees    R Axis -3 degrees    T Axis 66 degrees    QRS Count 20 beats    Q Onset 212 ms    P Onset 144 ms    P Offset 195 ms    T Offset 378 ms    QTC Fredericia 422 ms   POCT GLUCOSE   Result Value Ref Range    POCT Glucose 126 (H) 74 - 99 mg/dL       Transthoracic Echo (TTE) Complete    Result Date: 1/25/2024          [place] [id number]  Tel [phone] Fax [phone] TRANSTHORACIC ECHOCARDIOGRAM REPORT  Patient Name:      AMARJIT Benítez Physician:    [id number] Dhaval Wisdom DO Study Date:        1/25/2024            Ordering Provider:    [id number] DWYER MRN/PID:           [id number]             Fellow: Accession#:        [id number]         Nurse:                Tad Bernard RN Date of Birth/Age: 1960 / 63 years Sonographer:          Felecia Gonsalez                                                                RCS Gender:            M                    Additional Staff: Height:            175.26 cm            Admit Date:           1/19/2024 Weight:            74.84 kg             Admission Status:     Inpatient -                                                               Routine BSA:               1.90 m2              Department Location:  [facility]                                                               Echo Lab Blood Pressure: 149 /72 mmHg Study Type:    TRANSTHORACIC ECHO (TTE) COMPLETE Diagnosis/ICD: Sepsis due to other specified staphylococcus-A41.1 Indication:    SEPSIS, ENDOCARDITIS CPT Codes:     Echo Complete w Full Doppler-[id number] Patient History: Diabetes:          Yes Pertinent History: HTN, Hyperlipidemia and Sepsis. Study Detail: The following Echo studies were performed: 2D, M-Mode, Doppler and               color flow. Technically challenging study due to poor acoustic               windows and prominent lung artifact. Definity used as a contrast               agent for endocardial border definition. Total contrast used for               this procedure was 1.5 mL via IV push. Unable to obtain IVC view.               The patient was awake.  PHYSICIAN INTERPRETATION: Left Ventricle: Left ventricular systolic function is normal, with an estimated ejection fraction of 60-65%. There are no regional wall motion abnormalities. The left ventricular cavity size is normal. There is mild concentric left ventricular hypertrophy. Spectral Doppler shows an impaired relaxation pattern of left ventricular diastolic filling. LV Wall Scoring: All segments are normal. Left Atrium: The left atrium is normal in size. Right Ventricle: The right ventricle is normal in size. There is normal right ventricular global systolic function. Right Atrium: The right atrium is normal in size. Aortic Valve: The aortic valve appears structurally normal. The aortic valve appears  tricuspid. There is mild aortic valve cusp calcification. There is no evidence of aortic valve stenosis. There is no evidence of aortic valve regurgitation. The peak instantaneous gradient of the aortic valve is 6.8 mmHg. The mean gradient of the aortic valve is 4.0 mmHg. Mitral Valve: The mitral valve is normal in structure. There is no evidence of mitral valve stenosis. There is normal mitral valve leaflet mobility. There is mild mitral annular calcification. There is no evidence of mitral valve regurgitation. Tricuspid Valve: The tricuspid valve is structurally normal. There is normal tricuspid valve leaflet mobility. There is trace tricuspid regurgitation. Pulmonic Valve: The pulmonic valve is not well visualized. There is no indication of pulmonic valve regurgitation. Pericardium: There is no pericardial effusion noted. Aorta: The aortic root is normal. Pulmonary Artery: The main pulmonary artery is normal in size, and position, with normal bifurcation into the left and right pulmonary arteries. Systemic Veins: The inferior vena cava was not well visualized. In comparison to the previous echocardiogram(s): There are no prior studies on this patient for comparison purposes.  CONCLUSIONS:  1. Left ventricular systolic function is normal with a 60-65% estimated ejection fraction.  2. No definite valvular vegetations were visualized.  3. Spectral Doppler shows an impaired relaxation pattern of left ventricular diastolic filling.  4. There is no evidence of mitral valve stenosis.  5. No evidence of mitral valve regurgitation.  6. Trace tricuspid regurgitation is visualized.  7. Aortic valve stenosis is not present.  8. The main pulmonary artery is normal in size, and position, with normal bifurcation into the left and right pulmonary arteries. RECOMMENDATIONS: Technically suboptimal and limited study, therefore accuracy of above interpretation could be substantially diminished. Clinical correlation is advised.  Consider additional imaging modalities if clinically indicated. Transthoracic echo has low negative predictive value for mass, vegetation, or embolic source. Consider KENYA or MRI if clinically indicated.  QUANTITATIVE DATA SUMMARY: 2D MEASUREMENTS:                          Normal Ranges: Ao Root d:     3.30 cm   (2.0-3.7cm) LAs:           3.10 cm   (2.7-4.0cm) IVSd:          1.13 cm   (0.6-1.1cm) LVPWd:         1.19 cm   (0.6-1.1cm) LVIDd:         3.68 cm   (3.9-5.9cm) LVIDs:         2.52 cm LV Mass Index: 72.9 g/m2 LV % FS        31.5 % LA VOLUME:                               Normal Ranges: LA Vol A4C:        43.8 ml    (22+/-6mL/m2) LA Vol A2C:        31.9 ml LA Vol BP:         39.4 ml LA Vol Index A4C:  23.0ml/m2 LA Vol Index A2C:  16.8 ml/m2 LA Vol Index BP:   20.7 ml/m2 LA Area A4C:       16.4 cm2 LA Area A2C:       13.3 cm2 LA Major Axis A4C: 5.2 cm LA Major Axis A2C: 4.7 cm LA Volume Index:   19.5 ml/m2 LA Vol A4C:        42.0 ml LA Vol A2C:        30.0 ml RA VOLUME BY A/L METHOD:                               Normal Ranges: RA Vol A4C:        41.3 ml    (8.3-19.5ml) RA Vol Index A4C:  21.7 ml/m2 RA Area A4C:       16.2 cm2 RA Major Axis A4C: 5.4 cm AORTA MEASUREMENTS:                    Normal Ranges: Asc Ao, d: 3.50 cm (2.1-3.4cm) LV SYSTOLIC FUNCTION BY 2D PLANIMETRY (MOD):                     Normal Ranges: EF-A4C View: 66.7 % (>=55%) EF-A2C View: 67.1 % EF-Biplane:  65.8 % LV DIASTOLIC FUNCTION:                           Normal Ranges: MV Peak E:     0.82 m/s   (0.7-1.2 m/s) MV Peak A:     1.00 m/s   (0.42-0.7 m/s) E/A Ratio:     0.82       (1.0-2.2) MV lateral e'  0.11 m/s MV medial e'   0.08 m/s E/e' Ratio:    7.20       (<8.0) PulmV Sys Mann: 54.10 cm/s MITRAL VALVE:                Normal Ranges: MV DT: 82 msec (150-240msec) AORTIC VALVE:                                   Normal Ranges: AoV Vmax:                1.30 m/s (<=1.7m/s) AoV Peak P.8 mmHg (<20mmHg) AoV Mean P.0  mmHg (1.7-11.5mmHg) LVOT Max Mann:            1.12 m/s (<=1.1m/s) AoV VTI:                 21.10 cm (18-25cm) LVOT VTI:                15.10 cm LVOT Diameter:           2.00 cm  (1.8-2.4cm) AoV Area, VTI:           2.25 cm2 (2.5-5.5cm2) AoV Area,Vmax:           2.71 cm2 (2.5-4.5cm2) AoV Dimensionless Index: 0.72  RIGHT VENTRICLE: RV Basal 3.49 cm RV Mid   2.56 cm RV Major 7.4 cm TAPSE:   28.7 mm RV s'    0.23 m/s TRICUSPID VALVE/RVSP:                             Normal Ranges: Peak TR Velocity: 2.54 m/s RV Syst Pressure: 28.8 mmHg (< 30mmHg) PULMONIC VALVE:                         Normal Ranges: PV Accel Time: 85 msec  (>120ms) PV Max Mann:    1.3 m/s  (0.6-0.9m/s) PV Max P.0 mmHg Pulmonary Veins: PulmV Sys Mann: 54.10 cm/s  [id number] Dhaval Wisdom DO Electronically signed on 2024 at 11:14:32 AM  Wall Scoring  ** Final **     ECG 12 lead    Result Date: 2024  Sinus tachycardia Inferior infarct (cited on or before 2024) Abnormal ECG When compared with ECG of 2024 15:47, Nonspecific T wave abnormality no longer evident in Lateral leads    ECG 12 lead    Result Date: 2024  Sinus tachycardia Inferior infarct , age undetermined Abnormal ECG No previous ECGs available Confirmed by Ramón Li ([id number]) on 2024 5:02:31 PM    MR knee left wo IV contrast    Result Date: 2024  Interpreted By:  Dhaval Vazquez, STUDY: MR KNEE LEFT WO IV CONTRAST;  2024 9:44 am   INDICATION: Signs/Symptoms:history septic arthritis rule out other abscess.   COMPARISON: None.   ACCESSION NUMBER(S): [id number]   ORDERING CLINICIAN: GERMAN HUGHES   TECHNIQUE: Multiplanar and multisequential MR images of the left knee are performed. Intravenous gadolinium is not administered. The study is limited by motion degradation.   FINDINGS: There is a moderate-sized joint effusion. There is some synovial lipomatous hypertrophy at the suprapatellar bursa with lobular peripheral fat signal. There are 2  rounded foci of low T1 weighted and T2 weighted signal with mild susceptibility. These findings are identified medial of midline. The larger focus measures 11 mm in maximum diameter. The smaller focus measures 5 mm in maximum diameter. These findings could reflect loose bodies or possibly air. In addition to fluid and fat signal there is some lobular signal in the suprapatellar bursa which is of intermediate to low proton density signal and low signal and fat saturated T2 weighted sequences. This appearance could be related to blood product. There is some edema and fluid in Hoffa's fat   There is ill-defined fluid and edema in the subcutaneous fat circumferentially. There is no organized fluid collection. There is fluid in the intermuscular fascia posteriorly at the level of the calf with some fluid tracking between the medial and lateral gastrocnemius muscle bellies and the gastrocnemius and soleus muscle bellies. There is no abscess formation.   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 There is full-thickness loss of the articular cartilage in the medial compartment with fluid in the joint space. There is mild subchondral edema across the joint which is more likely degenerative. There is minimal subcortical degenerative signal also noted across the lateral compartment. There is no overt bone destruction.   The medial meniscus is abnormal. The posterior horn demonstrates some diffuse contour irregularity with complex signal at the inner 3rd and middle 3rd of the meniscus. Linear signal extends to the superior and inferior articular surfaces. There is some blunting of the free edge. The body of the meniscus is displaced peripherally. There is some blunting of the free edge. There is curvilinear signal contacting the inferior articular surface which could be artifactual. The anterior horn also demonstrates oblique  linear signal extending to the inferior articular surface which is more likely artifactual. There is some irregularity at the free edge.   The lateral meniscus demonstrates some amorphous signal in the posterior horn. There is faint linear signal abutting the inferior articular surface at the inner 3rd of the posterior horn. There is no truncation of the meniscus.   The anterior cruciate ligament demonstrates amorphous intermediate intrasubstance signal throughout. There is no full-thickness discontinuity. There is intermediate intrasubstance signal in the proximal 2/3 of the posterior cruciate ligament as well. There is no focal or full-thickness discontinuity. There is some intermediate intrasubstance signal in the proximal fibular collateral ligament. There is no focal or full-thickness discontinuity. The iliotibial band and biceps femoris tendon are intact. The popliteus tendon is intact. There is mild edema in the popliteus muscle. The medial collateral ligament, extensor complex, and patellar retinacula are intact.       Moderate-sized joint effusion with some complex signal in the suprapatellar bursa. There is nonspecific joint fluid and possibly coexisting blood product. 2 round low signal foci are identified as well measuring 11 and 5 mm in diameter. These findings could be related to calcified loose bodies or air. There is some ill-defined fluid and edema in Hoffa's fat. The patient gives history of septic arthritis with surgical irrigation of the joint.   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 There is superimposed complex tearing of the posterior horn of the medial meniscus. There is minor subchondral bone marrow signal across the joint which is more likely degenerative in nature. There is no overt bone marrow edema or bone destruction to suggest osteomyelitis.   Small linear tear of the posterior horn of the lateral meniscus to the inferior articular surface.    Ill-defined fluid and edema in the subcutaneous fat circumferentially is nonspecific. There is no abscess. There is also nonspecific fluid extending in the intermuscular fat planes of the proximal calf.   Intermediate intrasubstance signal within the cruciate ligaments and proximal fibular collateral ligament could be related to mucoid degeneration or previous surgical intervention. There is no full-thickness disruption of the structures.   The study is limited by motion degradation.   Signed by: Dhaval Vazquez 1/24/2024 10:48 AM Dictation workstation:   [id number]    MR shoulder left w and wo IV contrast    Result Date: 1/23/2024  Interpreted By:  Dhaval Vazquez, STUDY: MR SHOULDER LEFT W AND WO IV CONTRAST;  1/23/2024 1:26 pm   INDICATION: Signs/Symptoms:possible sc joint infection. History of septic arthritis of left knee. Pain in left sternoclavicular joint.   COMPARISON: None.   ACCESSION NUMBER(S): [id number]   ORDERING CLINICIAN: RALEIGH SALINAS   TECHNIQUE: Multiplanar and multisequential MR images of the left sternoclavicular joint are performed. The study is supplemented with 3 plane post gadolinium fat saturated T1 weighted sequences. 15 cc of intravenous Dotarem is utilized.   FINDINGS: There is fluid signal intensity within the left 1st costochondral joint. There is minimal bone marrow edema at the adjacent articular surfaces. There is no overt bone destruction. There is ill-defined surrounding edema and enhancement extending into the left pectoralis muscles. Soft tissue edema extends along the chest wall between the 1st and 2nd and to a lesser extent the 2nd and 3rd rib. There is some edema extending deep to the joint within the superior mediastinal fat. No fluid collection is identified at this site.   There is minimal subarticular edema across the left 1st sternoclavicular joint. There is no fluid accumulation in the joint or widening of the joint space. There is no bone erosion. There is  mild surrounding soft tissue edema.   There is mild edema within the posterior 2nd rib best appreciated on sagittal images.   There is trace fluid along the posterior pleural surface of the upper thorax.       Focal fluid signal within the left 1st costochondral joint. There is surrounding soft tissue edema and enhancement with some ill-defined fluid. There is no soft tissue abscess. There is no overt bone destruction. These findings are suspicious for septic arthritis.   Minimal edema across the left sternoclavicular joint. There is no fluid in the joint space or widening of the joint. There is mild surrounding soft tissue edema.   Bone marrow edema in the posterior left rib could be reactive is of uncertain significance. There is no bone destruction or fracture.   Small volume left pleural effusion.   Signed by: Dhaval Vazquez 1/23/2024 3:43 PM Dictation workstation:   [id number]    XR shoulder left 2+ views    Result Date: 1/22/2024  Interpreted By:  Cali Smalls, STUDY: XR SHOULDER LEFT 2+ VIEWS; ;  1/22/2024 4:42 pm   INDICATION: Signs/Symptoms:Left shoulder tenderness with palation and reproducible with movement.   COMPARISON: None.   ACCESSION NUMBER(S): [id number]   ORDERING CLINICIAN: JALEN ABRAHAM   FINDINGS: No acute fracture or dislocation. No significant soft tissue swelling. Mild acromioclavicular joint osteoarthritis.       No acute osseous abnormality.   Signed by: Cali Smalls 1/22/2024 5:01 PM Dictation workstation:   [id number]    XR knee 4+ views bilateral    Result Date: 1/15/2024  Interpreted By:  German Hughes III, STUDY: XR KNEE 4+ VIEWS BILATERAL; ;  1/15/2024 2:58 pm   INDICATION: Signs/Symptoms:pain.   COMPARISON: None.   ACCESSION NUMBER(S): [id number]   ORDERING CLINICIAN: GERMAN HUGHES   FINDINGS: Bilateral weight-bearing views knees: Severe osteophytosis sclerosis joint space narrowing the medial compartment as well as about the patellofemoral space. There is  osteophytosis and sclerosis as well as subchondral cystic change consistent with primary arthritis. No acute osseous abnormality no fracture or dislocation appreciated       Severe bilateral knee arthritis     MACRO: None   Signed by: Florentin Marti III 1/15/2024 5:09 PM Dictation workstation:   [id number]        Assessment/Plan        # Left Knee Arthritis w/ Septic Arthritis  I&D procedure / Arthoscopic I&D x2   Admit to Orthopedics Team   Hospitalist team Consulted   DVTp and Pain management per Ortho   PT/OT evaluation  and treatment   CBC/BMP as appropriate, review results  Pulmonary Toileting   Follow up as needed outpatient with Orthopedics      # Diabetes Mellitus Type 2  Episode of Hypoglycemia - asymptomatic - improved  and stable   Diet Cardiac/Diabetic - changed to Regular, poor appetite while inpatient and may be cause of hypoglycemia - acute   Continue Sliding Scale SSI AC/HS   A1C 15 12/2023  Encourage healthy lifestyle changes  We will continue to monitor, may need to adjust medications based on glucose readings     # HTN / HLD  / Tachycardia  Continue home mediations as appropriate  Denies significant cardiac history  Hold Lisinopril at this time   Pt tachycardic this AM with fever. Will add Telemetry  IVF bolus Normal Saline       #GERD  Continue PPI  Stay upright for 30minutes  Take pain medications with food     # Acute Left Shoulder Pain - Probable Septic Arthritis  MRI :  Impression:     Focal fluid signal within the left 1st costochondral joint. There is  surrounding soft tissue edema and enhancement with some ill-defined  fluid. There is no soft tissue abscess. There is no overt bone  destruction. These findings are suspicious for septic arthritis.      Minimal edema across the left sternoclavicular joint. There is no  fluid in the joint space or widening of the joint. There is mild  surrounding soft tissue edema.      Bone marrow edema in the posterior left rib could be reactive is  of  uncertain significance. There is no bone destruction or fracture.      Small volume left pleural effusion.     # Small Pleural Effusion   Pulmonology Consult per Ortho  Review imaging for potential thoracentesis   Remains on RA  No adventitious breath sounds heard       Thank you for consult  Hemodynamically stable    Diabetic Diet  FULL CODE  DVTp: Defer to Orthopedics / Lovenox      Discussed findings with Ortho NP. Defer tenderness to left shoulder to Orthopedics Team      Hemodynamically stable      Time spent  50  minutes obtaining labs, imaging, recommendations, interview, assessment, examination, medication review/ordering, and EMR review.     Plan of care was discussed extensively with patient, RN and family at bedside. Patient verbalized understanding through teach back method. All questions and concerns addressed upon examination. Discussed findings with Dr. Garcia.      Of note, this documentation is completed using the Dragon Dictation system (voice recognition software). There may be spelling and/or grammatical errors that were not corrected prior to final submission.     Principal Problem:    Arthritis due to other bacteria, left knee (CMS/HCC)  Active Problems:    Arthritis, septic (CMS/HCC)         Marilu Aviles, APRN-CNP        Principal Problem:    Arthritis due to other bacteria, left knee (CMS/HCC)  Active Problems:    Arthritis, septic (CMS/HCC)          Marilu Aviles, APRN-CNP

## 2024-01-25 NOTE — SIGNIFICANT EVENT
Medicine     Orthopedics Team requests that Patient be transferred to Hospitalist Medicine as Primary.   Dr. Garcia is accepting. Ortho will stay on consult  Discussed case and findings with Dr. Gonzalez.     Further recommendations per attending as needed.   Family updated on transfer of care. Voice no concerns at this time.      01/25/24 at 12:08 PM - SOLEDAD Chahal-CNP

## 2024-01-25 NOTE — CARE PLAN
POC /goal update pts changed to WBAT L LE .   Problem: PT Problem  Goal: Pt will demonstrate mod I with bed mobility to edge of bed.    Outcome: Progressing  Goal: Pt will demonstrate mod I  with sit to stand/chair transfers with FWW.    Outcome: Progressing  Goal: Pt to demo improved BLE strength by being able to complete supine/seated thera ex 2x20 BLEs with 4 or less rest breaks .    Outcome: Progressing  Goal: Pt will ambulate 100 feet with FWW mod I WBAT L LE .  Outcome: Progressing

## 2024-01-25 NOTE — CARE PLAN
Problem: Fall/Injury  Goal: Verbalize understanding of risk factor reduction measures to prevent injury from fall in the home  Outcome: Progressing  Goal: Pace activities to prevent fatigue by end of the shift  Outcome: Progressing     Problem: Pain  Goal: Takes deep breaths with improved pain control throughout the shift  Outcome: Progressing  Goal: Turns in bed with improved pain control throughout the shift  Outcome: Progressing  Goal: Walks with improved pain control throughout the shift  Outcome: Progressing  Goal: Performs ADL's with improved pain control throughout shift  Outcome: Progressing  Goal: Participates in PT with improved pain control throughout the shift  Outcome: Progressing  Goal: Free from opioid side effects throughout the shift  Outcome: Progressing  Goal: Free from acute confusion related to pain meds throughout the shift  Outcome: Progressing     Problem: Safety - Adult  Goal: Free from fall injury  Outcome: Progressing     Problem: Discharge Planning  Goal: Discharge to home or other facility with appropriate resources  Outcome: Progressing     Problem: Chronic Conditions and Co-morbidities  Goal: Patient's chronic conditions and co-morbidity symptoms are monitored and maintained or improved  Outcome: Progressing     Problem: Diabetes  Goal: Achieve decreasing blood glucose levels by end of shift  Outcome: Progressing  Goal: Increase stability of blood glucose readings by end of shift  Outcome: Progressing  Goal: Decrease in ketones present in urine by end of shift  Outcome: Progressing  Goal: Maintain electrolyte levels within acceptable range throughout shift  Outcome: Progressing  Goal: Maintain glucose levels >70mg/dl to <250mg/dl throughout shift  Outcome: Progressing  Goal: No changes in neurological exam by end of shift  Outcome: Progressing  Goal: Learn about and adhere to nutrition recommendations by end of shift  Outcome: Progressing  Goal: Vital signs within normal range for  age by end of shift  Outcome: Progressing  Goal: Increase self care and/or family involovement by end of shift  Outcome: Progressing  Goal: Receive DSME education by end of shift  Outcome: Progressing

## 2024-01-26 ENCOUNTER — APPOINTMENT (OUTPATIENT)
Dept: RADIOLOGY | Facility: HOSPITAL | Age: 64
DRG: 856 | End: 2024-01-26
Payer: COMMERCIAL

## 2024-01-26 LAB
ANION GAP SERPL CALC-SCNC: 16 MMOL/L (ref 10–20)
BUN SERPL-MCNC: 24 MG/DL (ref 6–23)
CALCIUM SERPL-MCNC: 8.4 MG/DL (ref 8.6–10.3)
CHLORIDE SERPL-SCNC: 98 MMOL/L (ref 98–107)
CO2 SERPL-SCNC: 21 MMOL/L (ref 21–32)
CREAT SERPL-MCNC: 1.19 MG/DL (ref 0.5–1.3)
EGFRCR SERPLBLD CKD-EPI 2021: 69 ML/MIN/1.73M*2
ERYTHROCYTE [DISTWIDTH] IN BLOOD BY AUTOMATED COUNT: 13.5 % (ref 11.5–14.5)
GLUCOSE BLD MANUAL STRIP-MCNC: 116 MG/DL (ref 74–99)
GLUCOSE BLD MANUAL STRIP-MCNC: 129 MG/DL (ref 74–99)
GLUCOSE BLD MANUAL STRIP-MCNC: 161 MG/DL (ref 74–99)
GLUCOSE BLD MANUAL STRIP-MCNC: 176 MG/DL (ref 74–99)
GLUCOSE SERPL-MCNC: 113 MG/DL (ref 74–99)
HCT VFR BLD AUTO: 40 % (ref 41–52)
HGB BLD-MCNC: 13.4 G/DL (ref 13.5–17.5)
MCH RBC QN AUTO: 28.2 PG (ref 26–34)
MCHC RBC AUTO-ENTMCNC: 33.5 G/DL (ref 32–36)
MCV RBC AUTO: 84 FL (ref 80–100)
NRBC BLD-RTO: 0 /100 WBCS (ref 0–0)
PLATELET # BLD AUTO: 190 X10*3/UL (ref 150–450)
POTASSIUM SERPL-SCNC: 4.2 MMOL/L (ref 3.5–5.3)
PROCALCITONIN SERPL-MCNC: 1.06 NG/ML
RBC # BLD AUTO: 4.76 X10*6/UL (ref 4.5–5.9)
SODIUM SERPL-SCNC: 131 MMOL/L (ref 136–145)
VANCOMYCIN SERPL-MCNC: 14.1 UG/ML (ref 5–20)
WBC # BLD AUTO: 9.3 X10*3/UL (ref 4.4–11.3)

## 2024-01-26 PROCEDURE — 97535 SELF CARE MNGMENT TRAINING: CPT | Mod: GO,CO

## 2024-01-26 PROCEDURE — 2500000004 HC RX 250 GENERAL PHARMACY W/ HCPCS (ALT 636 FOR OP/ED): Performed by: NURSE PRACTITIONER

## 2024-01-26 PROCEDURE — 84145 PROCALCITONIN (PCT): CPT | Mod: ELYLAB | Performed by: STUDENT IN AN ORGANIZED HEALTH CARE EDUCATION/TRAINING PROGRAM

## 2024-01-26 PROCEDURE — 2500000004 HC RX 250 GENERAL PHARMACY W/ HCPCS (ALT 636 FOR OP/ED)

## 2024-01-26 PROCEDURE — 1200000002 HC GENERAL ROOM WITH TELEMETRY DAILY

## 2024-01-26 PROCEDURE — 2500000001 HC RX 250 WO HCPCS SELF ADMINISTERED DRUGS (ALT 637 FOR MEDICARE OP): Performed by: NURSE PRACTITIONER

## 2024-01-26 PROCEDURE — 2500000001 HC RX 250 WO HCPCS SELF ADMINISTERED DRUGS (ALT 637 FOR MEDICARE OP)

## 2024-01-26 PROCEDURE — 2500000004 HC RX 250 GENERAL PHARMACY W/ HCPCS (ALT 636 FOR OP/ED): Performed by: REGISTERED NURSE

## 2024-01-26 PROCEDURE — 99232 SBSQ HOSP IP/OBS MODERATE 35: CPT | Performed by: REGISTERED NURSE

## 2024-01-26 PROCEDURE — 80202 ASSAY OF VANCOMYCIN: CPT

## 2024-01-26 PROCEDURE — 77012 CT SCAN FOR NEEDLE BIOPSY: CPT | Performed by: RADIOLOGY

## 2024-01-26 PROCEDURE — 80048 BASIC METABOLIC PNL TOTAL CA: CPT | Performed by: STUDENT IN AN ORGANIZED HEALTH CARE EDUCATION/TRAINING PROGRAM

## 2024-01-26 PROCEDURE — 10160 PNXR ASPIR ABSC HMTMA BULLA: CPT | Performed by: RADIOLOGY

## 2024-01-26 PROCEDURE — 36415 COLL VENOUS BLD VENIPUNCTURE: CPT

## 2024-01-26 PROCEDURE — 71045 X-RAY EXAM CHEST 1 VIEW: CPT

## 2024-01-26 PROCEDURE — 10009 FNA BX W/CT GDN 1ST LES: CPT

## 2024-01-26 PROCEDURE — 97530 THERAPEUTIC ACTIVITIES: CPT | Mod: GP,CQ

## 2024-01-26 PROCEDURE — 82947 ASSAY GLUCOSE BLOOD QUANT: CPT

## 2024-01-26 PROCEDURE — 71045 X-RAY EXAM CHEST 1 VIEW: CPT | Performed by: RADIOLOGY

## 2024-01-26 PROCEDURE — 36415 COLL VENOUS BLD VENIPUNCTURE: CPT | Performed by: STUDENT IN AN ORGANIZED HEALTH CARE EDUCATION/TRAINING PROGRAM

## 2024-01-26 PROCEDURE — 85027 COMPLETE CBC AUTOMATED: CPT | Performed by: STUDENT IN AN ORGANIZED HEALTH CARE EDUCATION/TRAINING PROGRAM

## 2024-01-26 PROCEDURE — 2500000004 HC RX 250 GENERAL PHARMACY W/ HCPCS (ALT 636 FOR OP/ED): Performed by: INTERNAL MEDICINE

## 2024-01-26 PROCEDURE — 2500000005 HC RX 250 GENERAL PHARMACY W/O HCPCS: Performed by: RADIOLOGY

## 2024-01-26 RX ORDER — LIDOCAINE HYDROCHLORIDE 20 MG/ML
INJECTION, SOLUTION EPIDURAL; INFILTRATION; INTRACAUDAL; PERINEURAL
Status: COMPLETED | OUTPATIENT
Start: 2024-01-26 | End: 2024-01-26

## 2024-01-26 RX ORDER — SODIUM CHLORIDE 9 MG/ML
50 INJECTION, SOLUTION INTRAVENOUS CONTINUOUS
Status: DISCONTINUED | OUTPATIENT
Start: 2024-01-26 | End: 2024-01-27

## 2024-01-26 RX ORDER — SODIUM CHLORIDE 9 MG/ML
75 INJECTION, SOLUTION INTRAVENOUS CONTINUOUS
Status: DISCONTINUED | OUTPATIENT
Start: 2024-01-26 | End: 2024-01-26

## 2024-01-26 RX ADMIN — CEFEPIME 2 G: 2 INJECTION, POWDER, FOR SOLUTION INTRAVENOUS at 06:30

## 2024-01-26 RX ADMIN — OXYCODONE HYDROCHLORIDE 10 MG: 5 TABLET ORAL at 07:41

## 2024-01-26 RX ADMIN — SODIUM CHLORIDE 50 ML/HR: 9 INJECTION, SOLUTION INTRAVENOUS at 14:29

## 2024-01-26 RX ADMIN — ENOXAPARIN SODIUM 40 MG: 40 INJECTION SUBCUTANEOUS at 11:25

## 2024-01-26 RX ADMIN — VANCOMYCIN HYDROCHLORIDE 750 MG: 750 INJECTION, SOLUTION INTRAVENOUS at 04:00

## 2024-01-26 RX ADMIN — VANCOMYCIN HYDROCHLORIDE 750 MG: 750 INJECTION, SOLUTION INTRAVENOUS at 16:39

## 2024-01-26 RX ADMIN — FAMOTIDINE 20 MG: 20 TABLET, FILM COATED ORAL at 08:43

## 2024-01-26 RX ADMIN — FAMOTIDINE 20 MG: 20 TABLET, FILM COATED ORAL at 21:12

## 2024-01-26 RX ADMIN — DOCUSATE SODIUM 100 MG: 100 CAPSULE, LIQUID FILLED ORAL at 08:43

## 2024-01-26 RX ADMIN — CEFEPIME 2 G: 2 INJECTION, POWDER, FOR SOLUTION INTRAVENOUS at 14:30

## 2024-01-26 RX ADMIN — INSULIN LISPRO 1 UNITS: 100 INJECTION, SOLUTION INTRAVENOUS; SUBCUTANEOUS at 16:42

## 2024-01-26 RX ADMIN — CEFEPIME 2 G: 2 INJECTION, POWDER, FOR SOLUTION INTRAVENOUS at 21:13

## 2024-01-26 RX ADMIN — LIDOCAINE HYDROCHLORIDE 5 ML: 20 INJECTION, SOLUTION EPIDURAL; INFILTRATION; INTRACAUDAL; PERINEURAL at 10:31

## 2024-01-26 RX ADMIN — ROSUVASTATIN CALCIUM 10 MG: 10 TABLET, FILM COATED ORAL at 21:12

## 2024-01-26 RX ADMIN — DOCUSATE SODIUM 100 MG: 100 CAPSULE, LIQUID FILLED ORAL at 21:12

## 2024-01-26 ASSESSMENT — COGNITIVE AND FUNCTIONAL STATUS - GENERAL
HELP NEEDED FOR BATHING: A LOT
DAILY ACTIVITIY SCORE: 16
MOVING FROM LYING ON BACK TO SITTING ON SIDE OF FLAT BED WITH BEDRAILS: A LITTLE
DAILY ACTIVITIY SCORE: 17
PERSONAL GROOMING: A LITTLE
MOVING TO AND FROM BED TO CHAIR: A LOT
STANDING UP FROM CHAIR USING ARMS: A LOT
CLIMB 3 TO 5 STEPS WITH RAILING: TOTAL
MOVING FROM LYING ON BACK TO SITTING ON SIDE OF FLAT BED WITH BEDRAILS: A LITTLE
TOILETING: A LOT
HELP NEEDED FOR BATHING: A LOT
DRESSING REGULAR UPPER BODY CLOTHING: A LITTLE
STANDING UP FROM CHAIR USING ARMS: A LOT
WALKING IN HOSPITAL ROOM: A LOT
TURNING FROM BACK TO SIDE WHILE IN FLAT BAD: A LOT
TOILETING: A LITTLE
DRESSING REGULAR LOWER BODY CLOTHING: A LOT
PERSONAL GROOMING: A LITTLE
CLIMB 3 TO 5 STEPS WITH RAILING: A LOT
MOVING TO AND FROM BED TO CHAIR: A LITTLE
MOVING FROM LYING ON BACK TO SITTING ON SIDE OF FLAT BED WITH BEDRAILS: A LITTLE
DAILY ACTIVITIY SCORE: 17
PERSONAL GROOMING: A LITTLE
CLIMB 3 TO 5 STEPS WITH RAILING: TOTAL
DRESSING REGULAR UPPER BODY CLOTHING: A LITTLE
DRESSING REGULAR LOWER BODY CLOTHING: A LOT
MOBILITY SCORE: 14
DRESSING REGULAR LOWER BODY CLOTHING: A LOT
MOBILITY SCORE: 12
MOVING TO AND FROM BED TO CHAIR: A LOT
DRESSING REGULAR UPPER BODY CLOTHING: A LITTLE
WALKING IN HOSPITAL ROOM: A LITTLE
TURNING FROM BACK TO SIDE WHILE IN FLAT BAD: A LITTLE
TURNING FROM BACK TO SIDE WHILE IN FLAT BAD: A LOT
MOBILITY SCORE: 14
HELP NEEDED FOR BATHING: A LOT
WALKING IN HOSPITAL ROOM: A LOT
TOILETING: A LITTLE
STANDING UP FROM CHAIR USING ARMS: A LOT

## 2024-01-26 ASSESSMENT — PAIN SCALES - GENERAL
PAINLEVEL_OUTOF10: 3
PAINLEVEL_OUTOF10: 9
PAINLEVEL_OUTOF10: 7
PAINLEVEL_OUTOF10: 3
PAINLEVEL_OUTOF10: 3
PAINLEVEL_OUTOF10: 4

## 2024-01-26 ASSESSMENT — PAIN - FUNCTIONAL ASSESSMENT
PAIN_FUNCTIONAL_ASSESSMENT: 0-10

## 2024-01-26 ASSESSMENT — PAIN DESCRIPTION - DESCRIPTORS: DESCRIPTORS: ACHING;THROBBING

## 2024-01-26 ASSESSMENT — ACTIVITIES OF DAILY LIVING (ADL): HOME_MANAGEMENT_TIME_ENTRY: 23

## 2024-01-26 ASSESSMENT — PAIN DESCRIPTION - LOCATION: LOCATION: KNEE

## 2024-01-26 NOTE — PROGRESS NOTES
01/26/24 1023   Discharge Planning   Home or Post Acute Services Post acute facilities (Rehab/SNF/etc)   Type of Post Acute Facility Services Skilled nursing;Rehab   Patient expects to be discharged to: SNF     Revisited with pt/significant other regarding [employer] benefits for acute rehab. Pt states he has called his insurance company and was told would be responsible for 60% of costs and does not want to do that, pt not anticipates will need SNF instead. Pt/sign other provided SNF list which they will review for choices. Per rounding report with team, pt is going to IR today to tap fluid between ribs and joint, pt will require PICC /midline and long term IV antibiotics. DORIE Cain notified. CT team will continue to monitor case for progression and DC planning.    Update: Revisited with pt/family, SNF choices are #1 [facility], and #2 [facility]. Referral built and sent to SNF [facility], awaiting acceptance. DORIE Cain notified.

## 2024-01-26 NOTE — PROGRESS NOTES
Melchor Bergeron is a 63 y.o. male on day 6 of admission presenting with Arthritis due to other bacteria, left knee (CMS/HCC).      Subjective   Patient examined and seen. Alert and oriented x3, resting comfortably.  Patient denies chest pain, shortness of breath, palpitations, abdominal pain, fever or chills.  He states that he does feel weak when attempting to walk, but he has no other complaints.     Objective    PICC Line/Midline - when cultures are back and IV antibiotic finalized    Orthopedics request patient to be transferred down town due to complexity of case.  At this time - Orthopedics at [facility] [place] declines transfer     Echo - Endocarditis does not appear to be differential / LVEF 60-65%      Infectious Disease is following ABX per ID    Repeat CXR 1/26 - no significant Pleural effusion, doubt empyema Followed by Pulmonology       Last Recorded Vitals  /81 (BP Location: Left arm, Patient Position: Sitting)   Pulse 105   Temp 36.4 °C (97.5 °F) (Temporal)   Resp 16   Wt 75.1 kg (165 lb 9.1 oz)   SpO2 98%   Intake/Output last 3 Shifts:    Intake/Output Summary (Last 24 hours) at 1/26/2024 0917  Last data filed at 1/26/2024 0915  Gross per 24 hour   Intake 3762.49 ml   Output 1500 ml   Net 2262.49 ml       Admission Weight  Weight: 75.1 kg (165 lb 9.1 oz) (01/20/24 0635)    Daily Weight  01/20/24 : 75.1 kg (165 lb 9.1 oz)    Image Results  XR chest 1 view  Narrative: Interpreted By:  Scott Arzola,   STUDY:  XR CHEST 1 VIEW;  1/26/2024 5:45 am      INDICATION:  Signs/Symptoms:Evaluate Pleural Effusion.      COMPARISON:  None      ACCESSION NUMBER(S):  [id number]      ORDERING CLINICIAN:  NICOLE PADILLA      TECHNIQUE:  Single frontal view of the chest; Portable technique      FINDINGS:      LINES AND TUBES: n/a      HEART AND MEDIASTINUM:  Heart size: Within normal limits  Thoracic aorta: Within expected limits  [place] and nonvascular: within normal limits      PULMONARY VESSELS:  Within normal limits; no  sign of edema      LUNGS AND AIRWAYS:  Linear opacity near left lung base is probably scar or atelectasis.  Right midlung granulomas. No acute disease      PLEURA:  Effusion: No substantial pleural effusion on either side  Pneumothorax: No substantial pneumothorax on either side  Other: n/a      BONES:  No acute findings      Impression: NO ACUTE DISEASE IN THE CHEST INCLUDING NO SUBSTANTIAL SIZED PLEURAL  EFFUSION      MACRO:  None      Signed by: Scott Arzola 1/26/2024 7:02 AM  Dictation workstation:   [id number]      Physical Exam  Constitutional: Well developed, awake/alert/oriented x3, cooperative  Respiratory/Thorax: Patent airways,  normal breath sounds  Cardiovascular: Regular, rate and rhythm, no murmurs,   Musculoskeletal: decrease range of motion to left knee , area covered with ACE, Left shoulder tenderness with palpitation and reproducible, left clavical/ S.C joint tenderness with palpation , mild edema to LLE to be expected   Extremities: normal extremities,  incision covered with ACE   Skin: warm, dry, intact except as ntoed   Neurological: alert/oriented x 3, speech clear,     Psychiatric: appropriate mood and behavior      Relevant Results    Scheduled medications  cefepime, 2 g, intravenous, q8h  docusate sodium, 100 mg, oral, BID  enoxaparin, 40 mg, subcutaneous, Daily  [Held by provider] ertugliflozin, 15 mg, oral, Daily  famotidine, 20 mg, oral, q12h  insulin lispro, 0-5 Units, subcutaneous, Before meals & nightly  [Held by provider] lisinopril, 10 mg, oral, Daily  rosuvastatin, 10 mg, oral, Nightly  [Held by provider] semaglutide, 3 mg, oral, Daily before breakfast  vancomycin, 750 mg, intravenous, q12h      Continuous medications  sodium chloride 0.9%, 50 mL/hr, Last Rate: 50 mL/hr (01/26/24 0915)      PRN medications  PRN medications: acetaminophen, cyclobenzaprine, dextrose 10 % in water (D10W), dextrose, glucagon, lidocaine, morphine, naloxone, ondansetron ODT **OR** ondansetron,  oxyCODONE, oxyCODONE    Results for orders placed or performed during the hospital encounter of 01/20/24 (from the past 24 hour(s))   ECG 12 lead   Result Value Ref Range    Ventricular Rate 124 BPM    Atrial Rate 124 BPM    AZ Interval 136 ms    QRS Duration 82 ms    QT Interval 332 ms    QTC Calculation(Bazett) 476 ms    P Axis 73 degrees    R Axis -3 degrees    T Axis 66 degrees    QRS Count 20 beats    Q Onset 212 ms    P Onset 144 ms    P Offset 195 ms    T Offset 378 ms    QTC Fredericia 422 ms   POCT GLUCOSE   Result Value Ref Range    POCT Glucose 126 (H) 74 - 99 mg/dL   Lactate   Result Value Ref Range    Lactate 0.8 0.4 - 2.0 mmol/L   Blood Culture    Specimen: Peripheral Venipuncture; Blood culture   Result Value Ref Range    Blood Culture Loaded on Instrument - Culture in progress    Blood Culture    Specimen: Peripheral Venipuncture; Blood culture   Result Value Ref Range    Blood Culture Loaded on Instrument - Culture in progress    POCT GLUCOSE   Result Value Ref Range    POCT Glucose 149 (H) 74 - 99 mg/dL   Urinalysis with Reflex Culture and Microscopic   Result Value Ref Range    Color, Urine Straw Straw, Yellow    Appearance, Urine Clear Clear    Specific Gravity, Urine 1.013 1.005 - 1.035    pH, Urine 5.0 5.0, 5.5, 6.0, 6.5, 7.0, 7.5, 8.0    Protein, Urine NEGATIVE NEGATIVE mg/dL    Glucose, Urine >=500 (3+) (A) NEGATIVE mg/dL    Blood, Urine SMALL (1+) (A) NEGATIVE    Ketones, Urine 20 (1+) (A) NEGATIVE mg/dL    Bilirubin, Urine NEGATIVE NEGATIVE    Urobilinogen, Urine <2.0 <2.0 mg/dL    Nitrite, Urine NEGATIVE NEGATIVE    Leukocyte Esterase, Urine NEGATIVE NEGATIVE   Urinalysis Microscopic   Result Value Ref Range    WBC, Urine 1-5 1-5, NONE /HPF    RBC, Urine NONE NONE, 1-2, 3-5 /HPF    Mucus, Urine 1+ Reference range not established. /LPF   POCT GLUCOSE   Result Value Ref Range    POCT Glucose 156 (H) 74 - 99 mg/dL   Vancomycin   Result Value Ref Range    Vancomycin 9.0 5.0 - 20.0 ug/mL   CBC    Result Value Ref Range    WBC 9.3 4.4 - 11.3 x10*3/uL    nRBC 0.0 0.0 - 0.0 /100 WBCs    RBC 4.76 4.50 - 5.90 x10*6/uL    Hemoglobin 13.4 (L) 13.5 - 17.5 g/dL    Hematocrit 40.0 (L) 41.0 - 52.0 %    MCV 84 80 - 100 fL    MCH 28.2 26.0 - 34.0 pg    MCHC 33.5 32.0 - 36.0 g/dL    RDW 13.5 11.5 - 14.5 %    Platelets 190 150 - 450 x10*3/uL   Basic metabolic panel   Result Value Ref Range    Glucose 113 (H) 74 - 99 mg/dL    Sodium 131 (L) 136 - 145 mmol/L    Potassium 4.2 3.5 - 5.3 mmol/L    Chloride 98 98 - 107 mmol/L    Bicarbonate 21 21 - 32 mmol/L    Anion Gap 16 10 - 20 mmol/L    Urea Nitrogen 24 (H) 6 - 23 mg/dL    Creatinine 1.19 0.50 - 1.30 mg/dL    eGFR 69 >60 mL/min/1.73m*2    Calcium 8.4 (L) 8.6 - 10.3 mg/dL   POCT GLUCOSE   Result Value Ref Range    POCT Glucose 129 (H) 74 - 99 mg/dL   Vancomycin   Result Value Ref Range    Vancomycin 14.1 5.0 - 20.0 ug/mL            Assessment/Plan        # Left Knee Arthritis w/ Septic Arthritis  I&D procedure / Arthoscopic I&D x2   Admit to Orthopedics - Transfer to Hospital Medicine   DVTp and Pain management per Ortho   PT/OT evaluation  and treatment   CBC/BMP as appropriate, review results  Pulmonary Toileting   Follow up as needed outpatient with Orthopedics      # Diabetes Mellitus Type 2  Episode of Hypoglycemia - asymptomatic - improved  and stable   Diet Cardiac/Diabetic - changed to Regular, poor appetite while inpatient and may be cause of hypoglycemia - acute   Continue Sliding Scale SSI AC/HS   A1C 15 12/2023  Encourage healthy lifestyle changes     # HTN / HLD  / Tachycardia  Continue home mediations as appropriate  Denies significant cardiac history  Hold Lisinopril at this time   Telemetry for arrhythmia monitoring    NS maintenance fluids 50ml/hr       #GERD  Continue PPI  Stay upright for 30minutes  Take pain medications with food     # Acute Left Shoulder Pain - Probable Septic Arthritis  MRI :  Impression:     Focal fluid signal within the left 1st  costochondral joint. There is  surrounding soft tissue edema and enhancement with some ill-defined  fluid. There is no soft tissue abscess. There is no overt bone  destruction. These findings are suspicious for septic arthritis.      Minimal edema across the left sternoclavicular joint. There is no  fluid in the joint space or widening of the joint. There is mild  surrounding soft tissue edema.      Bone marrow edema in the posterior left rib could be reactive is of  uncertain significance. There is no bone destruction or fracture.      Small volume left pleural effusion.      IR Procedure Pending - Will attempt aspirate     # Small Pleural Effusion   Pulmonology Consult per Ortho  Review imaging for potential thoracentesis   Remains on RA  No adventitious breath sounds heard   Repeat CXR - negative for significant Pleural effusion  no acute disease of the chest   If patient becomes, hypoxia further exploration will be warranted         Thank you for consult  Hemodynamically stable     Diabetic Diet  FULL CODE  DVTp: Defer to Orthopedics / Lovenox   PICC /Midline when cultures finalized with ABX - possible Monday 1/29        Discussed findings with Ortho NP. Defer tenderness to left shoulder to Orthopedics Team      Hemodynamically stable      Time spent  36  minutes obtaining labs, imaging, recommendations, interview, assessment, examination, medication review/ordering, and EMR review.     Plan of care was discussed extensively with patient, RN and family at bedside. Patient verbalized understanding through teach back method. All questions and concerns addressed upon examination. Discussed findings with Dr. Garcia.      Of note, this documentation is completed using the Dragon Dictation system (voice recognition software). There may be spelling and/or grammatical errors that were not corrected prior to final submission.      Principal Problem:    Arthritis due to other bacteria, left knee (CMS/HCC)  Active  Problems:    Arthritis, septic (CMS/HCC)      Marilu Aviles, APRN-CNP

## 2024-01-26 NOTE — PROGRESS NOTES
DAILY PROGRESS NOTE      POD 2 left knee arthroscopic I/D.     Patient doing fairly well  Visit Vitals  /81 (BP Location: Left arm, Patient Position: Sitting)   Pulse 105   Temp 36.4 °C (97.5 °F) (Temporal)   Resp 16      Temp (24hrs), Av.1 °C (98.8 °F), Min:36.4 °C (97.5 °F), Max:38.2 °C (100.8 °F)       Pain Control fair  No chest pain or shortness of breath.  No calf pain    Exam:  Extremity shows neuro vascular status intact. Flexion and extension intact on extremity.  There is no palpable effusion appreciated about the left knee.   Calves soft and non-tender without evidence of DVT.                  Labs reviewed:     CBC:   Lab Results   Component Value Date    WBC 9.3 2024    WBC 8.2 2024    WBC 6.4 2024    HGB 13.4 (L) 2024    HGB 14.1 2024    HGB 14.1 2024       BMP:    Lab Results   Component Value Date     (L) 2024     (L) 2024     (L) 2024     (L) 2024    K 4.2 2024    K 4.5 2024    K 4.3 2024    K 4.3 2024    CL 98 2024     2024    CL 99 2024    CL 98 2024    CO2 21 2024    CO2 20 (L) 2024    CO2 25 2024    CO2 24 2024    BUN 24 (H) 2024    BUN 29 (H) 2024    BUN 27 (H) 2024    BUN 27 (H) 2024    CREATININE 1.19 2024    CREATININE 1.18 2024    CREATININE 1.23 2024    CREATININE 1.28 2024          I&O  I/O last 3 completed shifts:  In: 3551.7 (47.3 mL/kg) [P.O.:800; I.V.:2051.7 (27.3 mL/kg); IV Piggyback:700]  Out: 2550 (34 mL/kg) [Urine:2550 (0.9 mL/kg/hr)]  Weight: 75.1 kg       Assessment:    Postop day 2 arthroscopic I&D left knee.  Patient is no longer febrile.   Still continues to have pain to left knee primarily with ambulation, movement and weight bearing   Plan:   PT OT: Patient okay for weightbearing as tolerated to left knee and range of motion  Continue IV atb per ID.  Patient will require picc/midline placement prior to discharge for long-term antibiotic coverage   Continue pain control  Discharge planning: discharge disposition TBD.     SOLEDAD Dupree-CNP MD  1/26/2024 9:32 AM

## 2024-01-26 NOTE — PROGRESS NOTES
"Vancomycin Dosing by Pharmacy- FOLLOW UP    Melchor Bergeron is a 63 y.o. year old male who Pharmacy has been consulted for vancomycin dosing for osteomyelitis/septic arthritis. Based on the patient's indication and renal status this patient is being dosed based on a goal AUC of 400-600.     Renal function is currently stable.    Current vancomycin dose: 750 mg given every 12 hours    Estimated vancomycin AUC on current dose: 530 mg/L.hr     Visit Vitals  /84   Pulse 110   Temp 36.4 °C (97.5 °F) (Temporal)   Resp 16        Lab Results   Component Value Date    CREATININE 1.19 01/26/2024    CREATININE 1.18 01/25/2024    CREATININE 1.23 01/24/2024    CREATININE 1.28 01/24/2024        Patient weight is No results found for: \"PTWEIGHT\"    No results found for: \"CULTURE\"     I/O last 3 completed shifts:  In: 3551.7 (47.3 mL/kg) [P.O.:800; I.V.:2051.7 (27.3 mL/kg); IV Piggyback:700]  Out: 2550 (34 mL/kg) [Urine:2550 (0.9 mL/kg/hr)]  Weight: 75.1 kg   [unfilled]    No results found for: \"PATIENTTEMP\"     Assessment/Plan    Within goal AUC range. Continue current vancomycin regimen.    This dosing regimen is predicted by [employer] to result in the following pharmacokinetic parameters:  <Regimen: 750 mg IV every 12 hours.  Start time: 16:00 on 01/26/2024  Exposure target: AUC24 (range)400-600 mg/L.hr   AUC24,ss: 530 mg/L.hr  Probability of AUC24 > 400: 91 %  Ctrough,ss: 17.4 mg/L  Probability of Ctrough,ss > 20: 31 %  Probability of nephrotoxicity (Lodise CARMEL 2009): 13 %    The next level will be obtained on 01/28/2024 at 1000. May be obtained sooner if clinically indicated.   Will continue to monitor renal function daily while on vancomycin and order serum creatinine at least every 48 hours if not already ordered.  Follow for continued vancomycin needs, clinical response, and signs/symptoms of toxicity.       ENID Purdy Ph.            "

## 2024-01-26 NOTE — CONSULTS
Melchor Bergeron is a 63 y.o. male on day 5 of admission presenting with Arthritis due to other bacteria, left knee (CMS/[facility]).      Chart reviewed, patient examined and obtain further history from the patient.  Patient denies any history of smoking, bronchial asthma or COPD.  Denies prior pleural effusions.  He has no history of obstructive sleep apnea or insomnia.  He is well functional working until shortly before this admission.  He did not have any cough, sputum production, wheezing, hemoptysis or any other lung related symptoms.  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  He has also developed shoulder pain and suspected to have clavicular joint infection.  Patient is noted to have a small left pleural effusion on admission chest x-ray there is concern for infected pleural fluid.  I was asked to evaluate and follow from a pulmonary perspective.     Subjective   Patient examined and seen. Alert and oriented x3, resting comfortably.  Patient denies chest pain, shortness of breath, palpitations, abdominal pain, fever or chills.    He still complains of Left Knee Pain. Currently ACE wrapped. He states overall he is feeling better. He still has left sternoclavicular joint pain.               Objective   Orthopedics request patient to be transferred down town due to complexity of case.  At this time - Orthopedics at [facility] declines transfer     Echo is pending for evaluation - see below results     Infectious Disease is following ABX per ID           Last Recorded Vitals  /83 (Patient Position: Lying)   Pulse (!) 124   Temp 37.1 °C (98.8 °F)   Resp 18   Wt 75.1 kg (165 lb 9.1 oz)   SpO2 95%   Intake/Output last 3 Shifts:     Intake/Output Summary (Last 24 hours) at 1/25/2024 1134  Last data filed at 1/25/2024 1059      Gross per 24 hour   Intake 1220 ml   Output 1050 ml   Net 170 ml         Admission Weight  Weight: 75.1 kg (165 lb 9.1 oz) (01/20/24 0635)     Daily  Weight  01/20/24 : 75.1 kg (165 lb 9.1 oz)     Image Results  Transthoracic Echo (TTE) Complete            [place] [id number]   Tel [phone] Fax [phone]     TRANSTHORACIC ECHOCARDIOGRAM REPORT        Patient Name:      AMARJIT COLLINS        Jackelin Physician:    [id number] Dhaval Wisdom   Study Date:        1/25/2024            Ordering Provider:    [id number] YULISSA DWYER  MRN/PID:           [id number]             Fellow:  Accession#:        [id number]         Nurse:                Tad Bernard RN  Date of Birth/Age: 1960 / 63 years Sonographer:          Felecia GASTELUM  Gender:            M                    Additional Staff:  Height:            175.26 cm            Admit Date:           1/19/2024  Weight:            74.84 kg             Admission Status:     Inpatient -                                                                Routine  BSA:               1.90 m2              Department Location:  [facility]                                                                Echo Lab  Blood Pressure: 149 /72 mmHg     Study Type:    TRANSTHORACIC ECHO (TTE) COMPLETE  Diagnosis/ICD: Sepsis due to other specified staphylococcus-A41.1  Indication:    SEPSIS, ENDOCARDITIS  CPT Codes:     Echo Complete w Full [id number]     Patient History:  Diabetes:          Yes  Pertinent History: HTN, Hyperlipidemia and Sepsis.     Study Detail: The following Echo studies were performed: 2D, M-Mode, Doppler and                color flow. Technically challenging study due to poor acoustic                windows and prominent lung artifact. Definity used as a contrast                agent for endocardial border definition. Total contrast used for                this procedure was 1.5  mL via IV push. Unable to obtain IVC view.                The patient was awake.        PHYSICIAN INTERPRETATION:  Left Ventricle: Left ventricular systolic function is normal, with an estimated ejection fraction of 60-65%. There are no regional wall motion abnormalities. The left ventricular cavity size is normal. There is mild concentric left ventricular hypertrophy. Spectral Doppler shows an impaired relaxation pattern of left ventricular diastolic filling.  LV Wall Scoring:  All segments are normal.     Left Atrium: The left atrium is normal in size.  Right Ventricle: The right ventricle is normal in size. There is normal right ventricular global systolic function.  Right Atrium: The right atrium is normal in size.  Aortic Valve: The aortic valve appears structurally normal. The aortic valve appears tricuspid. There is mild aortic valve cusp calcification. There is no evidence of aortic valve stenosis.  There is no evidence of aortic valve regurgitation. The peak instantaneous gradient of the aortic valve is 6.8 mmHg. The mean gradient of the aortic valve is 4.0 mmHg.  Mitral Valve: The mitral valve is normal in structure. There is no evidence of mitral valve stenosis. There is normal mitral valve leaflet mobility. There is mild mitral annular calcification. There is no evidence of mitral valve regurgitation.  Tricuspid Valve: The tricuspid valve is structurally normal. There is normal tricuspid valve leaflet mobility. There is trace tricuspid regurgitation.  Pulmonic Valve: The pulmonic valve is not well visualized. There is no indication of pulmonic valve regurgitation.  Pericardium: There is no pericardial effusion noted.  Aorta: The aortic root is normal.  Pulmonary Artery: The main pulmonary artery is normal in size, and position, with normal bifurcation into the left and right pulmonary arteries.  Systemic Veins: The inferior vena cava was not well visualized.  In comparison to the previous  echocardiogram(s): There are no prior studies on this patient for comparison purposes.        CONCLUSIONS:   1. Left ventricular systolic function is normal with a 60-65% estimated ejection fraction.   2. No definite valvular vegetations were visualized.   3. Spectral Doppler shows an impaired relaxation pattern of left ventricular diastolic filling.   4. There is no evidence of mitral valve stenosis.   5. No evidence of mitral valve regurgitation.   6. Trace tricuspid regurgitation is visualized.   7. Aortic valve stenosis is not present.   8. The main pulmonary artery is normal in size, and position, with normal bifurcation into the left and right pulmonary arteries.     RECOMMENDATIONS:  Technically suboptimal and limited study, therefore accuracy of above interpretation could be substantially diminished. Clinical correlation is advised. Consider additional imaging modalities if clinically indicated. Transthoracic echo has low negative predictive value for mass, vegetation, or embolic source. Consider KENYA or MRI if clinically indicated.     QUANTITATIVE DATA SUMMARY:  2D MEASUREMENTS:                           Normal Ranges:  Ao Root d:     3.30 cm   (2.0-3.7cm)  LAs:           3.10 cm   (2.7-4.0cm)  IVSd:          1.13 cm   (0.6-1.1cm)  LVPWd:         1.19 cm   (0.6-1.1cm)  LVIDd:         3.68 cm   (3.9-5.9cm)  LVIDs:         2.52 cm  LV Mass Index: 72.9 g/m2  LV % FS        31.5 %     LA VOLUME:                                Normal Ranges:  LA Vol A4C:        43.8 ml    (22+/-6mL/m2)  LA Vol A2C:        31.9 ml  LA Vol BP:         39.4 ml  LA Vol Index A4C:  23.0ml/m2  LA Vol Index A2C:  16.8 ml/m2  LA Vol Index BP:   20.7 ml/m2  LA Area A4C:       16.4 cm2  LA Area A2C:       13.3 cm2  LA Major Axis A4C: 5.2 cm  LA Major Axis A2C: 4.7 cm  LA Volume Index:   19.5 ml/m2  LA Vol A4C:        42.0 ml  LA Vol A2C:        30.0 ml     RA VOLUME BY A/L METHOD:                                Normal Ranges:  RA Vol A4C:         41.3 ml    (8.3-19.5ml)  RA Vol Index A4C:  21.7 ml/m2  RA Area A4C:       16.2 cm2  RA Major Axis A4C: 5.4 cm     AORTA MEASUREMENTS:                     Normal Ranges:  Asc Ao, d: 3.50 cm (2.1-3.4cm)     LV SYSTOLIC FUNCTION BY 2D PLANIMETRY (MOD):                      Normal Ranges:  EF-A4C View: 66.7 % (>=55%)  EF-A2C View: 67.1 %  EF-Biplane:  65.8 %     LV DIASTOLIC FUNCTION:                            Normal Ranges:  MV Peak E:     0.82 m/s   (0.7-1.2 m/s)  MV Peak A:     1.00 m/s   (0.42-0.7 m/s)  E/A Ratio:     0.82       (1.0-2.2)  MV lateral e'  0.11 m/s  MV medial e'   0.08 m/s  E/e' Ratio:    7.20       (<8.0)  PulmV Sys Mann: 54.10 cm/s     MITRAL VALVE:                 Normal Ranges:  MV DT: 82 msec (150-240msec)     AORTIC VALVE:                                    Normal Ranges:  AoV Vmax:                1.30 m/s (<=1.7m/s)  AoV Peak P.8 mmHg (<20mmHg)  AoV Mean P.0 mmHg (1.7-11.5mmHg)  LVOT Max Mann:            1.12 m/s (<=1.1m/s)  AoV VTI:                 21.10 cm (18-25cm)  LVOT VTI:                15.10 cm  LVOT Diameter:           2.00 cm  (1.8-2.4cm)  AoV Area, VTI:           2.25 cm2 (2.5-5.5cm2)  AoV Area,Vmax:           2.71 cm2 (2.5-4.5cm2)  AoV Dimensionless Index: 0.72        RIGHT VENTRICLE:  RV Basal 3.49 cm  RV Mid   2.56 cm  RV Major 7.4 cm  TAPSE:   28.7 mm  RV s'    0.23 m/s     TRICUSPID VALVE/RVSP:                              Normal Ranges:  Peak TR Velocity: 2.54 m/s  RV Syst Pressure: 28.8 mmHg (< 30mmHg)     PULMONIC VALVE:                          Normal Ranges:  PV Accel Time: 85 msec  (>120ms)  PV Max Mann:    1.3 m/s  (0.6-0.9m/s)  PV Max P.0 mmHg     Pulmonary Veins:  PulmV Sys Mann: 54.10 cm/s        [id number] Dhaval Wisdom DO  Electronically signed on 2024 at 11:14:32 AM        Wall Scoring        ** Final **  ECG 12 lead  Sinus tachycardia  Inferior infarct (cited on or before 2024)  Abnormal ECG  When compared with  ECG of 22-JAN-2024 15:47,  Nonspecific T wave abnormality no longer evident in Lateral leads        Physical Exam  Constitutional: Well developed, awake/alert/oriented x3, cooperative  Respiratory/Thorax: Patent airways,  normal breath sounds  Cardiovascular: Regular, rate and rhythm, no murmurs,   Musculoskeletal: decrease range of motion to left knee , area covered with ACE, Left shoulder tenderness with palpitation and reproducible, left clavical/ S.C joint tenderness with palpation   Extremities: normal extremities,  incision covered with ACE   Skin: warm, dry, intact except as ntoed   Neurological: alert/oriented x 3, speech clear,     Psychiatric: appropriate mood and behavior     Relevant Results  Scheduled medications  cefTRIAXone, 2 g, intravenous, q24h  docusate sodium, 100 mg, oral, BID  [Held by provider] enoxaparin, 40 mg, subcutaneous, Daily  [Held by provider] ertugliflozin, 15 mg, oral, Daily  famotidine, 20 mg, oral, q12h  insulin lispro, 0-5 Units, subcutaneous, Before meals & nightly  lisinopril, 10 mg, oral, Daily  oxychlorosene 4 Application in sodium chloride 0.9 % 1,000 mL infusion, 2 g, miscellaneous, Once  rosuvastatin, 10 mg, oral, Nightly  [Held by provider] semaglutide, 3 mg, oral, Daily before breakfast        Continuous medications  sodium chloride 0.9%, 100 mL/hr, Last Rate: 100 mL/hr (01/25/24 1059)        PRN medications  PRN medications: acetaminophen, cyclobenzaprine, dextrose 10 % in water (D10W), dextrose, glucagon, hydrALAZINE, lidocaine, morphine, naloxone, ondansetron ODT **OR** ondansetron, oxyCODONE, oxyCODONE           Results for orders placed or performed during the hospital encounter of 01/20/24 (from the past 24 hour(s))   Blood Culture     Specimen: Peripheral Venipuncture; Blood culture   Result Value Ref Range     Blood Culture Loaded on Instrument - Culture in progress     Blood Culture     Specimen: Peripheral Venipuncture; Blood culture   Result Value Ref Range      Blood Culture Loaded on Instrument - Culture in progress     POCT GLUCOSE   Result Value Ref Range     POCT Glucose 79 74 - 99 mg/dL   CBC   Result Value Ref Range     WBC 8.2 4.4 - 11.3 x10*3/uL     nRBC 0.0 0.0 - 0.0 /100 WBCs     RBC 5.06 4.50 - 5.90 x10*6/uL     Hemoglobin 14.1 13.5 - 17.5 g/dL     Hematocrit 42.5 41.0 - 52.0 %     MCV 84 80 - 100 fL     MCH 27.9 26.0 - 34.0 pg     MCHC 33.2 32.0 - 36.0 g/dL     RDW 13.7 11.5 - 14.5 %     Platelets 191 150 - 450 x10*3/uL   Basic metabolic panel   Result Value Ref Range     Glucose 105 (H) 74 - 99 mg/dL     Sodium 132 (L) 136 - 145 mmol/L     Potassium 4.5 3.5 - 5.3 mmol/L     Chloride 100 98 - 107 mmol/L     Bicarbonate 20 (L) 21 - 32 mmol/L     Anion Gap 17 10 - 20 mmol/L     Urea Nitrogen 29 (H) 6 - 23 mg/dL     Creatinine 1.18 0.50 - 1.30 mg/dL     eGFR 70 >60 mL/min/1.73m*2     Calcium 8.4 (L) 8.6 - 10.3 mg/dL   HIV 1/2 Antigen/Antibody Screen with Reflex to Confirmation   Result Value Ref Range     HIV 1/2 Antigen/Antibody Screen with Reflex to Confirmation Nonreactive Nonreactive   POCT GLUCOSE   Result Value Ref Range     POCT Glucose 122 (H) 74 - 99 mg/dL   Transthoracic Echo (TTE) Complete   Result Value Ref Range     AV mn grad 4.0 mmHg     AV pk domenico 1.30 m/s     LV biplane EF 66 %     LVOT diam 2.00 cm     MV E/A ratio 0.82       Tricuspid annular plane systolic excursion 2.9 cm     MV avg E/e' ratio 7.20       LA vol index A/L 20.7 ml/m2     RV free wall pk S' 22.80 cm/s     RVSP 28.8 mmHg     LVIDd 3.68 cm     Aortic Valve Area by Continuity of Peak Velocity 2.71 cm2     AV pk grad 6.8 mmHg     Aortic Valve Area by Continuity of VTI 2.25 cm2     LV A4C EF 66.7     ECG 12 lead   Result Value Ref Range     Ventricular Rate 124 BPM     Atrial Rate 124 BPM     WA Interval 136 ms     QRS Duration 82 ms     QT Interval 332 ms     QTC Calculation(Bazett) 476 ms     P Axis 73 degrees     R Axis -3 degrees     T Axis 66 degrees     QRS Count 20 beats     Q  Onset 212 ms     P Onset 144 ms     P Offset 195 ms     T Offset 378 ms     QTC Fredericia 422 ms   POCT GLUCOSE   Result Value Ref Range     POCT Glucose 126 (H) 74 - 99 mg/dL         Transthoracic Echo (TTE) Complete     Result Date: 1/25/2024          [place] [address]  Tel [phone] Fax [phone] TRANSTHORACIC ECHOCARDIOGRAM REPORT  Patient Name:      AMARJIT Benítez Physician:    [id number] Dhaval Wisdom DO Study Date:        1/25/2024            Ordering Provider:    [id number] DWYER MRN/PID:           [id number]             Fellow: Accession#:        [id number]         Nurse:                Tad Bernard RN Date of Birth/Age: 1960 / 63 years Sonographer:          Felecia GASTELUM Gender:            M                    Additional Staff: Height:            175.26 cm            Admit Date:           1/19/2024 Weight:            74.84 kg             Admission Status:     Inpatient -                                                               Routine BSA:               1.90 m2              Department Location:  [facility]                                                               Echo Lab Blood Pressure: 149 /72 mmHg Study Type:    TRANSTHORACIC ECHO (TTE) COMPLETE Diagnosis/ICD: Sepsis due to other specified staphylococcus-A41.1 Indication:    SEPSIS, ENDOCARDITIS CPT Codes:     Echo Complete w Full Doppler-[id number] Patient History: Diabetes:          Yes Pertinent History: HTN, Hyperlipidemia and Sepsis. Study Detail: The following Echo studies were performed: 2D, M-Mode, Doppler and               color flow. Technically challenging study due to poor acoustic               windows and prominent lung artifact. Definity used as a contrast                agent for endocardial border definition. Total contrast used for               this procedure was 1.5 mL via IV push. Unable to obtain IVC view.               The patient was awake.  PHYSICIAN INTERPRETATION: Left Ventricle: Left ventricular systolic function is normal, with an estimated ejection fraction of 60-65%. There are no regional wall motion abnormalities. The left ventricular cavity size is normal. There is mild concentric left ventricular hypertrophy. Spectral Doppler shows an impaired relaxation pattern of left ventricular diastolic filling. LV Wall Scoring: All segments are normal. Left Atrium: The left atrium is normal in size. Right Ventricle: The right ventricle is normal in size. There is normal right ventricular global systolic function. Right Atrium: The right atrium is normal in size. Aortic Valve: The aortic valve appears structurally normal. The aortic valve appears tricuspid. There is mild aortic valve cusp calcification. There is no evidence of aortic valve stenosis. There is no evidence of aortic valve regurgitation. The peak instantaneous gradient of the aortic valve is 6.8 mmHg. The mean gradient of the aortic valve is 4.0 mmHg. Mitral Valve: The mitral valve is normal in structure. There is no evidence of mitral valve stenosis. There is normal mitral valve leaflet mobility. There is mild mitral annular calcification. There is no evidence of mitral valve regurgitation. Tricuspid Valve: The tricuspid valve is structurally normal. There is normal tricuspid valve leaflet mobility. There is trace tricuspid regurgitation. Pulmonic Valve: The pulmonic valve is not well visualized. There is no indication of pulmonic valve regurgitation. Pericardium: There is no pericardial effusion noted. Aorta: The aortic root is normal. Pulmonary Artery: The main pulmonary artery is normal in size, and position, with normal bifurcation into the left and right pulmonary arteries. Systemic Veins: The inferior vena  cava was not well visualized. In comparison to the previous echocardiogram(s): There are no prior studies on this patient for comparison purposes.  CONCLUSIONS:  1. Left ventricular systolic function is normal with a 60-65% estimated ejection fraction.  2. No definite valvular vegetations were visualized.  3. Spectral Doppler shows an impaired relaxation pattern of left ventricular diastolic filling.  4. There is no evidence of mitral valve stenosis.  5. No evidence of mitral valve regurgitation.  6. Trace tricuspid regurgitation is visualized.  7. Aortic valve stenosis is not present.  8. The main pulmonary artery is normal in size, and position, with normal bifurcation into the left and right pulmonary arteries. RECOMMENDATIONS: Technically suboptimal and limited study, therefore accuracy of above interpretation could be substantially diminished. Clinical correlation is advised. Consider additional imaging modalities if clinically indicated. Transthoracic echo has low negative predictive value for mass, vegetation, or embolic source. Consider KENYA or MRI if clinically indicated.  QUANTITATIVE DATA SUMMARY: 2D MEASUREMENTS:                          Normal Ranges: Ao Root d:     3.30 cm   (2.0-3.7cm) LAs:           3.10 cm   (2.7-4.0cm) IVSd:          1.13 cm   (0.6-1.1cm) LVPWd:         1.19 cm   (0.6-1.1cm) LVIDd:         3.68 cm   (3.9-5.9cm) LVIDs:         2.52 cm LV Mass Index: 72.9 g/m2 LV % FS        31.5 % LA VOLUME:                               Normal Ranges: LA Vol A4C:        43.8 ml    (22+/-6mL/m2) LA Vol A2C:        31.9 ml LA Vol BP:         39.4 ml LA Vol Index A4C:  23.0ml/m2 LA Vol Index A2C:  16.8 ml/m2 LA Vol Index BP:   20.7 ml/m2 LA Area A4C:       16.4 cm2 LA Area A2C:       13.3 cm2 LA Major Axis A4C: 5.2 cm LA Major Axis A2C: 4.7 cm LA Volume Index:   19.5 ml/m2 LA Vol A4C:        42.0 ml LA Vol A2C:        30.0 ml RA VOLUME BY A/L METHOD:                               Normal Ranges: RA Vol  A4C:        41.3 ml    (8.3-19.5ml) RA Vol Index A4C:  21.7 ml/m2 RA Area A4C:       16.2 cm2 RA Major Axis A4C: 5.4 cm AORTA MEASUREMENTS:                    Normal Ranges: Asc Ao, d: 3.50 cm (2.1-3.4cm) LV SYSTOLIC FUNCTION BY 2D PLANIMETRY (MOD):                     Normal Ranges: EF-A4C View: 66.7 % (>=55%) EF-A2C View: 67.1 % EF-Biplane:  65.8 % LV DIASTOLIC FUNCTION:                           Normal Ranges: MV Peak E:     0.82 m/s   (0.7-1.2 m/s) MV Peak A:     1.00 m/s   (0.42-0.7 m/s) E/A Ratio:     0.82       (1.0-2.2) MV lateral e'  0.11 m/s MV medial e'   0.08 m/s E/e' Ratio:    7.20       (<8.0) PulmV Sys Mann: 54.10 cm/s MITRAL VALVE:                Normal Ranges: MV DT: 82 msec (150-240msec) AORTIC VALVE:                                   Normal Ranges: AoV Vmax:                1.30 m/s (<=1.7m/s) AoV Peak P.8 mmHg (<20mmHg) AoV Mean P.0 mmHg (1.7-11.5mmHg) LVOT Max Mann:            1.12 m/s (<=1.1m/s) AoV VTI:                 21.10 cm (18-25cm) LVOT VTI:                15.10 cm LVOT Diameter:           2.00 cm  (1.8-2.4cm) AoV Area, VTI:           2.25 cm2 (2.5-5.5cm2) AoV Area,Vmax:           2.71 cm2 (2.5-4.5cm2) AoV Dimensionless Index: 0.72  RIGHT VENTRICLE: RV Basal 3.49 cm RV Mid   2.56 cm RV Major 7.4 cm TAPSE:   28.7 mm RV s'    0.23 m/s TRICUSPID VALVE/RVSP:                             Normal Ranges: Peak TR Velocity: 2.54 m/s RV Syst Pressure: 28.8 mmHg (< 30mmHg) PULMONIC VALVE:                         Normal Ranges: PV Accel Time: 85 msec  (>120ms) PV Max Mann:    1.3 m/s  (0.6-0.9m/s) PV Max P.0 mmHg Pulmonary Veins: PulmV Sys Mann: 54.10 cm/s  [id number] Dhaval Wisdom DO Electronically signed on 2024 at 11:14:32 AM  Wall Scoring  ** Final **      ECG 12 lead     Result Date: 2024  Sinus tachycardia Inferior infarct (cited on or before 2024) Abnormal ECG When compared with ECG of 2024 15:47, Nonspecific T wave abnormality no longer  evident in Lateral leads     ECG 12 lead     Result Date: 1/24/2024  Sinus tachycardia Inferior infarct , age undetermined Abnormal ECG No previous ECGs available Confirmed by Ramón Li ([id number]) on 1/24/2024 5:02:31 PM     MR knee left wo IV contrast     Result Date: 1/24/2024  Interpreted By:  Dhaval Vazquez, STUDY: MR KNEE LEFT WO IV CONTRAST;  1/24/2024 9:44 am   INDICATION: Signs/Symptoms:history septic arthritis rule out other abscess.   COMPARISON: None.   ACCESSION NUMBER(S): [id number]   ORDERING CLINICIAN: GERMAN HUGHES   TECHNIQUE: Multiplanar and multisequential MR images of the left knee are performed. Intravenous gadolinium is not administered. The study is limited by motion degradation.   FINDINGS: There is a moderate-sized joint effusion. There is some synovial lipomatous hypertrophy at the suprapatellar bursa with lobular peripheral fat signal. There are 2 rounded foci of low T1 weighted and T2 weighted signal with mild susceptibility. These findings are identified medial of midline. The larger focus measures 11 mm in maximum diameter. The smaller focus measures 5 mm in maximum diameter. These findings could reflect loose bodies or possibly air. In addition to fluid and fat signal there is some lobular signal in the suprapatellar bursa which is of intermediate to low proton density signal and low signal and fat saturated T2 weighted sequences. This appearance could be related to blood product. There is some edema and fluid in Hoffa's fat   There is ill-defined fluid and edema in the subcutaneous fat circumferentially. There is no organized fluid collection. There is fluid in the intermuscular fascia posteriorly at the level of the calf with some fluid tracking between the medial and lateral gastrocnemius muscle bellies and the gastrocnemius and soleus muscle bellies. There is no abscess formation.   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 There is full-thickness loss of the articular cartilage in the medial compartment with fluid in the joint space. There is mild subchondral edema across the joint which is more likely degenerative. There is minimal subcortical degenerative signal also noted across the lateral compartment. There is no overt bone destruction.   The medial meniscus is abnormal. The posterior horn demonstrates some diffuse contour irregularity with complex signal at the inner 3rd and middle 3rd of the meniscus. Linear signal extends to the superior and inferior articular surfaces. There is some blunting of the free edge. The body of the meniscus is displaced peripherally. There is some blunting of the free edge. There is curvilinear signal contacting the inferior articular surface which could be artifactual. The anterior horn also demonstrates oblique linear signal extending to the inferior articular surface which is more likely artifactual. There is some irregularity at the free edge.   The lateral meniscus demonstrates some amorphous signal in the posterior horn. There is faint linear signal abutting the inferior articular surface at the inner 3rd of the posterior horn. There is no truncation of the meniscus.   The anterior cruciate ligament demonstrates amorphous intermediate intrasubstance signal throughout. There is no full-thickness discontinuity. There is intermediate intrasubstance signal in the proximal 2/3 of the posterior cruciate ligament as well. There is no focal or full-thickness discontinuity. There is some intermediate intrasubstance signal in the proximal fibular collateral ligament. There is no focal or full-thickness discontinuity. The iliotibial band and biceps femoris tendon are intact. The popliteus tendon is intact. There is mild edema in the popliteus muscle. The medial collateral ligament, extensor complex, and patellar retinacula are intact.         Moderate-sized joint effusion with some complex signal in the suprapatellar bursa. There is nonspecific joint fluid and possibly coexisting blood product. 2 round low signal foci are identified as well measuring 11 and 5 mm in diameter. These findings could be related to calcified loose bodies or air. There is some ill-defined fluid and edema in Hoffa's fat. The patient gives history of septic arthritis with surgical irrigation of the joint.   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 There is superimposed complex tearing of the posterior horn of the medial meniscus. There is minor subchondral bone marrow signal across the joint which is more likely degenerative in nature. There is no overt bone marrow edema or bone destruction to suggest osteomyelitis.   Small linear tear of the posterior horn of the lateral meniscus to the inferior articular surface.   Ill-defined fluid and edema in the subcutaneous fat circumferentially is nonspecific. There is no abscess. There is also nonspecific fluid extending in the intermuscular fat planes of the proximal calf.   Intermediate intrasubstance signal within the cruciate ligaments and proximal fibular collateral ligament could be related to mucoid degeneration or previous surgical intervention. There is no full-thickness disruption of the structures.   The study is limited by motion degradation.   Signed by: Dhaval Vazquez 1/24/2024 10:48 AM Dictation workstation:   [id number]     MR shoulder left w and wo IV contrast     Result Date: 1/23/2024  Interpreted By:  Dhaval Vazquez, STUDY: MR SHOULDER LEFT W AND WO IV CONTRAST;  1/23/2024 1:26 pm   INDICATION: Signs/Symptoms:possible sc joint infection. History of septic arthritis of left knee. Pain in left sternoclavicular joint.   COMPARISON: None.   ACCESSION NUMBER(S): [id number]   ORDERING CLINICIAN: RALEIGH SALINAS   TECHNIQUE: Multiplanar and multisequential MR images of the left  sternoclavicular joint are performed. The study is supplemented with 3 plane post gadolinium fat saturated T1 weighted sequences. 15 cc of intravenous Dotarem is utilized.   FINDINGS: There is fluid signal intensity within the left 1st costochondral joint. There is minimal bone marrow edema at the adjacent articular surfaces. There is no overt bone destruction. There is ill-defined surrounding edema and enhancement extending into the left pectoralis muscles. Soft tissue edema extends along the chest wall between the 1st and 2nd and to a lesser extent the 2nd and 3rd rib. There is some edema extending deep to the joint within the superior mediastinal fat. No fluid collection is identified at this site.   There is minimal subarticular edema across the left 1st sternoclavicular joint. There is no fluid accumulation in the joint or widening of the joint space. There is no bone erosion. There is mild surrounding soft tissue edema.   There is mild edema within the posterior 2nd rib best appreciated on sagittal images.   There is trace fluid along the posterior pleural surface of the upper thorax.        Focal fluid signal within the left 1st costochondral joint. There is surrounding soft tissue edema and enhancement with some ill-defined fluid. There is no soft tissue abscess. There is no overt bone destruction. These findings are suspicious for septic arthritis.   Minimal edema across the left sternoclavicular joint. There is no fluid in the joint space or widening of the joint. There is mild surrounding soft tissue edema.   Bone marrow edema in the posterior left rib could be reactive is of uncertain significance. There is no bone destruction or fracture.   Small volume left pleural effusion.   Signed by: Dhaval Vazquez 1/23/2024 3:43 PM Dictation workstation:   [id number]     XR shoulder left 2+ views     Result Date: 1/22/2024  Interpreted By:  Cali Smalls, STUDY: XR SHOULDER LEFT 2+ VIEWS; ;  1/22/2024  4:42 pm   INDICATION: Signs/Symptoms:Left shoulder tenderness with palation and reproducible with movement.   COMPARISON: None.   ACCESSION NUMBER(S): [id number]   ORDERING CLINICIAN: JALEN ABRAHAM   FINDINGS: No acute fracture or dislocation. No significant soft tissue swelling. Mild acromioclavicular joint osteoarthritis.        No acute osseous abnormality.   Signed by: Cali Smalls 1/22/2024 5:01 PM Dictation workstation:   [id number]     XR knee 4+ views bilateral     Result Date: 1/15/2024  Interpreted By:  German Marti III, STUDY: XR KNEE 4+ VIEWS BILATERAL; ;  1/15/2024 2:58 pm   INDICATION: Signs/Symptoms:pain.   COMPARISON: None.   ACCESSION NUMBER(S): [id number]   ORDERING CLINICIAN: GERMAN MARTI   FINDINGS: Bilateral weight-bearing views knees: Severe osteophytosis sclerosis joint space narrowing the medial compartment as well as about the patellofemoral space. There is osteophytosis and sclerosis as well as subchondral cystic change consistent with primary arthritis. No acute osseous abnormality no fracture or dislocation appreciated        Severe bilateral knee arthritis     MACRO: None   Signed by: German Marti III 1/15/2024 5:09 PM Dictation workstation:   [id number]               Assessment/Plan      # Left Knee Arthritis w/ Septic Arthritis  I&D procedure / Arthoscopic I&D x2   Admit to Orthopedics Team   Hospitalist team Consulted   DVTp and Pain management per Ortho   PT/OT evaluation  and treatment   CBC/BMP as appropriate, review results  Pulmonary Toileting   Follow up as needed outpatient with Orthopedics      # Diabetes Mellitus Type 2  Episode of Hypoglycemia - asymptomatic - improved  and stable   Diet Cardiac/Diabetic - changed to Regular, poor appetite while inpatient and may be cause of hypoglycemia - acute   Continue Sliding Scale SSI AC/HS   A1C 15 12/2023  Encourage healthy lifestyle changes  We will continue to monitor, may need to adjust medications based on glucose  readings     # HTN / HLD  / Tachycardia  Continue home mediations as appropriate  Denies significant cardiac history  Hold Lisinopril at this time   Pt tachycardic this AM with fever. Will add Telemetry  IVF bolus Normal Saline        #GERD  Continue PPI  Stay upright for 30minutes  Take pain medications with food     # Acute Left Shoulder Pain - Probable Septic Arthritis  MRI :  Impression:     Focal fluid signal within the left 1st costochondral joint. There is  surrounding soft tissue edema and enhancement with some ill-defined  fluid. There is no soft tissue abscess. There is no overt bone  destruction. These findings are suspicious for septic arthritis.      Minimal edema across the left sternoclavicular joint. There is no  fluid in the joint space or widening of the joint. There is mild  surrounding soft tissue edema.      Bone marrow edema in the posterior left rib could be reactive is of  uncertain significance. There is no bone destruction or fracture.      Small volume left pleural effusion.      # Small Pleural Effusion   Check repeat pCXR in AM; review imaging for potential thoracentesis   Remains on RA  No adventitious breath sounds heard         Thank you for consult  Hemodynamically stable     Diabetic Diet  FULL CODE  DVTp: Defer to Orthopedics / Lovenox     Hemodynamically stable     Pulmonary comments:-Patient admitted with sepsis and infected left knee also incidentally noted noted to have a small left pleural effusion on admission chest x-ray.  I was asked to evaluate for empyema and follow this from patient from a pulmonary perspective.  Based on physical exam I am not sure there is a sizable pleural effusion.  Would repeat chest x-ray early a.m. as a portable study as patient unable to stand.  Radiology can do a  ultrasound of the chest in AM and do a thoracentesis if there is sufficient fluid for diagnosis.  I shall continue to monitor this patient with you and I thank you for the  referral.    Mike Cardoza MD

## 2024-01-26 NOTE — CONSULTS
Social Work Note  The LSW was notified by the [facility] re: the [facility] care plan goal. The [facility] has responded in [facility] that the pt is clinically accepted and have talked with the pt's brother. The pt will require a necessary precert conducted by [facility].    Update 5:57pm  The Unit RN had notified the LSW that the pt's sister had requested a HC POA earlier in the day. The LSW met with the pt and reviewed the order of HC proxy in the [facility] with no HC POA in place. The pt has no spouse and therefore the pt's only son, Laureano  would be his HC Proxy. The pt stated that he wanted his sister as his HC POA and then his son as the alternate HC POA stating that the sister has a medical background. The LSW assisted in the completing, signing and witnessing the pt's HC POA. The pt has the original HC POA and 2 copies. The pt's HC POA has been scanned into the pt's medical record.

## 2024-01-26 NOTE — PROGRESS NOTES
Occupational Therapy    OT Treatment    Patient Name: Melchor Bergeron  MRN: [id number]  Today's Date: 1/26/2024  Time Calculation  Start Time: 1319  Stop Time: 1342  Time Calculation (min): 23 min         Assessment:  End of Session Communication: Bedside nurse  End of Session Patient Position: Up in chair, Alarm on       Previous Visit Info:  OT Last Visit  OT Received On: 01/26/24  General:  General  Family/Caregiver Present:  (pt sister and significant other present)  Prior to Session Communication: Bedside nurse  Patient Position Received: Bed, 2 rail up, Alarm on  General Comment: pt seated upright in bed agreeable to OT tx.  Precautions:  LE Weight Bearing Status:  (pt LLE WABT , KI for comfort and ok for ROM)  Medical Precautions: Fall precautions       Pain:  Pain Assessment  Pain Assessment: 0-10  Pain Score: 3  Pain Location: Knee         Activities of Daily Living: LE Dressing  LE Dressing:  (pt required max A for LB dressing tasks while seated EOB)  Functional Standing Tolerance:  Functional Standing Tolerance Comments: pt demos F - supported standing balance during functional transfers  Bed Mobility/Transfers: Transfers  Transfer:  (pt required mod A x 2 for functional transfers with use of FWW , pt presents with a forward flexed posture. pt required VC for maintaining upright balance. pt educated on maintaining stance within FWW.)      Outcome Measures:[facility] Daily Activity  Putting on and taking off regular lower body clothing: A lot  Bathing (including washing, rinsing, drying): A lot  Putting on and taking off regular upper body clothing: A little  Toileting, which includes using toilet, bedpan or urinal: A little  Taking care of personal grooming such as brushing teeth: A little  Eating Meals: None  Daily Activity - Total Score: 17        Education Documentation  ADL Training, taught by MANUEL Marroquin at 1/26/2024  1:55 PM.  Learner: Significant Other, Family, Patient  Readiness:  Acceptance  Method: Explanation  Response: Needs Reinforcement  Comment: pt educated on OT POC and D/C planning    Education Comments  No comments found.        OP EDUCATION:       Goals:  Encounter Problems       Encounter Problems (Active)       OT Goals       OT Goal 1 (Progressing)       Start:  01/21/24    Expected End:  02/04/24       Patient will complete UB bathing and dressing with Mod I/Independent with AE as needed         OT Goal 2 (Progressing)       Start:  01/21/24    Expected End:  02/04/24       Patient will complete LB bathing and dressing with CGA/SBA and with AE as needed.         OT Goal 3 (Progressing)       Start:  01/21/24    Expected End:  02/04/24       Patient will complete all transfers for ADLs and functional activities with CGA/Min A with FWW as needed.

## 2024-01-26 NOTE — PROGRESS NOTES
Physical Therapy    Physical Therapy Treatment    Patient Name: Melchor Bergeron  MRN: [id number]  Today's Date: 1/26/2024  Time Calculation  Start Time: 1457  Stop Time: 1523  Time Calculation (min): 26 min       Assessment/Plan   PT Assessment  PT Assessment Results: Decreased mobility, Decreased strength, Decreased endurance  Rehab Prognosis: Good  Evaluation/Treatment Tolerance: Patient limited by pain  Medical Staff Made Aware: Yes  End of Session Communication: Bedside nurse  Assessment Comment: pt would benefit from continued therapy to improved functional mobilityand safety  End of Session Patient Position: Alarm on, Up in chair  PT Plan  Inpatient/Swing Bed or Outpatient: Inpatient  Treatment/Interventions: Transfer training, Gait training, Therapeutic exercise, Therapeutic activity  PT Plan: Skilled PT  PT Frequency: 5 times per week  PT Discharge Recommendations: Moderate intensity level of continued care, Low intensity level of continued care   PT Recommended Transfer Status: Assist x1, Assistive device    General Visit Information:   PT  Visit  PT Received On: 01/26/24  General  Reason for Referral: I&D L knee  Family/Caregiver Present: No  Caregiver Feedback: pt's sister present and supportive  Prior to Session Communication: Bedside nurse (cleared by nursing to participate)  Patient Position Received: Alarm on, Up in chair  Preferred Learning Style: auditory, visual, written  General Comment: agreeable to particpate (states that he is still having a lot pf pain but trying to move more frequently)        Subjective     Precautions:  Precautions  LE Weight Bearing Status: Weight Bearing as Tolerated (L LE, KI for comfort , ROM OK)  Medical Precautions: Fall precautions  Post-Surgical Precautions:  (Cleared for left knee ROM, KI not needed per ortho NP)  Precautions Comment: OK for ROM, KI as needed    Vital Signs:  Vital Signs  SpO2: 97 %  Patient Position: Standing  Objective     Pain:  Pain  Assessment  Pain Assessment: 0-10  Pain Score: 7  Pain Type: Acute pain, Surgical pain  Pain Location: Knee  Pain Orientation: Left  Pain Descriptors: Aching, Throbbing  Pain Frequency: Intermittent  Pain Interventions: Cold pack    Cognition:  Cognition  Overall Cognitive Status: Within Functional Limits          Activity Tolerance:  Activity Tolerance  Endurance: Decreased tolerance for upright activites    Treatments:  Therapeutic Exercise  Therapeutic Exercise Performed: Yes  Therapeutic Exercise Activity 1: reviewed  ther ex including AP, QS, GS, SAQ, LAQ, Hip flexion, abd/add 10 reps each, pt states he has been performing some        Bed Mobility  Bed Mobility: No  Bed Mobility 1  Bed Mobility Comments 1: transfers supine --> sit with Mod A for trunk, pt ablw to advance LE's with Min A , tolerated sitting EOB with CGA and vc to soot to edge of bed until feet touch ground  Ambulation/Gait Training  Ambulation/Gait Training Performed: Yes  Ambulation/Gait Training 1  Surface 1: Level tile  Device 1: Rolling walker  Comments/Distance (ft) 1: ambulating  15 ft and 10 ft with   WW and Min A, pt tolerated standing to urinate with WW for support and standing to [facility] UE with WW and min A , became fatigued and needed chair while washing hands (pt fatigues and flexes forward at waist attempting to rest arms on WW, pt has difficulty getting LE's into full extension, needs vc for walker placement and for sequencing)  Transfers  Transfer: Yes  Transfer 1  Technique 1: Sit to stand, Stand to sit  Trials/Comments 1: transfers sit <--> stand with WW and Min A and vc for technique,  hand placement and safety, pt has difficulty scooting in chair to repostion and to get to edge of chair prior to standng  Stairs  Stairs: No       Outcome Measures:  [facility] Basic Mobility  Turning from your back to your side while in a flat bed without using bedrails: A little  Moving from lying on your back to sitting on the side of a flat bed  without using bedrails: A lot  Moving to and from bed to chair (including a wheelchair): A little  Standing up from a chair using your arms (e.g. wheelchair or bedside chair): A lot  To walk in hospital room: A little  Climbing 3-5 steps with railing: Total  Basic Mobility - Total Score: 14  Education Documentation  Precautions, taught by Isabelle Ledezma PTA at 1/26/2024  4:34 PM.  Learner: Patient  Readiness: Acceptance  Method: Explanation  Response: Verbalizes Understanding, Needs Reinforcement    Mobility Training, taught by Isabelle Ledezma PTA at 1/26/2024  4:34 PM.  Learner: Patient  Readiness: Acceptance  Method: Explanation  Response: Verbalizes Understanding, Needs Reinforcement    Precautions, taught by Isabelle Ledezma PTA at 1/23/2024  3:19 PM.  Learner: Significant Other, Family, Patient  Readiness: Acceptance  Method: Explanation, Demonstration  Response: Verbalizes Understanding, Needs Reinforcement    Mobility Training, taught by Isabelle Ledezma PTA at 1/23/2024  3:19 PM.  Learner: Significant Other, Family, Patient  Readiness: Acceptance  Method: Explanation, Demonstration  Response: Verbalizes Understanding, Needs Reinforcement    Precautions, taught by Isabelle Ledezma PTA at 1/22/2024 11:50 AM.  Learner: Patient  Readiness: Acceptance  Method: Explanation  Response: Needs Reinforcement, Verbalizes Understanding    Mobility Training, taught by Isabelle Ledezma PTA at 1/22/2024 11:50 AM.  Learner: Patient  Readiness: Acceptance  Method: Explanation  Response: Needs Reinforcement, Verbalizes Understanding    Education Comments  No comments found.        EDUCATION:  Outpatient Education  Individual(s) Educated: Patient  Education Provided: Fall Risk, Home Exercise Program, Home Safety  Patient Response to Education: Patient/Caregiver Verbalized Understanding of Information    Encounter Problems       Encounter Problems (Active)       PT Problem       Pt will demonstrate mod I with bed mobility to  edge of bed.   (Progressing)       Start:  01/21/24    Expected End:  02/04/24            Pt will demonstrate mod I  with sit to stand/chair transfers with FWW.   (Progressing)       Start:  01/21/24    Expected End:  02/04/24            Pt will ambulate 50 feet with FWW mod I NWB L LE .   (Not met)       Start:  01/21/24    Expected End:  02/04/24    Resolved:  01/25/24    Updated to: Pt will ambulate 100 feet with FWW mod I WBAT L LE .    Update reason: wt bearing change          Pt to demo improved BLE strength by being able to complete supine/seated thera ex 2x20 BLEs with 4 or less rest breaks .   (Progressing)       Start:  01/21/24    Expected End:  02/04/24            Pt will ambulate 100 feet with FWW mod I WBAT L LE . (Progressing)       Start:  01/25/24    Expected End:  02/04/24

## 2024-01-27 LAB
ANION GAP SERPL CALC-SCNC: 16 MMOL/L (ref 10–20)
ATRIAL RATE: 124 BPM
BUN SERPL-MCNC: 24 MG/DL (ref 6–23)
CALCIUM SERPL-MCNC: 8.5 MG/DL (ref 8.6–10.3)
CHLORIDE SERPL-SCNC: 102 MMOL/L (ref 98–107)
CO2 SERPL-SCNC: 18 MMOL/L (ref 21–32)
CREAT SERPL-MCNC: 1 MG/DL (ref 0.5–1.3)
CRP SERPL-MCNC: 27.69 MG/DL
EGFRCR SERPLBLD CKD-EPI 2021: 85 ML/MIN/1.73M*2
ERYTHROCYTE [DISTWIDTH] IN BLOOD BY AUTOMATED COUNT: 13.4 % (ref 11.5–14.5)
ERYTHROCYTE [SEDIMENTATION RATE] IN BLOOD BY WESTERGREN METHOD: 62 MM/H (ref 0–20)
GLUCOSE BLD MANUAL STRIP-MCNC: 142 MG/DL (ref 74–99)
GLUCOSE BLD MANUAL STRIP-MCNC: 168 MG/DL (ref 74–99)
GLUCOSE BLD MANUAL STRIP-MCNC: 169 MG/DL (ref 74–99)
GLUCOSE BLD MANUAL STRIP-MCNC: 189 MG/DL (ref 74–99)
GLUCOSE SERPL-MCNC: 132 MG/DL (ref 74–99)
HCT VFR BLD AUTO: 36.8 % (ref 41–52)
HGB BLD-MCNC: 12.5 G/DL (ref 13.5–17.5)
HOLD SPECIMEN: NORMAL
MCH RBC QN AUTO: 28.2 PG (ref 26–34)
MCHC RBC AUTO-ENTMCNC: 34 G/DL (ref 32–36)
MCV RBC AUTO: 83 FL (ref 80–100)
NRBC BLD-RTO: 0 /100 WBCS (ref 0–0)
P AXIS: 73 DEGREES
P OFFSET: 195 MS
P ONSET: 144 MS
PLATELET # BLD AUTO: 196 X10*3/UL (ref 150–450)
POTASSIUM SERPL-SCNC: 3.9 MMOL/L (ref 3.5–5.3)
PR INTERVAL: 136 MS
Q ONSET: 212 MS
QRS COUNT: 20 BEATS
QRS DURATION: 82 MS
QT INTERVAL: 332 MS
QTC CALCULATION(BAZETT): 476 MS
QTC FREDERICIA: 422 MS
R AXIS: -3 DEGREES
RBC # BLD AUTO: 4.43 X10*6/UL (ref 4.5–5.9)
SODIUM SERPL-SCNC: 132 MMOL/L (ref 136–145)
STAPHYLOCOCCUS SPEC CULT: NORMAL
T AXIS: 66 DEGREES
T OFFSET: 378 MS
VENTRICULAR RATE: 124 BPM
WBC # BLD AUTO: 8.8 X10*3/UL (ref 4.4–11.3)

## 2024-01-27 PROCEDURE — 85027 COMPLETE CBC AUTOMATED: CPT | Performed by: STUDENT IN AN ORGANIZED HEALTH CARE EDUCATION/TRAINING PROGRAM

## 2024-01-27 PROCEDURE — 97116 GAIT TRAINING THERAPY: CPT | Mod: GP,CQ

## 2024-01-27 PROCEDURE — 99232 SBSQ HOSP IP/OBS MODERATE 35: CPT | Performed by: STUDENT IN AN ORGANIZED HEALTH CARE EDUCATION/TRAINING PROGRAM

## 2024-01-27 PROCEDURE — 36415 COLL VENOUS BLD VENIPUNCTURE: CPT | Performed by: STUDENT IN AN ORGANIZED HEALTH CARE EDUCATION/TRAINING PROGRAM

## 2024-01-27 PROCEDURE — 2500000004 HC RX 250 GENERAL PHARMACY W/ HCPCS (ALT 636 FOR OP/ED)

## 2024-01-27 PROCEDURE — 1200000002 HC GENERAL ROOM WITH TELEMETRY DAILY

## 2024-01-27 PROCEDURE — 85652 RBC SED RATE AUTOMATED: CPT | Performed by: NURSE PRACTITIONER

## 2024-01-27 PROCEDURE — 86140 C-REACTIVE PROTEIN: CPT | Performed by: NURSE PRACTITIONER

## 2024-01-27 PROCEDURE — 2500000004 HC RX 250 GENERAL PHARMACY W/ HCPCS (ALT 636 FOR OP/ED): Performed by: NURSE PRACTITIONER

## 2024-01-27 PROCEDURE — 82565 ASSAY OF CREATININE: CPT | Performed by: STUDENT IN AN ORGANIZED HEALTH CARE EDUCATION/TRAINING PROGRAM

## 2024-01-27 PROCEDURE — 2500000001 HC RX 250 WO HCPCS SELF ADMINISTERED DRUGS (ALT 637 FOR MEDICARE OP)

## 2024-01-27 PROCEDURE — 97530 THERAPEUTIC ACTIVITIES: CPT | Mod: GP,CQ

## 2024-01-27 PROCEDURE — 97110 THERAPEUTIC EXERCISES: CPT | Mod: GP,CQ

## 2024-01-27 PROCEDURE — 82947 ASSAY GLUCOSE BLOOD QUANT: CPT

## 2024-01-27 PROCEDURE — 2500000004 HC RX 250 GENERAL PHARMACY W/ HCPCS (ALT 636 FOR OP/ED): Performed by: INTERNAL MEDICINE

## 2024-01-27 PROCEDURE — 2500000001 HC RX 250 WO HCPCS SELF ADMINISTERED DRUGS (ALT 637 FOR MEDICARE OP): Performed by: NURSE PRACTITIONER

## 2024-01-27 RX ADMIN — CEFEPIME 2 G: 2 INJECTION, POWDER, FOR SOLUTION INTRAVENOUS at 14:19

## 2024-01-27 RX ADMIN — DOCUSATE SODIUM 100 MG: 100 CAPSULE, LIQUID FILLED ORAL at 20:12

## 2024-01-27 RX ADMIN — VANCOMYCIN HYDROCHLORIDE 750 MG: 750 INJECTION, SOLUTION INTRAVENOUS at 15:34

## 2024-01-27 RX ADMIN — INSULIN LISPRO 1 UNITS: 100 INJECTION, SOLUTION INTRAVENOUS; SUBCUTANEOUS at 16:17

## 2024-01-27 RX ADMIN — CEFEPIME 2 G: 2 INJECTION, POWDER, FOR SOLUTION INTRAVENOUS at 21:12

## 2024-01-27 RX ADMIN — ROSUVASTATIN CALCIUM 10 MG: 10 TABLET, FILM COATED ORAL at 20:12

## 2024-01-27 RX ADMIN — INSULIN LISPRO 1 UNITS: 100 INJECTION, SOLUTION INTRAVENOUS; SUBCUTANEOUS at 11:49

## 2024-01-27 RX ADMIN — OXYCODONE HYDROCHLORIDE 5 MG: 5 TABLET ORAL at 08:05

## 2024-01-27 RX ADMIN — FAMOTIDINE 20 MG: 20 TABLET, FILM COATED ORAL at 20:12

## 2024-01-27 RX ADMIN — LISINOPRIL 10 MG: 10 TABLET ORAL at 09:35

## 2024-01-27 RX ADMIN — ENOXAPARIN SODIUM 40 MG: 40 INJECTION SUBCUTANEOUS at 08:03

## 2024-01-27 RX ADMIN — CEFEPIME 2 G: 2 INJECTION, POWDER, FOR SOLUTION INTRAVENOUS at 06:10

## 2024-01-27 RX ADMIN — OXYCODONE HYDROCHLORIDE 5 MG: 5 TABLET ORAL at 20:22

## 2024-01-27 RX ADMIN — VANCOMYCIN HYDROCHLORIDE 750 MG: 750 INJECTION, SOLUTION INTRAVENOUS at 04:33

## 2024-01-27 RX ADMIN — DOCUSATE SODIUM 100 MG: 100 CAPSULE, LIQUID FILLED ORAL at 08:03

## 2024-01-27 RX ADMIN — FAMOTIDINE 20 MG: 20 TABLET, FILM COATED ORAL at 08:03

## 2024-01-27 ASSESSMENT — PAIN SCALES - GENERAL
PAINLEVEL_OUTOF10: 6
PAINLEVEL_OUTOF10: 5 - MODERATE PAIN
PAINLEVEL_OUTOF10: 3
PAINLEVEL_OUTOF10: 4
PAINLEVEL_OUTOF10: 7

## 2024-01-27 ASSESSMENT — PAIN - FUNCTIONAL ASSESSMENT
PAIN_FUNCTIONAL_ASSESSMENT: 0-10

## 2024-01-27 ASSESSMENT — PAIN DESCRIPTION - DESCRIPTORS: DESCRIPTORS: ACHING;THROBBING;SPASM

## 2024-01-27 ASSESSMENT — COGNITIVE AND FUNCTIONAL STATUS - GENERAL
DAILY ACTIVITIY SCORE: 17
MOVING FROM LYING ON BACK TO SITTING ON SIDE OF FLAT BED WITH BEDRAILS: A LITTLE
MOVING TO AND FROM BED TO CHAIR: A LOT
DRESSING REGULAR LOWER BODY CLOTHING: A LOT
TOILETING: A LITTLE
CLIMB 3 TO 5 STEPS WITH RAILING: TOTAL
STANDING UP FROM CHAIR USING ARMS: A LOT
DRESSING REGULAR UPPER BODY CLOTHING: A LITTLE
MOVING FROM LYING ON BACK TO SITTING ON SIDE OF FLAT BED WITH BEDRAILS: A LITTLE
DAILY ACTIVITIY SCORE: 18
CLIMB 3 TO 5 STEPS WITH RAILING: TOTAL
PERSONAL GROOMING: A LITTLE
HELP NEEDED FOR BATHING: A LOT
MOBILITY SCORE: 12
TURNING FROM BACK TO SIDE WHILE IN FLAT BAD: A LOT
STANDING UP FROM CHAIR USING ARMS: A LOT
TOILETING: A LITTLE
MOVING FROM LYING ON BACK TO SITTING ON SIDE OF FLAT BED WITH BEDRAILS: A LITTLE
HELP NEEDED FOR BATHING: A LOT
WALKING IN HOSPITAL ROOM: A LOT
MOVING TO AND FROM BED TO CHAIR: A LOT
DRESSING REGULAR UPPER BODY CLOTHING: A LITTLE
WALKING IN HOSPITAL ROOM: A LOT
CLIMB 3 TO 5 STEPS WITH RAILING: TOTAL
DRESSING REGULAR LOWER BODY CLOTHING: A LOT
MOVING TO AND FROM BED TO CHAIR: A LOT
TURNING FROM BACK TO SIDE WHILE IN FLAT BAD: A LOT
STANDING UP FROM CHAIR USING ARMS: A LOT
MOBILITY SCORE: 12
WALKING IN HOSPITAL ROOM: A LOT
MOBILITY SCORE: 12
TURNING FROM BACK TO SIDE WHILE IN FLAT BAD: A LOT

## 2024-01-27 NOTE — PROGRESS NOTES
Physical Therapy    Physical Therapy Treatment    Patient Name: Melchor Bergeron  MRN: [id number]  Today's Date: 1/27/2024  Time Calculation  Start Time: 1059  Stop Time: 1150  Time Calculation (min): 51 min       Assessment/Plan   PT Assessment  PT Assessment Results: Decreased strength, Decreased endurance, Decreased mobility, Decreased safety awareness, Pain  Rehab Prognosis: Good  Evaluation/Treatment Tolerance: Patient limited by fatigue, Patient limited by pain  Medical Staff Made Aware: Yes  End of Session Communication: Bedside nurse  Assessment Comment: pt would benefit from continued therapy to improve functional mobility , strength and safety  End of Session Patient Position: Alarm on, Up in chair  PT Plan  Inpatient/Swing Bed or Outpatient: Inpatient  Treatment/Interventions: Bed mobility, Transfer training, Gait training, Therapeutic exercise, Therapeutic activity, Home exercise program  PT Plan: Skilled PT  PT Frequency: 5 times per week  PT Discharge Recommendations: Moderate intensity level of continued care  Equipment Recommended upon Discharge:  (pt states that he has a standard walker at home and can borrow a rollator if needed) PT Recommended Transfer Status: Assist x1, Assistive device    General Visit Information:   PT  Visit  PT Received On: 01/27/24  General  Reason for Referral: I&D L knee  Family/Caregiver Present: Yes  Caregiver Feedback: sister present and supportive  Prior to Session Communication: Bedside nurse (cleared by nursing to participate)  Patient Position Received: Bed, 3 rail up (L LE ER with knee in flexed position , pt states it is too painful to keep L LE in extension and he has to let it rest and bend for short intervals, encourged to keep LE's in extension  when supine or in longsit)  Preferred Learning Style: auditory, visual, written  General Comment: agreeable to participate , states he has been trying to be out of   bed more    General Observations:   General Observation:  pt reports he  had a fall yesterday while gettingback to bed     reports he thinks he was not close enough to the bed and just became too tired , pt cleared by nursing to participate        Precautions:  Precautions  LE Weight Bearing Status: Weight Bearing as Tolerated  Medical Precautions: Fall precautions  Post-Surgical Precautions:  (Cleared for left knee ROM, KI not needed per ortho NP)  Precautions Comment: OK for ROM, KI as needed    Vital Signs:  Vital Signs  Heart Rate: (!) 117 (after gait, decreasing to 99 affter seated rest)  SpO2: 98 %  Patient Position: Standing  Objective     Pain:  Pain Assessment  Pain Assessment: 0-10  Pain Score: 7 (pt states he thinks he is due for pain medicine but tries not use it becuase he doesnt want to get addicted)  Pain Type: Surgical pain, Acute pain  Pain Location: Knee  Pain Orientation: Left  Pain Descriptors: Aching, Throbbing, Spasm  Pain Frequency: Intermittent  Pain Interventions: Cold applied    Cognition:  Cognition  Overall Cognitive Status: Within Functional Limits           Activity Tolerance:  Activity Tolerance  Endurance: Decreased tolerance for upright activites    Treatments:  Therapeutic Exercise  Therapeutic Exercise Performed: Yes  Therapeutic Exercise Activity 1: instructed in and performed ther ex including AP, QS, GS SAQ, LAQ, HS, and SLR 10 to 12 reps        Bed Mobility  Bed Mobility: Yes  Bed Mobility 1  Bed Mobility Comments 1: transfers supine to sit with Min A and  vc  for technique , pt using bed rails to assist , uses UE's to assist advancing L LE (has difficulty scooting to edge of bed and engaging core)  Ambulation/Gait Training  Ambulation/Gait Training Performed: Yes  Ambulation/Gait Training 1  Surface 1: Level tile  Device 1: Rolling walker  Comments/Distance (ft) 1: ambualting 10ft with WW and Min  A   with Mod cueing and chair follow , pt needs constant  vc  for upright posture , sequencing , walker placement and to focus on  extension with B LEs (pt is reluctant to WB through L LE , using UEs to offload)  Transfers  Transfer: Yes  Transfer 1  Technique 1: Sit to stand, Stand to sit  Trials/Comments 1: transfers sit <--> stand with WW and MIn A with Vc for tecchnique , pt needs constant cueing for technique and safety  Stairs  Stairs: No       Outcome Measures:  [facility] Basic Mobility  Turning from your back to your side while in a flat bed without using bedrails: A little  Moving from lying on your back to sitting on the side of a flat bed without using bedrails: A lot  Moving to and from bed to chair (including a wheelchair): A lot  Standing up from a chair using your arms (e.g. wheelchair or bedside chair): A lot  To walk in hospital room: A lot  Climbing 3-5 steps with railing: Total  Basic Mobility - Total Score: 12  Education Documentation  Precautions, taught by Isabelle Ledezma PTA at 1/27/2024  2:48 PM.  Learner: Family, Patient  Readiness: Acceptance  Method: Explanation, Demonstration  Response: Verbalizes Understanding, Needs Reinforcement    Mobility Training, taught by Isabelle Ledezma PTA at 1/27/2024  2:48 PM.  Learner: Family, Patient  Readiness: Acceptance  Method: Explanation, Demonstration  Response: Verbalizes Understanding, Needs Reinforcement    Precautions, taught by Isabelle Ledezma PTA at 1/26/2024  4:34 PM.  Learner: Patient  Readiness: Acceptance  Method: Explanation  Response: Verbalizes Understanding, Needs Reinforcement    Mobility Training, taught by Isabelle Ledezma PTA at 1/26/2024  4:34 PM.  Learner: Patient  Readiness: Acceptance  Method: Explanation  Response: Verbalizes Understanding, Needs Reinforcement    Precautions, taught by Isabelle Ledezma PTA at 1/23/2024  3:19 PM.  Learner: Significant Other, Family, Patient  Readiness: Acceptance  Method: Explanation, Demonstration  Response: Verbalizes Understanding, Needs Reinforcement    Mobility Training, taught by Isabelle Ledezma PTA at 1/23/2024  3:19  PM.  Learner: Significant Other, Family, Patient  Readiness: Acceptance  Method: Explanation, Demonstration  Response: Verbalizes Understanding, Needs Reinforcement    Precautions, taught by Isabelle Ledezma PTA at 1/22/2024 11:50 AM.  Learner: Patient  Readiness: Acceptance  Method: Explanation  Response: Needs Reinforcement, Verbalizes Understanding    Mobility Training, taught by Isabelle Ledezma PTA at 1/22/2024 11:50 AM.  Learner: Patient  Readiness: Acceptance  Method: Explanation  Response: Needs Reinforcement, Verbalizes Understanding    Education Comments  No comments found.        EDUCATION:  Outpatient Education  Individual(s) Educated: Patient, Other (sister present and supportive)  Education Provided: Body Mechanics, Fall Risk, Home Exercise Program, Posture  Patient Response to Education: Patient/Caregiver Verbalized Understanding of Information    Encounter Problems       Encounter Problems (Active)       PT Problem       Pt will demonstrate mod I with bed mobility to edge of bed.   (Progressing)       Start:  01/21/24    Expected End:  02/04/24            Pt will demonstrate mod I  with sit to stand/chair transfers with FWW.   (Progressing)       Start:  01/21/24    Expected End:  02/04/24            Pt will ambulate 50 feet with FWW mod I NWB L LE .   (Not met)       Start:  01/21/24    Expected End:  02/04/24    Resolved:  01/25/24    Updated to: Pt will ambulate 100 feet with FWW mod I WBAT L LE .    Update reason: wt bearing change          Pt to demo improved BLE strength by being able to complete supine/seated thera ex 2x20 BLEs with 4 or less rest breaks .   (Progressing)       Start:  01/21/24    Expected End:  02/04/24            Pt will ambulate 100 feet with FWW mod I WBAT L LE . (Progressing)       Start:  01/25/24    Expected End:  02/04/24

## 2024-01-27 NOTE — PROGRESS NOTES
"Daily progress note    Melchor Bergeron is 63 y.o. male with history of osteoarthritis, hypertension, hyperlipidemia, type 2 diabetes poorly controlled, GERD presented with left knee swelling, patient had steroid injection to the left knee on 1/15/2024 and started having swelling after 48 hours    Subjective  Patient was seen and examined at bedside  No fever or palpitation no chest pain  No shortness of breath  Patient is on room air  Patient was complaining of pain over the left upper neck at the site of left sternoclavicular joint  Also left knee pain        Vital signs in last 24 hours:  Temp:  [36.9 °C (98.4 °F)-37.3 °C (99.1 °F)] 36.9 °C (98.4 °F)  Heart Rate:  [] 90  Resp:  [16-18] 16  BP: (140-171)/(75-88) 171/79  /79   Pulse 90   Temp 36.9 °C (98.4 °F)   Resp 16   Ht 1.753 m (5' 9\")   Wt 75.1 kg (165 lb 9.1 oz)   SpO2 97%   BMI 24.45 kg/m²    Intake/Output last 3 shifts:  I/O last 3 completed shifts:  In: 4022.5 (53.6 mL/kg) [P.O.:800; I.V.:2472.5 (32.9 mL/kg); IV Piggyback:750]  Out: 2700 (36 mL/kg) [Urine:2700 (1 mL/kg/hr)]  Weight: 75.1 kg   Intake/Output this shift:  No intake/output data recorded.    Physical Exam  Constitutional:       General: He is not in acute distress.     Appearance: Normal appearance. He is not ill-appearing, toxic-appearing or diaphoretic.   HENT:      Head: Normocephalic and atraumatic.      Mouth/Throat:      Mouth: Mucous membranes are moist.      Pharynx: No oropharyngeal exudate.   Eyes:      Extraocular Movements: Extraocular movements intact.      Pupils: Pupils are equal, round, and reactive to light.   Cardiovascular:      Rate and Rhythm: Normal rate and regular rhythm.      Heart sounds: No murmur heard.  Pulmonary:      Effort: Pulmonary effort is normal. No respiratory distress.      Breath sounds: Normal breath sounds. No wheezing.   Abdominal:      General: Abdomen is flat. Bowel sounds are normal. There is no distension.      Tenderness: There is " no abdominal tenderness. There is no guarding or rebound.   Musculoskeletal:         General: No swelling.      Right lower leg: No edema.      Left lower leg: No edema.      Comments: Dressing with Ace wrap. To the left knee   Skin:     Findings: No lesion or rash.   Neurological:      General: No focal deficit present.      Mental Status: He is alert and oriented to person, place, and time.      Cranial Nerves: No cranial nerve deficit.      Motor: No weakness.   Psychiatric:         Mood and Affect: Mood normal.         Behavior: Behavior normal.         Current medication   Current Facility-Administered Medications   Medication Dose Route Frequency Provider Last Rate Last Admin    acetaminophen (Tylenol) tablet 650 mg  650 mg oral q4h PRN DEANN Raymundo   650 mg at 01/25/24 1323    cefepime (Maxipime) in dextrose 5 % water (D5W) 100 mL IV 2 g  2 g intravenous q8h Benji Chavarria MD   Stopped at 01/27/24 0640    cyclobenzaprine (Flexeril) tablet 10 mg  10 mg oral TID PRN DEANN Raymundo   10 mg at 01/25/24 0740    dextrose 10 % in water (D10W) infusion  0.3 g/kg/hr intravenous Once PRN DEANN Alex        dextrose 50 % injection 25 g  25 g intravenous q15 min PRN DEANN Alex   25 g at 01/22/24 0638    docusate sodium (Colace) capsule 100 mg  100 mg oral BID DEANN Raymundo   100 mg at 01/27/24 0803    enoxaparin (Lovenox) syringe 40 mg  40 mg subcutaneous Daily DEANN Raymundo   40 mg at 01/27/24 0803    [Held by provider] ertugliflozin (Steglatro) tablet 15 mg  15 mg oral Daily DEANN Alex        famotidine (Pepcid) tablet 20 mg  20 mg oral q12h DEANN Alex   20 mg at 01/27/24 0803    glucagon (Glucagen) injection 1 mg  1 mg intramuscular q15 min PRN DEANN Alex        insulin lispro (HumaLOG) injection 0-5 Units  0-5 Units subcutaneous Before meals & nightly Thania Steven APRN-CNP    1 Units at 01/26/24 1642    lidocaine 4 % patch 1 patch  1 patch transdermal Daily PRN DEANN Alex   1 patch at 01/22/24 1859    lisinopril tablet 10 mg  10 mg oral Daily DEANN Alex   10 mg at 01/25/24 0843    morphine injection 2 mg  2 mg intravenous q4h PRN SOLEDAD Raymundo-CNP   2 mg at 01/25/24 0842    naloxone (Narcan) injection 0.2 mg  0.2 mg intravenous q5 min PRN SOLEDAD Raymundo-AUTUMN        ondansetron ODT (Zofran-ODT) disintegrating tablet 4 mg  4 mg oral q8h PRN DEANN Raymundo        Or    ondansetron (Zofran) injection 4 mg  4 mg intravenous q8h PRN SOLEDAD Raymundo-CNP   4 mg at 01/24/24 1717    oxyCODONE (Roxicodone) immediate release tablet 10 mg  10 mg oral q4h PRN SOLEDAD Raymundo-CNP   10 mg at 01/26/24 0741    oxyCODONE (Roxicodone) immediate release tablet 5 mg  5 mg oral q4h PRN SOLEDAD Raymundo-CNP   5 mg at 01/27/24 0805    rosuvastatin (Crestor) tablet 10 mg  10 mg oral Nightly DEANN Alex   10 mg at 01/26/24 2112    [Held by provider] semaglutide (Rybelsus) tablet 3 mg  3 mg oral Daily before breakfast DEANN Alex        sodium chloride 0.9% infusion  50 mL/hr intravenous Continuous DEANN Chahal 50 mL/hr at 01/27/24 0642 50 mL/hr at 01/27/24 0642    vancomycin in dextrose 5 % (Vancocin) IVPB 750 mg  750 mg intravenous q12h Akila Berkowitz, PharmD   Stopped at 01/27/24 0518        Labs  Lab Results   Component Value Date    WBC 8.8 01/27/2024    HGB 12.5 (L) 01/27/2024    HCT 36.8 (L) 01/27/2024    MCV 83 01/27/2024     01/27/2024     Lab Results   Component Value Date    HGBA1C 15.0 (H) 12/15/2023     Lab Results   Component Value Date    GLUCOSE 132 (H) 01/27/2024    CALCIUM 8.5 (L) 01/27/2024     (L) 01/27/2024    K 3.9 01/27/2024    CO2 18 (L) 01/27/2024     01/27/2024    BUN 24 (H) 01/27/2024    CREATININE 1.00 01/27/2024           Principal  Problem:    Arthritis due to other bacteria, left knee (CMS/[facility])  Active Problems:    Arthritis, septic (CMS/[facility])      Assessment and Plan   #Septic arthritis of the left knee status post intra-articular steroid injection  #Arthritis of left sternoclavicular joint  #Streptococcus mitis infection  #Fever, tachycardia    septic arthritis after left knee steroid injection  Culture from left knee aspiration was growing  Streptococcus mitis susceptible to Rocephin  Patient was initially on Rocephin but started having fever and tachycardia and was switched to cefepime and vancomycin  Blood cultures were repeated  Blood culture negative so far  Transthoracic echocardiogram was done and negative for vegetation  No leukocytosis  Elevated ESR and CRP  IR was consulted for left sternoclavicular joint arthritis with possible aspiration but was not amenable  Patient also had small left pleural effusion which is not amenable for drainage  Continue current antibiotics  Patient will need long-term IV antibiotics as per ID  Follow culture result    #Poorly controlled type 2 diabetes  Hemoglobin A1c was 15 on 12/23  Continue insulin sliding scale  Currently blood glucose ranges from 1 40-1 80  Will adjust insulin based on glycemic control    #History of hypertension  Patient is having elevated blood pressure  Resume lisinopril    #Hyperlipidemia  Continue home medication    #DVT prophylaxis on Lovenox  #PT/OT  Disposition SNF once medically ready     LOS: 7 days

## 2024-01-27 NOTE — NURSING NOTE
Patient requesting telemetry monitor be removed.   States that it pulls on the gown and is uncomfortable.  Explained why it was ordered and patient would still like it removed.  Monitor removed.

## 2024-01-27 NOTE — PROGRESS NOTES
Melchor Bergeron is a 63 y.o. male on day 6 of admission presenting with Arthritis due to other bacteria, left knee (CMS/HCC).     Subjective   Patient examined and seen. Alert and oriented x3, resting comfortably.  Patient denies chest pain, shortness of breath, palpitations, abdominal pain, fever or chills.  He states that he does feel weak when attempting to walk, but he has no other complaints.      Objective   PICC Line/Midline - when cultures are back and IV antibiotic finalized     Orthopedics request patient to be transferred down town due to complexity of case.  At this time - Orthopedics at [facility] declines transfer     Echo - Endocarditis does not appear to be differential / LVEF 60-65%      Infectious Disease is following ABX per ID     Repeat CXR 1/26 - no significant Pleural effusion, doubt empyema      Last Recorded Vitals  /81 (BP Location: Left arm, Patient Position: Sitting)   Pulse 105   Temp 36.4 °C (97.5 °F) (Temporal)   Resp 16   Wt 75.1 kg (165 lb 9.1 oz)   SpO2 98%   Intake/Output last 3 Shifts:     Intake/Output Summary (Last 24 hours) at 1/26/2024 0917  Last data filed at 1/26/2024 0915      Gross per 24 hour   Intake 3762.49 ml   Output 1500 ml   Net 2262.49 ml         Admission Weight  Weight: 75.1 kg (165 lb 9.1 oz) (01/20/24 0635)     Daily Weight  01/20/24 : 75.1 kg (165 lb 9.1 oz)     Image Results  XR chest 1 view  Narrative: Interpreted By:  Scott Arzola,   STUDY:  XR CHEST 1 VIEW;  1/26/2024 5:45 am      INDICATION:  Signs/Symptoms:Evaluate Pleural Effusion.      COMPARISON:  None      ACCESSION NUMBER(S):  [id number]      ORDERING CLINICIAN:  NICOLE PADILLA      TECHNIQUE:  Single frontal view of the chest; Portable technique      FINDINGS:      LINES AND TUBES: n/a      HEART AND MEDIASTINUM:  Heart size: Within normal limits  Thoracic aorta: Within expected limits  Ed[place] and nonvascular: within normal limits      PULMONARY VESSELS:  Within normal limits; no sign of edema       LUNGS AND AIRWAYS:  Linear opacity near left lung base is probably scar or atelectasis.  Right midlung granulomas. No acute disease      PLEURA:  Effusion: No substantial pleural effusion on either side  Pneumothorax: No substantial pneumothorax on either side  Other: n/a      BONES:  No acute findings      Impression: NO ACUTE DISEASE IN THE CHEST INCLUDING NO SUBSTANTIAL SIZED PLEURAL  EFFUSION      MACRO:  None      Signed by: Scott Arzola 1/26/2024 7:02 AM  Dictation workstation:   [id number]        Physical Exam  Constitutional: Well developed, awake/alert/oriented x3, cooperative  Respiratory/Thorax: Patent airways,  normal breath sounds  Cardiovascular: Regular, rate and rhythm, no murmurs,   Musculoskeletal: decrease range of motion to left knee , area covered with ACE, Left shoulder tenderness with palpitation and reproducible, left clavical/ S.C joint tenderness with palpation , mild edema to LLE to be expected   Extremities: normal extremities,  incision covered with ACE   Skin: warm, dry, intact except as ntoed   Neurological: alert/oriented x 3, speech clear,     Psychiatric: appropriate mood and behavior        Relevant Results     Scheduled medications  cefepime, 2 g, intravenous, q8h  docusate sodium, 100 mg, oral, BID  enoxaparin, 40 mg, subcutaneous, Daily  [Held by provider] ertugliflozin, 15 mg, oral, Daily  famotidine, 20 mg, oral, q12h  insulin lispro, 0-5 Units, subcutaneous, Before meals & nightly  [Held by provider] lisinopril, 10 mg, oral, Daily  rosuvastatin, 10 mg, oral, Nightly  [Held by provider] semaglutide, 3 mg, oral, Daily before breakfast  vancomycin, 750 mg, intravenous, q12h        Continuous medications  sodium chloride 0.9%, 50 mL/hr, Last Rate: 50 mL/hr (01/26/24 0915)        PRN medications  PRN medications: acetaminophen, cyclobenzaprine, dextrose 10 % in water (D10W), dextrose, glucagon, lidocaine, morphine, naloxone, ondansetron ODT **OR** ondansetron, oxyCODONE,  oxyCODONE           Results for orders placed or performed during the hospital encounter of 01/20/24 (from the past 24 hour(s))   ECG 12 lead   Result Value Ref Range     Ventricular Rate 124 BPM     Atrial Rate 124 BPM     CT Interval 136 ms     QRS Duration 82 ms     QT Interval 332 ms     QTC Calculation(Bazett) 476 ms     P Axis 73 degrees     R Axis -3 degrees     T Axis 66 degrees     QRS Count 20 beats     Q Onset 212 ms     P Onset 144 ms     P Offset 195 ms     T Offset 378 ms     QTC Fredericia 422 ms   POCT GLUCOSE   Result Value Ref Range     POCT Glucose 126 (H) 74 - 99 mg/dL   Lactate   Result Value Ref Range     Lactate 0.8 0.4 - 2.0 mmol/L   Blood Culture     Specimen: Peripheral Venipuncture; Blood culture   Result Value Ref Range     Blood Culture Loaded on Instrument - Culture in progress     Blood Culture     Specimen: Peripheral Venipuncture; Blood culture   Result Value Ref Range     Blood Culture Loaded on Instrument - Culture in progress     POCT GLUCOSE   Result Value Ref Range     POCT Glucose 149 (H) 74 - 99 mg/dL   Urinalysis with Reflex Culture and Microscopic   Result Value Ref Range     Color, Urine Straw Straw, Yellow     Appearance, Urine Clear Clear     Specific Gravity, Urine 1.013 1.005 - 1.035     pH, Urine 5.0 5.0, 5.5, 6.0, 6.5, 7.0, 7.5, 8.0     Protein, Urine NEGATIVE NEGATIVE mg/dL     Glucose, Urine >=500 (3+) (A) NEGATIVE mg/dL     Blood, Urine SMALL (1+) (A) NEGATIVE     Ketones, Urine 20 (1+) (A) NEGATIVE mg/dL     Bilirubin, Urine NEGATIVE NEGATIVE     Urobilinogen, Urine <2.0 <2.0 mg/dL     Nitrite, Urine NEGATIVE NEGATIVE     Leukocyte Esterase, Urine NEGATIVE NEGATIVE   Urinalysis Microscopic   Result Value Ref Range     WBC, Urine 1-5 1-5, NONE /HPF     RBC, Urine NONE NONE, 1-2, 3-5 /HPF     Mucus, Urine 1+ Reference range not established. /LPF   POCT GLUCOSE   Result Value Ref Range     POCT Glucose 156 (H) 74 - 99 mg/dL   Vancomycin   Result Value Ref Range      Vancomycin 9.0 5.0 - 20.0 ug/mL   CBC   Result Value Ref Range     WBC 9.3 4.4 - 11.3 x10*3/uL     nRBC 0.0 0.0 - 0.0 /100 WBCs     RBC 4.76 4.50 - 5.90 x10*6/uL     Hemoglobin 13.4 (L) 13.5 - 17.5 g/dL     Hematocrit 40.0 (L) 41.0 - 52.0 %     MCV 84 80 - 100 fL     MCH 28.2 26.0 - 34.0 pg     MCHC 33.5 32.0 - 36.0 g/dL     RDW 13.5 11.5 - 14.5 %     Platelets 190 150 - 450 x10*3/uL   Basic metabolic panel   Result Value Ref Range     Glucose 113 (H) 74 - 99 mg/dL     Sodium 131 (L) 136 - 145 mmol/L     Potassium 4.2 3.5 - 5.3 mmol/L     Chloride 98 98 - 107 mmol/L     Bicarbonate 21 21 - 32 mmol/L     Anion Gap 16 10 - 20 mmol/L     Urea Nitrogen 24 (H) 6 - 23 mg/dL     Creatinine 1.19 0.50 - 1.30 mg/dL     eGFR 69 >60 mL/min/1.73m*2     Calcium 8.4 (L) 8.6 - 10.3 mg/dL   POCT GLUCOSE   Result Value Ref Range     POCT Glucose 129 (H) 74 - 99 mg/dL   Vancomycin   Result Value Ref Range     Vancomycin 14.1 5.0 - 20.0 ug/mL                     Assessment/Plan      # Left Knee Arthritis w/ Septic Arthritis  I&D procedure / Arthoscopic I&D x2   Admit to Orthopedics - Transfer to Hospital Medicine   DVTp and Pain management per Ortho   PT/OT evaluation  and treatment   CBC/BMP as appropriate, review results  Pulmonary Toileting   Follow up as needed outpatient with Orthopedics      # Diabetes Mellitus Type 2  Episode of Hypoglycemia - asymptomatic - improved  and stable   Diet Cardiac/Diabetic - changed to Regular, poor appetite while inpatient and may be cause of hypoglycemia - acute   Continue Sliding Scale SSI AC/HS   A1C 15 12/2023  Encourage healthy lifestyle changes     # HTN / HLD  / Tachycardia  Continue home mediations as appropriate  Denies significant cardiac history  Hold Lisinopril at this time   Telemetry for arrhythmia monitoring    NS maintenance fluids 50ml/hr       #GERD  Continue PPI  Stay upright for 30minutes  Take pain medications with food     # Acute Left Shoulder Pain - Probable Septic  Arthritis  MRI :  Impression:     Focal fluid signal within the left 1st costochondral joint. There is  surrounding soft tissue edema and enhancement with some ill-defined  fluid. There is no soft tissue abscess. There is no overt bone  destruction. These findings are suspicious for septic arthritis.      Minimal edema across the left sternoclavicular joint. There is no  fluid in the joint space or widening of the joint. There is mild  surrounding soft tissue edema.      Bone marrow edema in the posterior left rib could be reactive is of  uncertain significance. There is no bone destruction or fracture.      Small volume left pleural effusion.      IR Procedure Pending - Will attempt aspirate      # Small Pleural Effusion   Per radiology per radiology, ultrasound of the chest shows no significant pleural effusion, large enough to tap. Remains on RA  No adventitious breath sounds heard   Repeat CXR - negative for significant Pleural effusion  no acute disease of the chest   If patient becomes, hypoxia further exploration will be warranted       Pulmonary will sign off at this time my.  Please call me if my services are needed.  Thank you for consult    Mike Cardoza MD

## 2024-01-27 NOTE — NURSING NOTE
Patient fell while getting back to bed from chair, witnessed by spouse and PCT Debby PENA. Patient was utilizing walker with non-slip socks on and tried to sit on bed but was not close enough and fell onto butt. Patient assisted back to bed, states he has no pain from fall, no new bruising noted, Dr. Cho notified and up to see patient.

## 2024-01-27 NOTE — SIGNIFICANT EVENT
Was informed by the nurse that patient had a fall.  I have seen and examined the patient at bedside.  Patient reported was trying to get out of the recliner and back to his bed, and was thinking that he already reached the bed tried to sit down but was not actually on the bed and fell down on his buttock.  He denied hitting his head.  He denied any pain in his hip, back, arms.  There is no injuries.  At this time I do not see any imaging were necessary.  Safety instruction were reviewed with the patient.  Call light was available for the patient to call for assistance at any time.  Will continue to follow fall precautions

## 2024-01-27 NOTE — PROGRESS NOTES
?  HISTORY OF PRESENT ILLNESS:      Presented left knee pain and swelling failing outpatient management status post cortisone injection 4 days prior developed severe pain about the knee         Cell count from right knee showed 129,000 white cells on 1/19/2023  Cultures from that fluid showing Streptococcus mitis oralis group     No crystals were seen in knee. Had washout.     Developed some left chest wall into shoulder pain  MRI showed rib joint swelling      Went  back to OR for knee swelling 1/24 . Founf to have hematoma    Had some fevers and tachycardia post op.:. Today ok. Feels tired. Some anterior rib pain    Current Medications:    Current Facility-Administered Medications   Medication Dose Route Frequency Provider Last Rate Last Admin    acetaminophen (Tylenol) tablet 650 mg  650 mg oral q4h PRN DEANN Raymundo   650 mg at 01/25/24 1323    cefepime (Maxipime) in dextrose 5 % water (D5W) 100 mL IV 2 g  2 g intravenous q8h Benji Chavarria MD   Stopped at 01/27/24 0640    cyclobenzaprine (Flexeril) tablet 10 mg  10 mg oral TID PRN DEANN Raymundo   10 mg at 01/25/24 0740    dextrose 10 % in water (D10W) infusion  0.3 g/kg/hr intravenous Once PRN DEANN Alex        dextrose 50 % injection 25 g  25 g intravenous q15 min PRN DEANN Alex   25 g at 01/22/24 0638    docusate sodium (Colace) capsule 100 mg  100 mg oral BID DEANN Raymundo   100 mg at 01/27/24 0803    enoxaparin (Lovenox) syringe 40 mg  40 mg subcutaneous Daily DEANN Raymundo   40 mg at 01/27/24 0803    [Held by provider] ertugliflozin (Steglatro) tablet 15 mg  15 mg oral Daily DEANN Alex        famotidine (Pepcid) tablet 20 mg  20 mg oral q12h DEANN Alex   20 mg at 01/27/24 0803    glucagon (Glucagen) injection 1 mg  1 mg intramuscular q15 min PRN DEANN Alex        insulin lispro (HumaLOG) injection 0-5 Units  0-5 Units  "subcutaneous Before meals & nightly DEANN Alex   1 Units at 01/27/24 1149    lidocaine 4 % patch 1 patch  1 patch transdermal Daily PRN DEANN Alex   1 patch at 01/22/24 1859    lisinopril tablet 10 mg  10 mg oral Daily DEANN Alex   10 mg at 01/27/24 0935    morphine injection 2 mg  2 mg intravenous q4h PRN SOLEDAD Raymundo-CNP   2 mg at 01/25/24 0842    naloxone (Narcan) injection 0.2 mg  0.2 mg intravenous q5 min PRN DEANN Raymundo        ondansetron ODT (Zofran-ODT) disintegrating tablet 4 mg  4 mg oral q8h PRN DEANN Raymundo        Or    ondansetron (Zofran) injection 4 mg  4 mg intravenous q8h PRN DEANN Raymundo   4 mg at 01/24/24 1717    oxyCODONE (Roxicodone) immediate release tablet 10 mg  10 mg oral q4h PRN SOLEDAD Raymundo-CNP   10 mg at 01/26/24 0741    oxyCODONE (Roxicodone) immediate release tablet 5 mg  5 mg oral q4h PRN SOLEDAD Raymundo-CNP   5 mg at 01/27/24 0805    rosuvastatin (Crestor) tablet 10 mg  10 mg oral Nightly DEANN Alex   10 mg at 01/26/24 2112    [Held by provider] semaglutide (Rybelsus) tablet 3 mg  3 mg oral Daily before breakfast DEANN Alex        vancomycin in dextrose 5 % (Vancocin) IVPB 750 mg  750 mg intravenous q12h Akila Berkowitz, PharmD   Stopped at 01/27/24 0518        Allergies:    Allergies   Allergen Reactions    Sulfa (Sulfonamide Antibiotics) Hives and Rash    Metformin Diarrhea          Review of Systems  14 system review is negative other than HPI     /79   Pulse 90   Temp 36.9 °C (98.4 °F)   Resp 16   Ht 1.753 m (5' 9\")   Wt 75.1 kg (165 lb 9.1 oz)   SpO2 97%   BMI 24.45 kg/m²    Physical Exam:  General: Patient appears ok at the present time. NAD  Skin: no new rashes  Left upper lateralk chest wall tenderness  HEENT:  Neck is supple, No subconjunctival hemorrhages, no oral exudates  Heart: S1 S2  Lungs: diminished " bases  Abdomen: soft, ND, NTTP,  Extrem: kneee swelling,            DATA:    .  Lab Results   Component Value Date    WBC 8.8 01/27/2024    HGB 12.5 (L) 01/27/2024    HCT 36.8 (L) 01/27/2024    MCV 83 01/27/2024     01/27/2024     Results from last 7 days   Lab Units 01/27/24  0454   SODIUM mmol/L 132*   POTASSIUM mmol/L 3.9   CHLORIDE mmol/L 102   CO2 mmol/L 18*   BUN mg/dL 24*   CREATININE mg/dL 1.00   CALCIUM mg/dL 8.5*   GLUCOSE mg/dL 132*     Susceptibility data from last 90 days.  Collected Specimen Info Organism Ceftriaxone Penicillin Tetracycline Vancomycin   01/20/24 Fluid from KNEE FINE NEEDLE ASPIRATION LEFT Streptococcus mitis/oralis group       01/19/24 Fluid from Synovial Streptococcus mitis/oralis group S S S S     Susceptibility data from last 90 days.  Collected Specimen Info Organism Ceftriaxone Penicillin Tetracycline Vancomycin   01/20/24 Fluid from KNEE FINE NEEDLE ASPIRATION LEFT Streptococcus mitis/oralis group       01/19/24 Fluid from Synovial Streptococcus mitis/oralis group S S S S             IMPRESSION:        Problem List Items Addressed This Visit          Infectious Diseases    Arthritis, septic (CMS/HCC) - Primary    Relevant Orders    Case Request Operating Room: Arthroscopy Knee (Completed)    Transthoracic Echo (TTE) Complete (Completed)    Transthoracic Echo (TTE) Complete (Completed)     Other Visit Diagnoses       Infection and inflammatory reaction due to vascular device, implant, and graft, initial encounter (CMS/[facility])        Relevant Orders    Sterile Fluid Culture/Smear (Completed)    Sepsis due to other specified Staphylococcus (CMS/[facility])               Estimated Creatinine Clearance: 76.6 mL/min (by C-G formula based on SCr of 1 mg/dL).      Poorly controlled DM likely major risk factor  here last hgba1c  15.       antibiotics expanded for SIRs yesterday , but could be from surgery itself, previous hematoma     Repeat cultures present     Apparently no significant  amount for pleural fluid to pursue thoracentesis to make sure not empyema, unclear if IR could  sample rib space as well    2 - d echo looks ok , endocarditis would be odd though with this organism with negative blood cultures. Looks like from review blood cultures done before antibiotics    Clinically no further metastatic sites of infection identified but if fevers continue, will likely need more imaging studies    If remains afebrile and cultures negative would switch back to ceftriaxone 2 gm daily     Discussed with orthopedics and medical team      Benji Chavarria MD

## 2024-01-27 NOTE — PROGRESS NOTES
DAILY PROGRESS NOTE      POD 3 left knee arthroscopic I/D.     Patient doing fairly well  Visit Vitals  /79   Pulse 90   Temp 36.9 °C (98.4 °F)   Resp 16      Temp (24hrs), Av.1 °C (98.7 °F), Min:36.9 °C (98.4 °F), Max:37.3 °C (99.1 °F)       Pain Control fair  No chest pain or shortness of breath.  No calf pain    Exam:  Extremity shows neuro vascular status intact. Flexion and extension intact on extremity.  There is no palpable effusion appreciated about the left knee.   Calves soft and non-tender without evidence of DVT.                  Labs reviewed:     CBC:   Lab Results   Component Value Date    WBC 8.8 2024    WBC 9.3 2024    WBC 8.2 2024    HGB 12.5 (L) 2024    HGB 13.4 (L) 2024    HGB 14.1 2024       BMP:    Lab Results   Component Value Date     (L) 2024     (L) 2024     (L) 2024    K 3.9 2024    K 4.2 2024    K 4.5 2024     2024    CL 98 2024     2024    CO2 18 (L) 2024    CO2 21 2024    CO2 20 (L) 2024    BUN 24 (H) 2024    BUN 24 (H) 2024    BUN 29 (H) 2024    CREATININE 1.00 2024    CREATININE 1.19 2024    CREATININE 1.18 2024          I&O  I/O last 3 completed shifts:  In: 4022.5 (53.6 mL/kg) [P.O.:800; I.V.:2472.5 (32.9 mL/kg); IV Piggyback:750]  Out: 2700 (36 mL/kg) [Urine:2700 (1 mL/kg/hr)]  Weight: 75.1 kg       Assessment:    Postop day 3 arthroscopic I&D left knee.  Patient is no longer febrile.   Still continues to have pain to left knee primarily with ambulation, movement and weight bearing     Plan:   PT OT: Patient okay for weightbearing as tolerated to left knee and range of motion  Continue IV atb per ID. Patient will require picc/midline placement prior to discharge for long-term antibiotic coverage   Continue pain control  Discharge planning: discharge disposition TBD.     Prabhakar Huerta, APRN-CNP  MD  1/27/2024 9:06 AM

## 2024-01-28 LAB
ANION GAP SERPL CALC-SCNC: 17 MMOL/L (ref 10–20)
BACTERIA BLD CULT: NORMAL
BACTERIA BLD CULT: NORMAL
BUN SERPL-MCNC: 22 MG/DL (ref 6–23)
CALCIUM SERPL-MCNC: 8.6 MG/DL (ref 8.6–10.3)
CHLORIDE SERPL-SCNC: 101 MMOL/L (ref 98–107)
CO2 SERPL-SCNC: 21 MMOL/L (ref 21–32)
CREAT SERPL-MCNC: 1.1 MG/DL (ref 0.5–1.3)
EGFRCR SERPLBLD CKD-EPI 2021: 76 ML/MIN/1.73M*2
ERYTHROCYTE [DISTWIDTH] IN BLOOD BY AUTOMATED COUNT: 13.8 % (ref 11.5–14.5)
GLUCOSE BLD MANUAL STRIP-MCNC: 148 MG/DL (ref 74–99)
GLUCOSE BLD MANUAL STRIP-MCNC: 153 MG/DL (ref 74–99)
GLUCOSE BLD MANUAL STRIP-MCNC: 185 MG/DL (ref 74–99)
GLUCOSE BLD MANUAL STRIP-MCNC: 198 MG/DL (ref 74–99)
GLUCOSE SERPL-MCNC: 136 MG/DL (ref 74–99)
HCT VFR BLD AUTO: 38.9 % (ref 41–52)
HGB BLD-MCNC: 12.8 G/DL (ref 13.5–17.5)
MCH RBC QN AUTO: 27.5 PG (ref 26–34)
MCHC RBC AUTO-ENTMCNC: 32.9 G/DL (ref 32–36)
MCV RBC AUTO: 84 FL (ref 80–100)
NRBC BLD-RTO: 0 /100 WBCS (ref 0–0)
PLATELET # BLD AUTO: 213 X10*3/UL (ref 150–450)
POTASSIUM SERPL-SCNC: 4 MMOL/L (ref 3.5–5.3)
RBC # BLD AUTO: 4.65 X10*6/UL (ref 4.5–5.9)
SODIUM SERPL-SCNC: 135 MMOL/L (ref 136–145)
VANCOMYCIN SERPL-MCNC: 20 UG/ML (ref 5–20)
WBC # BLD AUTO: 7.9 X10*3/UL (ref 4.4–11.3)

## 2024-01-28 PROCEDURE — 80202 ASSAY OF VANCOMYCIN: CPT | Performed by: INTERNAL MEDICINE

## 2024-01-28 PROCEDURE — 99231 SBSQ HOSP IP/OBS SF/LOW 25: CPT | Performed by: STUDENT IN AN ORGANIZED HEALTH CARE EDUCATION/TRAINING PROGRAM

## 2024-01-28 PROCEDURE — 2500000001 HC RX 250 WO HCPCS SELF ADMINISTERED DRUGS (ALT 637 FOR MEDICARE OP): Performed by: INTERNAL MEDICINE

## 2024-01-28 PROCEDURE — 36415 COLL VENOUS BLD VENIPUNCTURE: CPT | Performed by: INTERNAL MEDICINE

## 2024-01-28 PROCEDURE — 2500000001 HC RX 250 WO HCPCS SELF ADMINISTERED DRUGS (ALT 637 FOR MEDICARE OP)

## 2024-01-28 PROCEDURE — 2500000004 HC RX 250 GENERAL PHARMACY W/ HCPCS (ALT 636 FOR OP/ED): Performed by: INTERNAL MEDICINE

## 2024-01-28 PROCEDURE — 85027 COMPLETE CBC AUTOMATED: CPT | Performed by: STUDENT IN AN ORGANIZED HEALTH CARE EDUCATION/TRAINING PROGRAM

## 2024-01-28 PROCEDURE — 2500000004 HC RX 250 GENERAL PHARMACY W/ HCPCS (ALT 636 FOR OP/ED)

## 2024-01-28 PROCEDURE — 36415 COLL VENOUS BLD VENIPUNCTURE: CPT | Performed by: STUDENT IN AN ORGANIZED HEALTH CARE EDUCATION/TRAINING PROGRAM

## 2024-01-28 PROCEDURE — 2500000001 HC RX 250 WO HCPCS SELF ADMINISTERED DRUGS (ALT 637 FOR MEDICARE OP): Performed by: NURSE PRACTITIONER

## 2024-01-28 PROCEDURE — 82947 ASSAY GLUCOSE BLOOD QUANT: CPT

## 2024-01-28 PROCEDURE — 1200000002 HC GENERAL ROOM WITH TELEMETRY DAILY

## 2024-01-28 PROCEDURE — 2500000004 HC RX 250 GENERAL PHARMACY W/ HCPCS (ALT 636 FOR OP/ED): Performed by: NURSE PRACTITIONER

## 2024-01-28 PROCEDURE — 82310 ASSAY OF CALCIUM: CPT | Performed by: STUDENT IN AN ORGANIZED HEALTH CARE EDUCATION/TRAINING PROGRAM

## 2024-01-28 RX ORDER — IBUPROFEN 600 MG/1
600 TABLET ORAL ONCE
Status: COMPLETED | OUTPATIENT
Start: 2024-01-28 | End: 2024-01-28

## 2024-01-28 RX ORDER — KETOROLAC TROMETHAMINE 30 MG/ML
15 INJECTION, SOLUTION INTRAMUSCULAR; INTRAVENOUS EVERY 6 HOURS PRN
Status: DISCONTINUED | OUTPATIENT
Start: 2024-01-28 | End: 2024-02-01 | Stop reason: HOSPADM

## 2024-01-28 RX ADMIN — INSULIN LISPRO 1 UNITS: 100 INJECTION, SOLUTION INTRAVENOUS; SUBCUTANEOUS at 08:22

## 2024-01-28 RX ADMIN — OXYCODONE HYDROCHLORIDE 10 MG: 5 TABLET ORAL at 01:46

## 2024-01-28 RX ADMIN — FAMOTIDINE 20 MG: 20 TABLET, FILM COATED ORAL at 08:20

## 2024-01-28 RX ADMIN — OXYCODONE HYDROCHLORIDE 10 MG: 5 TABLET ORAL at 20:48

## 2024-01-28 RX ADMIN — VANCOMYCIN HYDROCHLORIDE 750 MG: 750 INJECTION, SOLUTION INTRAVENOUS at 04:27

## 2024-01-28 RX ADMIN — ENOXAPARIN SODIUM 40 MG: 40 INJECTION SUBCUTANEOUS at 08:21

## 2024-01-28 RX ADMIN — DOCUSATE SODIUM 100 MG: 100 CAPSULE, LIQUID FILLED ORAL at 08:21

## 2024-01-28 RX ADMIN — VANCOMYCIN HYDROCHLORIDE 750 MG: 750 INJECTION, SOLUTION INTRAVENOUS at 16:32

## 2024-01-28 RX ADMIN — ROSUVASTATIN CALCIUM 10 MG: 10 TABLET, FILM COATED ORAL at 20:40

## 2024-01-28 RX ADMIN — INSULIN LISPRO 1 UNITS: 100 INJECTION, SOLUTION INTRAVENOUS; SUBCUTANEOUS at 21:15

## 2024-01-28 RX ADMIN — IBUPROFEN 600 MG: 600 TABLET, FILM COATED ORAL at 05:28

## 2024-01-28 RX ADMIN — CYCLOBENZAPRINE HYDROCHLORIDE 10 MG: 10 TABLET, FILM COATED ORAL at 20:48

## 2024-01-28 RX ADMIN — INSULIN LISPRO 1 UNITS: 100 INJECTION, SOLUTION INTRAVENOUS; SUBCUTANEOUS at 16:29

## 2024-01-28 RX ADMIN — LISINOPRIL 10 MG: 10 TABLET ORAL at 08:20

## 2024-01-28 RX ADMIN — CEFEPIME 2 G: 2 INJECTION, POWDER, FOR SOLUTION INTRAVENOUS at 05:24

## 2024-01-28 RX ADMIN — CEFEPIME 2 G: 2 INJECTION, POWDER, FOR SOLUTION INTRAVENOUS at 22:28

## 2024-01-28 RX ADMIN — CEFEPIME 2 G: 2 INJECTION, POWDER, FOR SOLUTION INTRAVENOUS at 14:00

## 2024-01-28 ASSESSMENT — COGNITIVE AND FUNCTIONAL STATUS - GENERAL
DAILY ACTIVITIY SCORE: 18
MOBILITY SCORE: 12
TOILETING: A LITTLE
WALKING IN HOSPITAL ROOM: A LOT
STANDING UP FROM CHAIR USING ARMS: A LOT
WALKING IN HOSPITAL ROOM: A LOT
MOVING TO AND FROM BED TO CHAIR: A LOT
MOVING FROM LYING ON BACK TO SITTING ON SIDE OF FLAT BED WITH BEDRAILS: A LITTLE
TURNING FROM BACK TO SIDE WHILE IN FLAT BAD: A LOT
TURNING FROM BACK TO SIDE WHILE IN FLAT BAD: A LOT
CLIMB 3 TO 5 STEPS WITH RAILING: TOTAL
TOILETING: A LITTLE
MOVING TO AND FROM BED TO CHAIR: A LOT
DAILY ACTIVITIY SCORE: 18
CLIMB 3 TO 5 STEPS WITH RAILING: TOTAL
STANDING UP FROM CHAIR USING ARMS: A LOT
MOVING FROM LYING ON BACK TO SITTING ON SIDE OF FLAT BED WITH BEDRAILS: A LITTLE
DRESSING REGULAR UPPER BODY CLOTHING: A LITTLE
HELP NEEDED FOR BATHING: A LOT
DRESSING REGULAR LOWER BODY CLOTHING: A LOT
HELP NEEDED FOR BATHING: A LOT
MOBILITY SCORE: 12
DRESSING REGULAR UPPER BODY CLOTHING: A LITTLE
DRESSING REGULAR LOWER BODY CLOTHING: A LOT

## 2024-01-28 ASSESSMENT — PAIN SCALES - GENERAL
PAINLEVEL_OUTOF10: 0 - NO PAIN
PAINLEVEL_OUTOF10: 8
PAINLEVEL_OUTOF10: 2
PAINLEVEL_OUTOF10: 8
PAINLEVEL_OUTOF10: 4

## 2024-01-28 ASSESSMENT — PAIN - FUNCTIONAL ASSESSMENT
PAIN_FUNCTIONAL_ASSESSMENT: 0-10
PAIN_FUNCTIONAL_ASSESSMENT: 0-10

## 2024-01-28 NOTE — PROGRESS NOTES
Sent updated clinical information to [facility], who has reported that they have limited bed availability.  Per attending unsure of ADOD at this time and anticipate pt needing IV antibiotics and PICC line placed.  ELSIE Gomez

## 2024-01-28 NOTE — PROGRESS NOTES
"Vancomycin Dosing by Pharmacy- FOLLOW UP    Melchor Bergeron is a 63 y.o. year old male who Pharmacy has been consulted for vancomycin dosing for osteomyelitis/septic arthritis. Based on the patient's indication and renal status this patient is being dosed based on a goal AUC of 400-600.     Renal function is currently stable.    Current vancomycin dose: 750 mg given every 12 hours    Estimated vancomycin AUC on current dose: 522 mg/L.hr     Visit Vitals  /69   Pulse 81   Temp 36.2 °C (97.2 °F)   Resp 16        Lab Results   Component Value Date    CREATININE 1.10 01/28/2024    CREATININE 1.00 01/27/2024    CREATININE 1.19 01/26/2024    CREATININE 1.18 01/25/2024        Patient weight is 75.1 kg    No results found for: \"CULTURE\"     I/O last 3 completed shifts:  In: 1270 (16.9 mL/kg) [P.O.:120; I.V.:900 (12 mL/kg); IV Piggyback:250]  Out: 2200 (29.3 mL/kg) [Urine:2200 (0.8 mL/kg/hr)]  Weight: 75.1 kg   [unfilled]    No results found for: \"PATIENTTEMP\"     Assessment/Plan    Within goal AUC range. Continue current vancomycin regimen.    This dosing regimen is predicted by [employer] to result in the following pharmacokinetic parameters:    Regimen: 750 mg IV every 12 hours.  Exposure target: AUC24 (range)400-600 mg/L.hr   AUC24,ss: 522 mg/L.hr  Probability of AUC24 > 400: 94 %  Ctrough,ss: 17.1 mg/L  Probability of Ctrough,ss > 20: 26 %  Probability of nephrotoxicity (Lodise CARMEL 2009): 13 %    The next level will be obtained on 1/31/24 at 12pm. May be obtained sooner if clinically indicated.   Will continue to monitor renal function daily while on vancomycin and order serum creatinine at least every 48 hours if not already ordered.  Follow for continued vancomycin needs, clinical response, and signs/symptoms of toxicity.       Kristine E Steinert, [facility]           "

## 2024-01-28 NOTE — CARE PLAN
Problem: Fall/Injury  Goal: Verbalize understanding of risk factor reduction measures to prevent injury from fall in the home  Outcome: Progressing  Goal: Pace activities to prevent fatigue by end of the shift  Outcome: Progressing     Problem: Safety - Adult  Goal: Free from fall injury  Outcome: Progressing

## 2024-01-28 NOTE — PROGRESS NOTES
"Daily progress note  Melchor Bergeron is 63 y.o. male with history of osteoarthritis, hypertension, hyperlipidemia, type 2 diabetes poorly controlled, GERD presented with left knee swelling, patient had steroid injection to the left knee on 1/15/2024 and started having swelling after 48 hours     Subjective  Patient was seen and examined at bedside  Patient reports this pain over the left knee and pain over the left shoulder has decreased, no shortness of breath no fever      Vital signs in last 24 hours:  Temp:  [36 °C (96.8 °F)-36.8 °C (98.2 °F)] 36.4 °C (97.5 °F)  Heart Rate:  [] 81  Resp:  [16-18] 16  BP: (124-161)/(69-91) 130/71  /71 (Patient Position: Sitting)   Pulse 81   Temp 36.4 °C (97.5 °F)   Resp 16   Ht 1.753 m (5' 9\")   Wt 75.1 kg (165 lb 9.1 oz)   SpO2 97%   BMI 24.45 kg/m²    Intake/Output last 3 shifts:  I/O last 3 completed shifts:  In: 1270 (16.9 mL/kg) [P.O.:120; I.V.:900 (12 mL/kg); IV Piggyback:250]  Out: 2200 (29.3 mL/kg) [Urine:2200 (0.8 mL/kg/hr)]  Weight: 75.1 kg   Intake/Output this shift:  I/O this shift:  In: 100 [IV Piggyback:100]  Out: 400 [Urine:400]    Physical Exam  Constitutional:       General: He is not in acute distress.     Appearance: Normal appearance. He is not ill-appearing, toxic-appearing or diaphoretic.   HENT:      Head: Normocephalic and atraumatic.      Mouth/Throat:      Mouth: Mucous membranes are moist.      Pharynx: No oropharyngeal exudate.   Eyes:      Extraocular Movements: Extraocular movements intact.      Pupils: Pupils are equal, round, and reactive to light.   Cardiovascular:      Rate and Rhythm: Normal rate and regular rhythm.      Heart sounds: No murmur heard.  Pulmonary:      Effort: Pulmonary effort is normal. No respiratory distress.      Breath sounds: Normal breath sounds. No wheezing.   Abdominal:      General: Abdomen is flat. Bowel sounds are normal. There is no distension.      Tenderness: There is no abdominal tenderness. " There is no guarding or rebound.   Musculoskeletal:         General: No swelling.      Right lower leg: No edema.      Left lower leg: No edema.      Comments: Left knee clean dressing with Ace wrap able to move bilateral lower extremity, range of motion decreased over the right knee   Skin:     Findings: No lesion or rash.   Neurological:      General: No focal deficit present.      Mental Status: He is alert and oriented to person, place, and time.      Cranial Nerves: No cranial nerve deficit.      Motor: No weakness.   Psychiatric:         Mood and Affect: Mood normal.         Behavior: Behavior normal.         Current medication   Current Facility-Administered Medications   Medication Dose Route Frequency Provider Last Rate Last Admin    acetaminophen (Tylenol) tablet 650 mg  650 mg oral q4h PRN DEANN Raymundo   650 mg at 01/25/24 1323    cefepime (Maxipime) in dextrose 5 % water (D5W) 100 mL IV 2 g  2 g intravenous q8h Benji Chavarria MD   Stopped at 01/28/24 1430    cyclobenzaprine (Flexeril) tablet 10 mg  10 mg oral TID PRN DEANN Raymundo   10 mg at 01/25/24 0740    dextrose 10 % in water (D10W) infusion  0.3 g/kg/hr intravenous Once PRN DEANN Alex        dextrose 50 % injection 25 g  25 g intravenous q15 min PRN DEANN Alex   25 g at 01/22/24 0638    docusate sodium (Colace) capsule 100 mg  100 mg oral BID DEANN Raymundo   100 mg at 01/28/24 0821    enoxaparin (Lovenox) syringe 40 mg  40 mg subcutaneous Daily DEANN Raymundo   40 mg at 01/28/24 0821    [Held by provider] ertugliflozin (Steglatro) tablet 15 mg  15 mg oral Daily DEANN Alex        famotidine (Pepcid) tablet 20 mg  20 mg oral q12h SOLEDAD Alex-CNP   20 mg at 01/28/24 0820    glucagon (Glucagen) injection 1 mg  1 mg intramuscular q15 min PRN DEANN Alex        insulin lispro (HumaLOG) injection 0-5 Units  0-5 Units  subcutaneous Before meals & nightly DEANN Alex   1 Units at 01/28/24 0822    lidocaine 4 % patch 1 patch  1 patch transdermal Daily PRN DEANN Alex   1 patch at 01/22/24 1859    lisinopril tablet 10 mg  10 mg oral Daily DEANN Alex   10 mg at 01/28/24 0820    morphine injection 2 mg  2 mg intravenous q4h PRN DEANN Raymundo   2 mg at 01/25/24 0842    naloxone (Narcan) injection 0.2 mg  0.2 mg intravenous q5 min PRN DEANN Raymundo        ondansetron ODT (Zofran-ODT) disintegrating tablet 4 mg  4 mg oral q8h PRN DEANN Raymundo        Or    ondansetron (Zofran) injection 4 mg  4 mg intravenous q8h PRN DEANN Raymundo   4 mg at 01/24/24 1717    oxyCODONE (Roxicodone) immediate release tablet 10 mg  10 mg oral q4h PRN SOLEDAD Raymundo-CNP   10 mg at 01/28/24 0146    oxyCODONE (Roxicodone) immediate release tablet 5 mg  5 mg oral q4h PRN DEANN Raymundo   5 mg at 01/27/24 2022    rosuvastatin (Crestor) tablet 10 mg  10 mg oral Nightly DEANN Alex   10 mg at 01/27/24 2012    [Held by provider] semaglutide (Rybelsus) tablet 3 mg  3 mg oral Daily before breakfast DEANN Alex        vancomycin in dextrose 5 % (Vancocin) IVPB 750 mg  750 mg intravenous q12h Akila Berkowitz, PharmD   Stopped at 01/28/24 0512        Labs  Lab Results   Component Value Date    WBC 7.9 01/28/2024    HGB 12.8 (L) 01/28/2024    HCT 38.9 (L) 01/28/2024    MCV 84 01/28/2024     01/28/2024     Lab Results   Component Value Date    HGBA1C 15.0 (H) 12/15/2023     Lab Results   Component Value Date    GLUCOSE 136 (H) 01/28/2024    CALCIUM 8.6 01/28/2024     (L) 01/28/2024    K 4.0 01/28/2024    CO2 21 01/28/2024     01/28/2024    BUN 22 01/28/2024    CREATININE 1.10 01/28/2024           Principal Problem:    Arthritis due to other bacteria, left knee (CMS/[facility])  Active Problems:    Arthritis,  septic (CMS/[facility])      Assessment and Plan   #Septic arthritis of the left knee status post intra-articular steroid injection  #Arthritis of left sternoclavicular joint  #Streptococcus mitis infection  #Fever, tachycardia     septic arthritis after left knee steroid injection  Culture from left knee aspiration was growing  Streptococcus mitis susceptible to Rocephin  Patient was initially on Rocephin but started having fever and tachycardia and was switched to cefepime and vancomycin  Blood cultures were repeated  Blood culture negative so far  Transthoracic echocardiogram was done and negative for vegetation  No leukocytosis  Elevated ESR and CRP  IR was consulted for left sternoclavicular joint arthritis with possible aspiration but was not amenable  Patient also had small left pleural effusion which is not amenable for drainage  Continue current antibiotics  Patient will need long-term IV antibiotics as per ID  Follow culture result     #Poorly controlled type 2 diabetes  Hemoglobin A1c was 15 on 12/23  Continue insulin sliding scale  Currently blood glucose ranges from 1 40-1 80  Will adjust insulin based on glycemic control     #History of hypertension  Patient is having elevated blood pressure  Resume lisinopril     #Hyperlipidemia  Continue home medication     #DVT prophylaxis on Lovenox  #PT/OT  1/28/2024  Patient was seen and examined at bedside  No tachycardia or hypotension  No fever  Fair glycemic control  Cultures remain negative  Continue antibiotics for now  Final antibiotics as per ID  Patient will need a PICC line placement once antibiotic is decided and SNF placement     LOS: 8 days

## 2024-01-28 NOTE — PROGRESS NOTES
History: Melchor is postop day 4 from a second I&D for a left knee infection.  His pain is stable.  No new complaints.    Exam: Dressing is clear.  Normal swelling in the knee.  No redness or streaking.  No numbness or tingling.    Radiographs: None    Impression: Stable left knee I&D x 2 postop day 4    Plan: He continues to slowly improve in the knee looks stable.  He is being seen by the infectious disease staff for antibiotic coverage with likely PICC line placement.  He can weight-bear as tolerated with physical therapy and will continue to ice aggressively.  White blood cell count appears stable at 7.9.

## 2024-01-29 LAB
ANION GAP SERPL CALC-SCNC: 19 MMOL/L (ref 10–20)
BACTERIA BLD CULT: NORMAL
BACTERIA BLD CULT: NORMAL
BUN SERPL-MCNC: 24 MG/DL (ref 6–23)
CALCIUM SERPL-MCNC: 9.1 MG/DL (ref 8.6–10.3)
CHLORIDE SERPL-SCNC: 98 MMOL/L (ref 98–107)
CO2 SERPL-SCNC: 22 MMOL/L (ref 21–32)
CREAT SERPL-MCNC: 1.25 MG/DL (ref 0.5–1.3)
EGFRCR SERPLBLD CKD-EPI 2021: 65 ML/MIN/1.73M*2
ERYTHROCYTE [DISTWIDTH] IN BLOOD BY AUTOMATED COUNT: 13.6 % (ref 11.5–14.5)
GLUCOSE BLD MANUAL STRIP-MCNC: 151 MG/DL (ref 74–99)
GLUCOSE BLD MANUAL STRIP-MCNC: 152 MG/DL (ref 74–99)
GLUCOSE BLD MANUAL STRIP-MCNC: 190 MG/DL (ref 74–99)
GLUCOSE BLD MANUAL STRIP-MCNC: 217 MG/DL (ref 74–99)
GLUCOSE SERPL-MCNC: 152 MG/DL (ref 74–99)
HCT VFR BLD AUTO: 44.6 % (ref 41–52)
HGB BLD-MCNC: 14.7 G/DL (ref 13.5–17.5)
MCH RBC QN AUTO: 27.9 PG (ref 26–34)
MCHC RBC AUTO-ENTMCNC: 33 G/DL (ref 32–36)
MCV RBC AUTO: 85 FL (ref 80–100)
NRBC BLD-RTO: 0 /100 WBCS (ref 0–0)
PLATELET # BLD AUTO: 329 X10*3/UL (ref 150–450)
POTASSIUM SERPL-SCNC: 4 MMOL/L (ref 3.5–5.3)
RBC # BLD AUTO: 5.26 X10*6/UL (ref 4.5–5.9)
SODIUM SERPL-SCNC: 135 MMOL/L (ref 136–145)
WBC # BLD AUTO: 10.1 X10*3/UL (ref 4.4–11.3)

## 2024-01-29 PROCEDURE — 2500000001 HC RX 250 WO HCPCS SELF ADMINISTERED DRUGS (ALT 637 FOR MEDICARE OP): Performed by: NURSE PRACTITIONER

## 2024-01-29 PROCEDURE — 2500000004 HC RX 250 GENERAL PHARMACY W/ HCPCS (ALT 636 FOR OP/ED)

## 2024-01-29 PROCEDURE — 2500000004 HC RX 250 GENERAL PHARMACY W/ HCPCS (ALT 636 FOR OP/ED): Performed by: NURSE PRACTITIONER

## 2024-01-29 PROCEDURE — 82374 ASSAY BLOOD CARBON DIOXIDE: CPT | Performed by: STUDENT IN AN ORGANIZED HEALTH CARE EDUCATION/TRAINING PROGRAM

## 2024-01-29 PROCEDURE — 2500000001 HC RX 250 WO HCPCS SELF ADMINISTERED DRUGS (ALT 637 FOR MEDICARE OP)

## 2024-01-29 PROCEDURE — 97110 THERAPEUTIC EXERCISES: CPT | Mod: GP,CQ

## 2024-01-29 PROCEDURE — 1100000001 HC PRIVATE ROOM DAILY

## 2024-01-29 PROCEDURE — 2500000004 HC RX 250 GENERAL PHARMACY W/ HCPCS (ALT 636 FOR OP/ED): Performed by: STUDENT IN AN ORGANIZED HEALTH CARE EDUCATION/TRAINING PROGRAM

## 2024-01-29 PROCEDURE — 99231 SBSQ HOSP IP/OBS SF/LOW 25: CPT | Performed by: STUDENT IN AN ORGANIZED HEALTH CARE EDUCATION/TRAINING PROGRAM

## 2024-01-29 PROCEDURE — 85027 COMPLETE CBC AUTOMATED: CPT | Performed by: STUDENT IN AN ORGANIZED HEALTH CARE EDUCATION/TRAINING PROGRAM

## 2024-01-29 PROCEDURE — 97116 GAIT TRAINING THERAPY: CPT | Mod: GP,CQ

## 2024-01-29 PROCEDURE — 36415 COLL VENOUS BLD VENIPUNCTURE: CPT | Performed by: STUDENT IN AN ORGANIZED HEALTH CARE EDUCATION/TRAINING PROGRAM

## 2024-01-29 PROCEDURE — 97535 SELF CARE MNGMENT TRAINING: CPT | Mod: GO,CO

## 2024-01-29 PROCEDURE — 2500000004 HC RX 250 GENERAL PHARMACY W/ HCPCS (ALT 636 FOR OP/ED): Performed by: INTERNAL MEDICINE

## 2024-01-29 PROCEDURE — 82947 ASSAY GLUCOSE BLOOD QUANT: CPT

## 2024-01-29 RX ADMIN — OXYCODONE HYDROCHLORIDE 10 MG: 5 TABLET ORAL at 08:48

## 2024-01-29 RX ADMIN — DEXTROSE MONOHYDRATE 2 G: 5 INJECTION INTRAVENOUS at 20:27

## 2024-01-29 RX ADMIN — OXYCODONE HYDROCHLORIDE 10 MG: 5 TABLET ORAL at 03:01

## 2024-01-29 RX ADMIN — CEFEPIME 2 G: 2 INJECTION, POWDER, FOR SOLUTION INTRAVENOUS at 05:43

## 2024-01-29 RX ADMIN — LISINOPRIL 10 MG: 10 TABLET ORAL at 08:47

## 2024-01-29 RX ADMIN — ROSUVASTATIN CALCIUM 10 MG: 10 TABLET, FILM COATED ORAL at 20:27

## 2024-01-29 RX ADMIN — ENOXAPARIN SODIUM 40 MG: 40 INJECTION SUBCUTANEOUS at 08:48

## 2024-01-29 RX ADMIN — DOCUSATE SODIUM 100 MG: 100 CAPSULE, LIQUID FILLED ORAL at 20:27

## 2024-01-29 RX ADMIN — FAMOTIDINE 20 MG: 20 TABLET, FILM COATED ORAL at 08:47

## 2024-01-29 RX ADMIN — INSULIN LISPRO 1 UNITS: 100 INJECTION, SOLUTION INTRAVENOUS; SUBCUTANEOUS at 11:19

## 2024-01-29 RX ADMIN — INSULIN LISPRO 1 UNITS: 100 INJECTION, SOLUTION INTRAVENOUS; SUBCUTANEOUS at 08:52

## 2024-01-29 RX ADMIN — INSULIN LISPRO 2 UNITS: 100 INJECTION, SOLUTION INTRAVENOUS; SUBCUTANEOUS at 18:03

## 2024-01-29 RX ADMIN — VANCOMYCIN HYDROCHLORIDE 750 MG: 750 INJECTION, SOLUTION INTRAVENOUS at 03:01

## 2024-01-29 RX ADMIN — CEFEPIME 2 G: 2 INJECTION, POWDER, FOR SOLUTION INTRAVENOUS at 14:16

## 2024-01-29 RX ADMIN — FAMOTIDINE 20 MG: 20 TABLET, FILM COATED ORAL at 20:27

## 2024-01-29 ASSESSMENT — ACTIVITIES OF DAILY LIVING (ADL): HOME_MANAGEMENT_TIME_ENTRY: 30

## 2024-01-29 ASSESSMENT — PAIN - FUNCTIONAL ASSESSMENT
PAIN_FUNCTIONAL_ASSESSMENT: 0-10

## 2024-01-29 ASSESSMENT — COGNITIVE AND FUNCTIONAL STATUS - GENERAL
DAILY ACTIVITIY SCORE: 17
TURNING FROM BACK TO SIDE WHILE IN FLAT BAD: A LITTLE
DRESSING REGULAR LOWER BODY CLOTHING: A LOT
TOILETING: A LITTLE
MOBILITY SCORE: 12
MOBILITY SCORE: 13
HELP NEEDED FOR BATHING: A LOT
CLIMB 3 TO 5 STEPS WITH RAILING: TOTAL
CLIMB 3 TO 5 STEPS WITH RAILING: TOTAL
WALKING IN HOSPITAL ROOM: A LOT
DRESSING REGULAR UPPER BODY CLOTHING: A LITTLE
DAILY ACTIVITIY SCORE: 17
MOVING FROM LYING ON BACK TO SITTING ON SIDE OF FLAT BED WITH BEDRAILS: A LITTLE
STANDING UP FROM CHAIR USING ARMS: A LOT
STANDING UP FROM CHAIR USING ARMS: A LOT
DRESSING REGULAR UPPER BODY CLOTHING: A LITTLE
MOVING TO AND FROM BED TO CHAIR: A LOT
MOVING TO AND FROM BED TO CHAIR: A LOT
TURNING FROM BACK TO SIDE WHILE IN FLAT BAD: A LOT
PERSONAL GROOMING: A LITTLE
TOILETING: A LITTLE
PERSONAL GROOMING: A LITTLE
MOVING FROM LYING ON BACK TO SITTING ON SIDE OF FLAT BED WITH BEDRAILS: A LITTLE
WALKING IN HOSPITAL ROOM: A LOT
HELP NEEDED FOR BATHING: A LOT
DRESSING REGULAR LOWER BODY CLOTHING: A LOT

## 2024-01-29 ASSESSMENT — PAIN SCALES - GENERAL
PAINLEVEL_OUTOF10: 7
PAINLEVEL_OUTOF10: 2
PAINLEVEL_OUTOF10: 3
PAINLEVEL_OUTOF10: 8
PAINLEVEL_OUTOF10: 3

## 2024-01-29 ASSESSMENT — PAIN DESCRIPTION - DESCRIPTORS: DESCRIPTORS: ACHING;TIGHTNESS

## 2024-01-29 NOTE — CONSULTS
Vancomycin Dosing by Pharmacy- Cessation of Therapy    Consult to pharmacy for vancomycin dosing has been discontinued by the prescriber, pharmacy will sign off at this time.    Please call pharmacy if there are further questions or re-enter a consult if vancomycin is resumed.     Akila Berkowitz, PharmD

## 2024-01-29 NOTE — PROGRESS NOTES
History: Melchor is postop day 5 from a second I&D for a left knee infection.  His pain is stable.  No new complaints.    Exam: Dressing is clear.  Normal swelling in the knee.  No redness or streaking.  No numbness or tingling.    Radiographs: None    Impression: Stable left knee I&D x 2 postop day 4    Plan: He continues to slowly improve in the knee looks stable.  He is being seen by the infectious disease staff for antibiotic coverage with likely PICC line placement.  He can weight-bear as tolerated with physical therapy and will continue to ice aggressively.

## 2024-01-29 NOTE — PROGRESS NOTES
Physical Therapy    Physical Therapy Treatment    Patient Name: Melchor Bergeron  MRN: [id number]  Today's Date: 1/29/2024  Time Calculation  Start Time: 0954  Stop Time: 1034  Time Calculation (min): 40 min       Assessment/Plan   PT Assessment  PT Assessment Results: Decreased strength, Decreased endurance, Decreased mobility  Rehab Prognosis: Good  Evaluation/Treatment Tolerance: Patient limited by pain  Medical Staff Made Aware: Yes  End of Session Communication: Bedside nurse  Assessment Comment: pt would benefit from continued therapy to improve functional mobility , strength and safety  End of Session Patient Position: Up in chair, Alarm on  PT Plan  Inpatient/Swing Bed or Outpatient: Inpatient  Treatment/Interventions: Bed mobility, Transfer training, Gait training, Therapeutic exercise, Home exercise program  PT Plan: Skilled PT  PT Frequency: 5 times per week  PT Discharge Recommendations: Moderate intensity level of continued care  Equipment Recommended upon Discharge:  (pt states that he has a rollator that he plans to use when he returns to home) PT Recommended Transfer Status: Assist x1, Assistive device    General Visit Information:   PT  Visit  PT Received On: 01/29/24  General  Reason for Referral: I&D L knee  Family/Caregiver Present: Yes  Caregiver Feedback: significant other present and supportive  Prior to Session Communication: Bedside nurse (cleared by nursing to participate , nursing stating pt has been medicated for pain)  Patient Position Received: Bed, 3 rail up, Alarm on  Preferred Learning Style: auditory, visual, written  General Comment: agreeable to participate , states he has been doing some exercises while in the chair    General Observations:   General Observation: pt reports he  had a fall yesterday while gettingback to bed     reports he thinks he was not close enough to the bed and just became too tired , pt cleared by nursing to participate    Subjective      Precautions:  Precautions  LE Weight Bearing Status: Weight Bearing as Tolerated (L LE)  Medical Precautions: Fall precautions  Post-Surgical Precautions:  (Cleared for left knee ROM, KI not needed per ortho NP)  Precautions Comment: OK for ROM, KI as needed    Vital Signs:  Vital Signs  Heart Rate: 81  SpO2: 99 %  Patient Position: Sitting  Objective     Pain:  Pain Assessment  Pain Assessment: 0-10  Pain Score: 3 (increasing to 6-7/10 with WB)  Pain Type: Acute pain  Pain Location: Knee  Pain Orientation: Left  Pain Descriptors: Aching, Tightness  Pain Frequency: Intermittent  Pain Interventions: Cold pack    Cognition:  Cognition  Overall Cognitive Status: Within Functional Limits        Activity Tolerance:  Activity Tolerance  Endurance: Decreased tolerance for upright activites, Endurance does not limit participation in activity    Treatments:  Therapeutic Exercise  Therapeutic Exercise Performed: Yes  Therapeutic Exercise Activity 1: instructed in and performed ther ex including AP, QS, GS, LAQ, SAQ, Hip flexion, pt having difficulty with SAQ and with extension        Bed Mobility  Bed Mobility: Yes  Bed Mobility 1  Bed Mobility Comments 1: transfers supine to sit with WW and Min A , head of bed elevated , using bed rails to assist  Ambulation/Gait Training  Ambulation/Gait Training Performed: Yes  Ambulation/Gait Training 1  Surface 1: Level tile  Device 1: Rolling walker  Comments/Distance (ft) 1: ambualting 15 ft and 10 ft with WW and Min A , pt needs vc forwalker placement , upright posture and to increase WB through L LE. Pt ambulating to and from commode , did not use but pt  wanted to see if his endurance has improived (pt gait is improved vs last vist , pt appears to be increasing WB through L LE)  Transfers  Transfer:  (transfers sit to stand with Mod A and WW progressing to Min A with vc , performed 4 trials. Pt able to stand at sink and wash hands with Min A)  Transfer 1  Technique 1: Sit to  stand, Stand to sit  Trials/Comments 1: transfers sit <--> stand with WW and MIn A with Vc for tecchnique , pt needs constant cueing for technique and safety  Stairs  Stairs: No       Outcome Measures:  [facility] Basic Mobility  Turning from your back to your side while in a flat bed without using bedrails: A little  Moving from lying on your back to sitting on the side of a flat bed without using bedrails: A lot  Moving to and from bed to chair (including a wheelchair): A lot  Standing up from a chair using your arms (e.g. wheelchair or bedside chair): A lot  To walk in hospital room: A lot  Climbing 3-5 steps with railing: Total  Basic Mobility - Total Score: 12  Education Documentation  Precautions, taught by Isabelle Ledezma PTA at 1/27/2024  2:48 PM.  Learner: Family, Patient  Readiness: Acceptance  Method: Explanation, Demonstration  Response: Verbalizes Understanding, Needs Reinforcement    Mobility Training, taught by Isabelle Ledezma PTA at 1/27/2024  2:48 PM.  Learner: Family, Patient  Readiness: Acceptance  Method: Explanation, Demonstration  Response: Verbalizes Understanding, Needs Reinforcement    Precautions, taught by Isabelle Ledezma PTA at 1/26/2024  4:34 PM.  Learner: Patient  Readiness: Acceptance  Method: Explanation  Response: Verbalizes Understanding, Needs Reinforcement    Mobility Training, taught by Isabelle Ledezma PTA at 1/26/2024  4:34 PM.  Learner: Patient  Readiness: Acceptance  Method: Explanation  Response: Verbalizes Understanding, Needs Reinforcement    Precautions, taught by Isabelle Ledezma PTA at 1/23/2024  3:19 PM.  Learner: Significant Other, Family, Patient  Readiness: Acceptance  Method: Explanation, Demonstration  Response: Verbalizes Understanding, Needs Reinforcement    Mobility Training, taught by Isabelle Ledezma PTA at 1/23/2024  3:19 PM.  Learner: Significant Other, Family, Patient  Readiness: Acceptance  Method: Explanation, Demonstration  Response: Verbalizes  Understanding, Needs Reinforcement    Precautions, taught by Isabelle Ledezma PTA at 1/22/2024 11:50 AM.  Learner: Patient  Readiness: Acceptance  Method: Explanation  Response: Needs Reinforcement, Verbalizes Understanding    Mobility Training, taught by Isabelle Ledezma PTA at 1/22/2024 11:50 AM.  Learner: Patient  Readiness: Acceptance  Method: Explanation  Response: Needs Reinforcement, Verbalizes Understanding    Education Comments  No comments found.        EDUCATION:  Outpatient Education  Individual(s) Educated: Patient, Significant Other  Education Provided: Body Mechanics, Fall Risk, Home Exercise Program, Posture  Patient Response to Education: Patient/Caregiver Verbalized Understanding of Information    Encounter Problems       Encounter Problems (Active)       PT Problem       Pt will demonstrate mod I with bed mobility to edge of bed.   (Progressing)       Start:  01/21/24    Expected End:  02/04/24            Pt will demonstrate mod I  with sit to stand/chair transfers with FWW.   (Progressing)       Start:  01/21/24    Expected End:  02/04/24            Pt will ambulate 50 feet with FWW mod I NWB L LE .   (Not met)       Start:  01/21/24    Expected End:  02/04/24    Resolved:  01/25/24    Updated to: Pt will ambulate 100 feet with FWW mod I WBAT L LE .    Update reason: wt bearing change          Pt to demo improved BLE strength by being able to complete supine/seated thera ex 2x20 BLEs with 4 or less rest breaks .   (Progressing)       Start:  01/21/24    Expected End:  02/04/24            Pt will ambulate 100 feet with FWW mod I WBAT L LE . (Progressing)       Start:  01/25/24    Expected End:  02/04/24

## 2024-01-29 NOTE — PROGRESS NOTES
Occupational Therapy    OT Treatment    Patient Name: Melchor Bergeron  MRN: [id number]  Today's Date: 1/29/2024  Time Calculation  Start Time: 1321  Stop Time: 1351  Time Calculation (min): 30 min         Assessment:  End of Session Communication: Bedside nurse  End of Session Patient Position: Up in chair, Bed, 2 rail up     Plan:  Treatment Interventions: ADL retraining  OT Frequency: 3 times per week  OT Discharge Recommendations: Low intensity level of continued care, Moderate intensity level of continued care  Equipment Recommended upon Discharge: Wheeled walker  Treatment Interventions: ADL retraining    Subjective   Previous Visit Info:  OT Last Visit  OT Received On: 01/29/24  General:  General  Family/Caregiver Present: Yes  Caregiver Feedback: pt sister present throughout  Prior to Session Communication: Bedside nurse  Patient Position Received: Bed, 3 rail up, Alarm on  General Comment: pt agreeable to OT tx, pt WBAT LLE , OK ROM and KI needed for comfort  Precautions:  Medical Precautions: Fall precautions  Vital Signs:     Pain:  Pain Assessment  Pain Assessment: 0-10 (unrated pain in L knee)         Activities of Daily Living: Grooming  Grooming Comments: pt standing at sink demos F supported standing balance while engaging ing grooming tasks    LE Dressing  LE Dressing:  (pt required min A for LB dressing tasks)  Functional Standing Tolerance:  Functional Standing Tolerance Comments: pt demos F supported standing balance while performing clothing mgmt tasks  Bed Mobility/Transfers: Transfers  Transfer:  (pt performing various functional transfers with use of FWW and required min A for maintaining upright balance. pt educated on safe hand placement when performing sit  < > stand transfers)    Outcome Measures:[facility] Daily Activity  Putting on and taking off regular lower body clothing: A lot  Bathing (including washing, rinsing, drying): A lot  Putting on and taking off regular upper body clothing: A  little  Toileting, which includes using toilet, bedpan or urinal: A little  Taking care of personal grooming such as brushing teeth: A little  Eating Meals: None  Daily Activity - Total Score: 17        Education Documentation  ADL Training, taught by MANUEL Marroquin at 1/29/2024  2:08 PM.  Learner: Patient  Readiness: Acceptance  Method: Explanation  Response: Needs Reinforcement  Comment: pt educated on OT POC and BUE strengthening exercises    Education Comments  No comments found.        OP EDUCATION:       Goals:  Encounter Problems       Encounter Problems (Active)       OT Goals       OT Goal 1 (Progressing)       Start:  01/21/24    Expected End:  02/04/24       Patient will complete UB bathing and dressing with Mod I/Independent with AE as needed         OT Goal 2 (Progressing)       Start:  01/21/24    Expected End:  02/04/24       Patient will complete LB bathing and dressing with CGA/SBA and with AE as needed.         OT Goal 3 (Progressing)       Start:  01/21/24    Expected End:  02/04/24       Patient will complete all transfers for ADLs and functional activities with CGA/Min A with FWW as needed.

## 2024-01-29 NOTE — PROGRESS NOTES
"Daily progress note    Melchor Bergeron is 63 y.o. male with history of osteoarthritis, hypertension, hyperlipidemia, type 2 diabetes poorly controlled, GERD presented with left knee swelling, patient had steroid injection to the left knee on 1/15/2024 and started having swelling after 48 hours     Subjective  Patient seen and examined at bedside  Cultures have been negative for 3 days  No fever or leukocytosis  Patient had episode of fever On 1/25 and antibiotic was switched from Rocephin to cefepime and vancomycin  Cultures have been negative so far  Patient reports some pain and swelling over the left knee    Vital signs in last 24 hours:  Temp:  [36.3 °C (97.3 °F)-37.1 °C (98.8 °F)] 36.9 °C (98.4 °F)  Heart Rate:  [91-93] 91  Resp:  [16-18] 18  BP: (130-154)/(71-89) 151/84  /84   Pulse 91   Temp 36.9 °C (98.4 °F)   Resp 18   Ht 1.753 m (5' 9\")   Wt 75.1 kg (165 lb 9.1 oz)   SpO2 94%   BMI 24.45 kg/m²    Intake/Output last 3 shifts:  I/O last 3 completed shifts:  In: 250 (3.3 mL/kg) [IV Piggyback:250]  Out: 1650 (22 mL/kg) [Urine:1650 (0.6 mL/kg/hr)]  Weight: 75.1 kg   Intake/Output this shift:  No intake/output data recorded.    Physical Exam  Constitutional:       General: He is not in acute distress.     Appearance: Normal appearance. He is not ill-appearing, toxic-appearing or diaphoretic.   HENT:      Head: Normocephalic and atraumatic.      Mouth/Throat:      Mouth: Mucous membranes are moist.      Pharynx: No oropharyngeal exudate.   Eyes:      Extraocular Movements: Extraocular movements intact.      Pupils: Pupils are equal, round, and reactive to light.   Cardiovascular:      Rate and Rhythm: Normal rate and regular rhythm.      Heart sounds: No murmur heard.  Pulmonary:      Effort: Pulmonary effort is normal. No respiratory distress.      Breath sounds: Normal breath sounds. No wheezing.   Abdominal:      General: Abdomen is flat. Bowel sounds are normal. There is no distension.      " Tenderness: There is no abdominal tenderness. There is no guarding or rebound.   Musculoskeletal:         General: No swelling.      Right lower leg: No edema.      Left lower leg: No edema.      Comments: Left knee dressing with Ace wrap reduced range of motion no significant tenderness or swelling no hematoma   Skin:     Findings: No lesion or rash.   Neurological:      General: No focal deficit present.      Mental Status: He is alert and oriented to person, place, and time.      Cranial Nerves: No cranial nerve deficit.      Motor: No weakness.   Psychiatric:         Mood and Affect: Mood normal.         Behavior: Behavior normal.         Current medication   Current Facility-Administered Medications   Medication Dose Route Frequency Provider Last Rate Last Admin    acetaminophen (Tylenol) tablet 650 mg  650 mg oral q4h PRN DEANN Raymundo   650 mg at 01/25/24 1323    cefepime (Maxipime) in dextrose 5 % water (D5W) 100 mL IV 2 g  2 g intravenous q8h Benji Chavarria MD   Stopped at 01/29/24 0613    cyclobenzaprine (Flexeril) tablet 10 mg  10 mg oral TID PRN DEANN Raymundo   10 mg at 01/28/24 2048    dextrose 10 % in water (D10W) infusion  0.3 g/kg/hr intravenous Once PRN DEANN Alex        dextrose 50 % injection 25 g  25 g intravenous q15 min PRN DEANN Alex   25 g at 01/22/24 0638    docusate sodium (Colace) capsule 100 mg  100 mg oral BID DEANN Raymundo   100 mg at 01/28/24 0821    enoxaparin (Lovenox) syringe 40 mg  40 mg subcutaneous Daily DEANN Raymundo   40 mg at 01/29/24 0848    [Held by provider] ertugliflozin (Steglatro) tablet 15 mg  15 mg oral Daily DEANN Alex        famotidine (Pepcid) tablet 20 mg  20 mg oral q12h DEANN Alex   20 mg at 01/29/24 0847    glucagon (Glucagen) injection 1 mg  1 mg intramuscular q15 min PRN DEANN Alex        insulin lispro (HumaLOG) injection  0-5 Units  0-5 Units subcutaneous Before meals & nightly DEANN Alex   1 Units at 01/29/24 0852    ketorolac (Toradol) injection 15 mg  15 mg intravenous q6h PRN Viri Gonzalez MD        lidocaine 4 % patch 1 patch  1 patch transdermal Daily PRN DEANN Alex   1 patch at 01/22/24 1859    lisinopril tablet 10 mg  10 mg oral Daily DEANN Alex   10 mg at 01/29/24 0847    morphine injection 2 mg  2 mg intravenous q4h PRN SOLEDAD Raymundo-CNP   2 mg at 01/25/24 0842    naloxone (Narcan) injection 0.2 mg  0.2 mg intravenous q5 min PRN DEANN Raymundo        ondansetron ODT (Zofran-ODT) disintegrating tablet 4 mg  4 mg oral q8h PRN DEANN Raymundo        Or    ondansetron (Zofran) injection 4 mg  4 mg intravenous q8h PRN SOLEDAD Raymundo-CNP   4 mg at 01/24/24 1717    oxyCODONE (Roxicodone) immediate release tablet 10 mg  10 mg oral q4h PRN SOLEDAD Raymundo-CNP   10 mg at 01/29/24 0848    oxyCODONE (Roxicodone) immediate release tablet 5 mg  5 mg oral q4h PRN SOLEDAD Raymundo-CNP   5 mg at 01/27/24 2022    rosuvastatin (Crestor) tablet 10 mg  10 mg oral Nightly SOLEDAD Alex-CNP   10 mg at 01/28/24 2040    [Held by provider] semaglutide (Rybelsus) tablet 3 mg  3 mg oral Daily before breakfast DEANN Alex        vancomycin in dextrose 5 % (Vancocin) IVPB 750 mg  750 mg intravenous q12h Akila Berkowitz, PharmD   Stopped at 01/29/24 0346        Labs  Lab Results   Component Value Date    WBC 7.9 01/28/2024    HGB 12.8 (L) 01/28/2024    HCT 38.9 (L) 01/28/2024    MCV 84 01/28/2024     01/28/2024     Lab Results   Component Value Date    HGBA1C 15.0 (H) 12/15/2023     Lab Results   Component Value Date    GLUCOSE 136 (H) 01/28/2024    CALCIUM 8.6 01/28/2024     (L) 01/28/2024    K 4.0 01/28/2024    CO2 21 01/28/2024     01/28/2024    BUN 22 01/28/2024    CREATININE 1.10 01/28/2024            Principal Problem:    Arthritis due to other bacteria, left knee (CMS/[facility])  Active Problems:    Arthritis, septic (CMS/[facility])      Assessment and Plan   #Septic arthritis of the left knee status post intra-articular steroid injection  #Arthritis of left sternoclavicular joint  #Streptococcus mitis infection  #Fever, tachycardia     septic arthritis after left knee steroid injection  Culture from left knee aspiration was growing  Streptococcus mitis susceptible to Rocephin  Patient was initially on Rocephin but started having fever and tachycardia and was switched to cefepime and vancomycin  Blood cultures were repeated  Blood culture negative so far  Transthoracic echocardiogram was done and negative for vegetation  No leukocytosis  Elevated ESR and CRP  IR was consulted for left sternoclavicular joint arthritis with possible aspiration but was not amenable  Patient also had small left pleural effusion which is not amenable for drainage  Continue current antibiotics  Patient will need long-term IV antibiotics as per ID  Follow culture result     #Poorly controlled type 2 diabetes  Hemoglobin A1c was 15 on 12/23  Continue insulin sliding scale  Currently blood glucose ranges from 1 40-1 80  Will adjust insulin based on glycemic control     #History of hypertension  Patient is having elevated blood pressure  Resume lisinopril     #Hyperlipidemia  Continue home medication     #DVT prophylaxis on Lovenox  #PT/OT  1/28/2024  Patient was seen and examined at bedside  No tachycardia or hypotension  No fever  Fair glycemic control  Cultures remain negative  Continue antibiotics for now  Final antibiotics as per ID  Patient will need a PICC line placement once antibiotic is decided and SNF placement    1/29/2024  Patient was seen and examined at bedside  Cultures remain negative  Culture from arthrocentesis growing Streptococcus  Negative blood cultures so far, patient has been fever free for 48 hours  Discussed with  infectious disease, recommended to switch antibiotics back to Rocephin 2 g daily and monitor  If patient is stable on 2 g IV daily Rocephin will need PICC/midline prior to discharge  Discussed with patient and patient's significant other by the bedside   LOS: 9 days

## 2024-01-30 ENCOUNTER — OUTSIDE SERVICES (OUTPATIENT)
Dept: INFECTIOUS DISEASES | Age: 64
End: 2024-01-30
Payer: COMMERCIAL

## 2024-01-30 DIAGNOSIS — R79.89 ELEVATED SERUM CREATININE: ICD-10-CM

## 2024-01-30 DIAGNOSIS — A49.1 INFECTION DUE TO STREPTOCOCCUS MITIS GROUP: ICD-10-CM

## 2024-01-30 DIAGNOSIS — M79.605 LEFT LEG PAIN: ICD-10-CM

## 2024-01-30 DIAGNOSIS — M00.262 STREPTOCOCCAL ARTHRITIS OF LEFT KNEE (HCC): Primary | ICD-10-CM

## 2024-01-30 LAB
ANION GAP SERPL CALC-SCNC: 17 MMOL/L (ref 10–20)
BUN SERPL-MCNC: 30 MG/DL (ref 6–23)
CALCIUM SERPL-MCNC: 9.4 MG/DL (ref 8.6–10.3)
CHLORIDE SERPL-SCNC: 100 MMOL/L (ref 98–107)
CO2 SERPL-SCNC: 23 MMOL/L (ref 21–32)
CREAT SERPL-MCNC: 1.4 MG/DL (ref 0.5–1.3)
EGFRCR SERPLBLD CKD-EPI 2021: 57 ML/MIN/1.73M*2
ERYTHROCYTE [DISTWIDTH] IN BLOOD BY AUTOMATED COUNT: 13.7 % (ref 11.5–14.5)
GLUCOSE BLD MANUAL STRIP-MCNC: 155 MG/DL (ref 74–99)
GLUCOSE BLD MANUAL STRIP-MCNC: 197 MG/DL (ref 74–99)
GLUCOSE BLD MANUAL STRIP-MCNC: 223 MG/DL (ref 74–99)
GLUCOSE BLD MANUAL STRIP-MCNC: 240 MG/DL (ref 74–99)
GLUCOSE SERPL-MCNC: 208 MG/DL (ref 74–99)
HCT VFR BLD AUTO: 44.1 % (ref 41–52)
HGB BLD-MCNC: 14.7 G/DL (ref 13.5–17.5)
HOLD SPECIMEN: NORMAL
MCH RBC QN AUTO: 28.1 PG (ref 26–34)
MCHC RBC AUTO-ENTMCNC: 33.3 G/DL (ref 32–36)
MCV RBC AUTO: 84 FL (ref 80–100)
NRBC BLD-RTO: 0 /100 WBCS (ref 0–0)
PLATELET # BLD AUTO: 301 X10*3/UL (ref 150–450)
POTASSIUM SERPL-SCNC: 5.1 MMOL/L (ref 3.5–5.3)
RBC # BLD AUTO: 5.24 X10*6/UL (ref 4.5–5.9)
SODIUM SERPL-SCNC: 135 MMOL/L (ref 136–145)
WBC # BLD AUTO: 8.1 X10*3/UL (ref 4.4–11.3)

## 2024-01-30 PROCEDURE — 36415 COLL VENOUS BLD VENIPUNCTURE: CPT | Performed by: STUDENT IN AN ORGANIZED HEALTH CARE EDUCATION/TRAINING PROGRAM

## 2024-01-30 PROCEDURE — 87081 CULTURE SCREEN ONLY: CPT | Mod: ELYLAB | Performed by: STUDENT IN AN ORGANIZED HEALTH CARE EDUCATION/TRAINING PROGRAM

## 2024-01-30 PROCEDURE — 82947 ASSAY GLUCOSE BLOOD QUANT: CPT

## 2024-01-30 PROCEDURE — 97530 THERAPEUTIC ACTIVITIES: CPT | Mod: GP,CQ

## 2024-01-30 PROCEDURE — 1100000001 HC PRIVATE ROOM DAILY

## 2024-01-30 PROCEDURE — 2500000004 HC RX 250 GENERAL PHARMACY W/ HCPCS (ALT 636 FOR OP/ED): Performed by: STUDENT IN AN ORGANIZED HEALTH CARE EDUCATION/TRAINING PROGRAM

## 2024-01-30 PROCEDURE — 80048 BASIC METABOLIC PNL TOTAL CA: CPT | Performed by: STUDENT IN AN ORGANIZED HEALTH CARE EDUCATION/TRAINING PROGRAM

## 2024-01-30 PROCEDURE — 99232 SBSQ HOSP IP/OBS MODERATE 35: CPT | Performed by: STUDENT IN AN ORGANIZED HEALTH CARE EDUCATION/TRAINING PROGRAM

## 2024-01-30 PROCEDURE — 2500000004 HC RX 250 GENERAL PHARMACY W/ HCPCS (ALT 636 FOR OP/ED)

## 2024-01-30 PROCEDURE — 2500000001 HC RX 250 WO HCPCS SELF ADMINISTERED DRUGS (ALT 637 FOR MEDICARE OP)

## 2024-01-30 PROCEDURE — 99232 SBSQ HOSP IP/OBS MODERATE 35: CPT | Performed by: INTERNAL MEDICINE

## 2024-01-30 PROCEDURE — 97535 SELF CARE MNGMENT TRAINING: CPT | Mod: GO,CO

## 2024-01-30 PROCEDURE — 2500000001 HC RX 250 WO HCPCS SELF ADMINISTERED DRUGS (ALT 637 FOR MEDICARE OP): Performed by: NURSE PRACTITIONER

## 2024-01-30 PROCEDURE — 2500000004 HC RX 250 GENERAL PHARMACY W/ HCPCS (ALT 636 FOR OP/ED): Performed by: NURSE PRACTITIONER

## 2024-01-30 PROCEDURE — 85027 COMPLETE CBC AUTOMATED: CPT | Performed by: STUDENT IN AN ORGANIZED HEALTH CARE EDUCATION/TRAINING PROGRAM

## 2024-01-30 RX ORDER — DIPHENHYDRAMINE HCL 25 MG
25 TABLET ORAL EVERY 6 HOURS PRN
Status: DISCONTINUED | OUTPATIENT
Start: 2024-01-30 | End: 2024-02-01 | Stop reason: HOSPADM

## 2024-01-30 RX ORDER — SODIUM CHLORIDE 9 MG/ML
100 INJECTION, SOLUTION INTRAVENOUS CONTINUOUS
Status: DISCONTINUED | OUTPATIENT
Start: 2024-01-30 | End: 2024-02-01 | Stop reason: HOSPADM

## 2024-01-30 RX ADMIN — ENOXAPARIN SODIUM 40 MG: 40 INJECTION SUBCUTANEOUS at 07:54

## 2024-01-30 RX ADMIN — FAMOTIDINE 20 MG: 20 TABLET, FILM COATED ORAL at 07:54

## 2024-01-30 RX ADMIN — DOCUSATE SODIUM 100 MG: 100 CAPSULE, LIQUID FILLED ORAL at 07:54

## 2024-01-30 RX ADMIN — INSULIN LISPRO 1 UNITS: 100 INJECTION, SOLUTION INTRAVENOUS; SUBCUTANEOUS at 07:55

## 2024-01-30 RX ADMIN — DEXTROSE MONOHYDRATE 2 G: 5 INJECTION INTRAVENOUS at 16:44

## 2024-01-30 RX ADMIN — ACETAMINOPHEN 650 MG: 325 TABLET ORAL at 17:08

## 2024-01-30 RX ADMIN — KETOROLAC TROMETHAMINE 15 MG: 30 INJECTION, SOLUTION INTRAMUSCULAR; INTRAVENOUS at 04:36

## 2024-01-30 RX ADMIN — SODIUM CHLORIDE 100 ML/HR: 9 INJECTION, SOLUTION INTRAVENOUS at 10:43

## 2024-01-30 RX ADMIN — OXYCODONE HYDROCHLORIDE 10 MG: 5 TABLET ORAL at 04:36

## 2024-01-30 RX ADMIN — INSULIN LISPRO 2 UNITS: 100 INJECTION, SOLUTION INTRAVENOUS; SUBCUTANEOUS at 11:38

## 2024-01-30 RX ADMIN — FAMOTIDINE 20 MG: 20 TABLET, FILM COATED ORAL at 20:34

## 2024-01-30 RX ADMIN — DOCUSATE SODIUM 100 MG: 100 CAPSULE, LIQUID FILLED ORAL at 20:34

## 2024-01-30 RX ADMIN — ROSUVASTATIN CALCIUM 10 MG: 10 TABLET, FILM COATED ORAL at 20:34

## 2024-01-30 RX ADMIN — INSULIN LISPRO 2 UNITS: 100 INJECTION, SOLUTION INTRAVENOUS; SUBCUTANEOUS at 16:45

## 2024-01-30 ASSESSMENT — COGNITIVE AND FUNCTIONAL STATUS - GENERAL
TOILETING: A LITTLE
DAILY ACTIVITIY SCORE: 22
STANDING UP FROM CHAIR USING ARMS: A LITTLE
HELP NEEDED FOR BATHING: A LITTLE
MOVING TO AND FROM BED TO CHAIR: A LITTLE
DRESSING REGULAR UPPER BODY CLOTHING: A LITTLE
MOBILITY SCORE: 15
MOVING TO AND FROM BED TO CHAIR: A LITTLE
MOBILITY SCORE: 19
STANDING UP FROM CHAIR USING ARMS: A LITTLE
PERSONAL GROOMING: A LITTLE
MOVING TO AND FROM BED TO CHAIR: A LITTLE
CLIMB 3 TO 5 STEPS WITH RAILING: A LOT
TURNING FROM BACK TO SIDE WHILE IN FLAT BAD: A LOT
DRESSING REGULAR LOWER BODY CLOTHING: A LOT
DRESSING REGULAR LOWER BODY CLOTHING: A LITTLE
CLIMB 3 TO 5 STEPS WITH RAILING: A LOT
WALKING IN HOSPITAL ROOM: A LITTLE
WALKING IN HOSPITAL ROOM: A LITTLE
MOBILITY SCORE: 19
DRESSING REGULAR LOWER BODY CLOTHING: A LITTLE
MOVING FROM LYING ON BACK TO SITTING ON SIDE OF FLAT BED WITH BEDRAILS: A LITTLE
TOILETING: A LOT
CLIMB 3 TO 5 STEPS WITH RAILING: TOTAL
STANDING UP FROM CHAIR USING ARMS: A LITTLE
DAILY ACTIVITIY SCORE: 17
WALKING IN HOSPITAL ROOM: A LITTLE
DAILY ACTIVITIY SCORE: 23

## 2024-01-30 ASSESSMENT — PAIN - FUNCTIONAL ASSESSMENT
PAIN_FUNCTIONAL_ASSESSMENT: 0-10

## 2024-01-30 ASSESSMENT — PAIN SCALES - GENERAL
PAINLEVEL_OUTOF10: 8
PAINLEVEL_OUTOF10: 4
PAINLEVEL_OUTOF10: 4
PAINLEVEL_OUTOF10: 6
PAINLEVEL_OUTOF10: 4
PAINLEVEL_OUTOF10: 7

## 2024-01-30 ASSESSMENT — ACTIVITIES OF DAILY LIVING (ADL): HOME_MANAGEMENT_TIME_ENTRY: 18

## 2024-01-30 ASSESSMENT — PAIN DESCRIPTION - LOCATION: LOCATION: HEAD

## 2024-01-30 NOTE — CARE PLAN
Problem: Skin  Goal: Decreased wound size/increased tissue granulation at next dressing change  Outcome: Progressing  Goal: Participates in plan/prevention/treatment measures  Outcome: Progressing  Goal: Prevent/manage excess moisture  Outcome: Progressing  Goal: Prevent/minimize sheer/friction injuries  Outcome: Progressing  Goal: Promote/optimize nutrition  Outcome: Progressing  Goal: Promote skin healing  Outcome: Progressing   The patient's goals for the shift include      The clinical goals for the shift include pain control, decreased redness swelling of left knee, remain afebrile

## 2024-01-30 NOTE — PROGRESS NOTES
Occupational Therapy    OT Treatment    Patient Name: Melchor Bergeron  MRN: [id number]  Today's Date: 1/30/2024  Time Calculation  Start Time: 0911  Stop Time: 0929  Time Calculation (min): 18 min         Assessment:  End of Session Communication: Bedside nurse  End of Session Patient Position: Up in chair, Alarm on         Subjective   Previous Visit Info:  OT Last Visit  OT Received On: 01/30/24  General:  General  Family/Caregiver Present: Yes  Caregiver Feedback: pt sister present throughout  Prior to Session Communication: Bedside nurse  Patient Position Received: Up in chair, Alarm on  General Comment: pt agreeable to OT tx, pt stating he is feeling very dizzy this date. pt required frequent seated rest breaks  Precautions:  LE Weight Bearing Status:  (LLE WBAT)  Medical Precautions: Fall precautions  Precautions Comment: OK for ROM, KI as needed  Vital Signs:     Pain:  Pain Assessment  Pain Assessment: 0-10  Pain Score:  (2/10 pain rating in L knee)    Objective    Cognition:  Cognition  Overall Cognitive Status: Within Functional Limits  Coordination:     Activities of Daily Living: LE Bathing  LE Bathing Comments: pt seated in recliner engaging in LB bathing tasks with SBA. pt required CGA while standing engaging in posterior shira hygiene. pt required x 1 seated rest break during d/t fatigue    LE Dressing  LE Dressing:  (pt required CGA for steadying while performing clothing mgmt up/down B hips. pt educated on alternating hand placement for steadying. pt seated for threading pants over BLE.)  Functional Standing Tolerance:  Functional Standing Tolerance Comments: pt demos F - supported standing balance  Bed Mobility/Transfers: Transfers  Transfer:  (pt required CGA for sit < > stand transfers. limited transfers performed this date d/t increased dizziness with transitional movement)      Outcome Measures:[facility] Daily Activity  Putting on and taking off regular lower body clothing: A lot  Bathing (including  washing, rinsing, drying): A little  Putting on and taking off regular upper body clothing: A little  Toileting, which includes using toilet, bedpan or urinal: A lot  Taking care of personal grooming such as brushing teeth: A little  Eating Meals: None  Daily Activity - Total Score: 17        Education Documentation  ADL Training, taught by MANUEL Marroquin at 1/30/2024 10:08 AM.  Learner: Patient  Readiness: Acceptance  Method: Explanation  Response: Needs Reinforcement  Comment: pt educated on OT POC and EC techs    ADL Training, taught by MANUEL Marroquin at 1/29/2024  2:08 PM.  Learner: Patient  Readiness: Acceptance  Method: Explanation  Response: Needs Reinforcement  Comment: pt educated on OT POC and BUE strengthening exercises    Education Comments  No comments found.        OP EDUCATION:       Goals:  Encounter Problems       Encounter Problems (Active)       OT Goals       OT Goal 1 (Progressing)       Start:  01/21/24    Expected End:  02/01/24       Patient will complete UB bathing and dressing with Mod I/Independent with AE as needed         OT Goal 2 (Progressing)       Start:  01/21/24    Expected End:  02/01/24       Patient will complete LB bathing and dressing with CGA/SBA and with AE as needed.         OT Goal 3 (Progressing)       Start:  01/21/24    Expected End:  02/01/24       Patient will complete all transfers for ADLs and functional activities with CGA/Min A with FWW as needed.

## 2024-01-30 NOTE — PROGRESS NOTES
Physical Therapy    Physical Therapy Treatment    Patient Name: Melchor Bergeron  MRN: [id number]  Today's Date: 1/30/2024  Time Calculation  Start Time: 0818  Stop Time: 0847  Time Calculation (min): 29 min       Assessment/Plan   End of Session Communication: Bedside nurse (Notified of BP and paitent's c/o headache and dizziness.)  End of Session Patient Position:  (Reclined in chair after treatment.  Chair alarm on.  Call light and tray in reach.)    PT Plan  Inpatient/Swing Bed or Outpatient: Inpatient  Treatment/Interventions: Bed mobility, Transfer training, Gait training, Therapeutic exercise, Home exercise program  PT Plan: Skilled PT  PT Frequency: 5 times per week  PT Discharge Recommendations: Moderate intensity level of continued care  Equipment Recommended upon Discharge:  (pt states that he has a rollator that he plans to use when he returns to home) PT Recommended Transfer Status: Assist x1, Assistive device    General Visit Information:   PT  Visit  PT Received On: 01/30/24    General  Prior to Session Communication: Bedside nurse  Patient Position Received:  (Patient presents reclined in chair, agreeable to PT.)    General Comment:  (admitted with left knee septic arthritis)    General Observations:   General Observation:  (IV, alarm)    Subjective Patient voicing frustration about his length of stay.  He feels that he's getting weaker instead of better.  He now c/o weakness in his neck and states he's having a hard time lifting up his head.      Precautions:  Precautions  LE Weight Bearing Status:  (LLE WBAT)  Medical Precautions: Fall precautions  Precautions Comment: OK for ROM, KI as needed    Vital Signs:  Vital Signs  BP: 89/54  BP Location: Right arm  Patient Position: Sitting      Pain:  Pain Assessment  Pain Assessment: 0-10  Pain Score: 4  Pain Location: Knee  Pain Orientation: Left    Cognition:  Cognition  Overall Cognitive Status: Within Functional Limits (Anxious)  Orientation Level:  Oriented X4      Balance:   Static Standing Balance  Static Standing-Level of Assistance:  (Fair/Fair- dynamic stand with ww support)    Dynamic Standing Balance  Dynamic Standing-Comments:  (Fair- dynamic stand with ww support)      Activity Tolerance:  Activity Tolerance  Endurance:  (Fatigues quickly with activity.  Limited tolerance)      Therapeutic Exercise  Therapeutic Exercise Performed:  (LE ther-ex: AP, QS, GS, HS, LAQ, seated hip marching x10)     Balance/Neuromuscular Re-Education  Balance/Neuromuscular Re-Education Activity Performed:  (Stood at sink ~1 minute for ADL's.  Patient able to stand for ~5-10 seconds at a time without UE support.  Patient also tends to lean on ww for support.  Rapid fatigue.)    Ambulation/Gait Training  Ambulation/Gait Training Performed:  (Patient amb 15'x2 with ww and min x1.  Slow nadiya, antalgic gait.  Limits WB through LLE.  Decreased left knee flexion during swing phase of gait. Tendency to keep head down and reports difficulty holding it up. He reported sudden headache in posterior aspect of neck/head during gait.  No LOB.  Once sitting he reported feeling dizzy/lightheaded. BP checked.  89/54 mmHg.  RN notified.     Transfers  Transfer:  (sit-->stand: min x1 Two stands performed (recliner chair; elevated toilet).  Patient demos good hand placement.  Assist needed to lift buttocks from seat.     stand-->sit: min x1 to assist with controlling descent)        Outcome Measures:  [facility] Basic Mobility  Turning from your back to your side while in a flat bed without using bedrails: A little  Moving from lying on your back to sitting on the side of a flat bed without using bedrails: A lot  Moving to and from bed to chair (including a wheelchair): A little  Standing up from a chair using your arms (e.g. wheelchair or bedside chair): A little  To walk in hospital room: A little  Climbing 3-5 steps with railing: Total  Basic Mobility - Total Score: 15    Education  Documentation  Precautions, taught by Catarina Carrillo PTA at 1/30/2024  9:04 AM.  Learner: Patient  Readiness: Eager  Method: Explanation, Demonstration  Response: Demonstrated Understanding, Verbalizes Understanding    Mobility Training, taught by Catarina Carrillo PTA at 1/30/2024  9:04 AM.  Learner: Patient  Readiness: Eager  Method: Explanation, Demonstration  Response: Demonstrated Understanding, Verbalizes Understanding      Education Comments  Individual(s) Educated: Patient  Education Provided:  (LLE tends to externally rotate.  Patient educted to try to keep in neutral position.  Disscussed placeing rolled up blankets on the side of the leg to assist in positioning, but instructed NOT to place anything under knee)  Patient Response to Education: Patient/Caregiver Verbalized Understanding of Information    Encounter Problems       Encounter Problems (Active)       PT Problem       Pt will demonstrate mod I with bed mobility to edge of bed.   (Progressing)       Start:  01/21/24    Expected End:  02/01/24            Pt will demonstrate mod I  with sit to stand/chair transfers with FWW.   (Progressing)       Start:  01/21/24    Expected End:  02/01/24            Pt will ambulate 50 feet with FWW mod I NWB L LE .   (Not met)       Start:  01/21/24    Expected End:  02/04/24    Resolved:  01/25/24    Updated to: Pt will ambulate 100 feet with FWW mod I WBAT L LE .    Update reason: wt bearing change          Pt to demo improved BLE strength by being able to complete supine/seated thera ex 2x20 BLEs with 4 or less rest breaks .   (Progressing)       Start:  01/21/24    Expected End:  02/01/24            Pt will ambulate 100 feet with FWW mod I WBAT L LE . (Progressing)       Start:  01/25/24    Expected End:  02/01/24

## 2024-01-30 NOTE — PROGRESS NOTES
01/30/24 1434   Discharge Planning   Home or Post Acute Services Post acute facilities (Rehab/SNF/etc)   Type of Post Acute Facility Services Skilled nursing;Rehab   Patient expects to be discharged to: [facility]   Does the patient need discharge transport arranged? Yes   RoundTrip coordination needed? Yes     SNF auth for [facility] received. Per Attending MD pt will have PICC place tomorrow for 4 weeks IV Rocephin. ADOD tomorrow d/t pt had hypotension and high blood sugar today that are being medically managed. DORIE Canas updated.

## 2024-01-30 NOTE — CONSULTS
Nutrition Diet Education      RD consulted for LOS. Pt declined full nutritional assessment at this time, is agreeable to education. Currently on 60g carb controlled diet. All questions answered at this time. Outpatient RD contact information provided.    Education Documentation  Nutrition Care Manual, taught by Virginia Paz RDN, LD at 1/30/2024  3:19 PM.  Learner: Other, Patient  Readiness: Acceptance  Method: Explanation, Handout  Response: Verbalizes Understanding  Comment: Pt and visitor at bedside educated on 60g carb controlled diet. Given carb counting for diabetes handout, instructed pt to continue 60g carb controlled diet, with 3 meals per day. With plans for 75g when BG better controlled.          Follow Up:     Time Spent (min): 30 minutes  Last Date of Nutrition Visit: 01/30/24  Nutrition Follow-Up Needed?: Dietitian to reassess per policy

## 2024-01-30 NOTE — CARE PLAN
Problem: Fall/Injury  Goal: Verbalize understanding of risk factor reduction measures to prevent injury from fall in the home  Outcome: Progressing  Goal: Pace activities to prevent fatigue by end of the shift  Outcome: Progressing     Problem: Pain  Goal: Takes deep breaths with improved pain control throughout the shift  Outcome: Progressing  Goal: Turns in bed with improved pain control throughout the shift  Outcome: Progressing  Goal: Walks with improved pain control throughout the shift  Outcome: Progressing  Goal: Performs ADL's with improved pain control throughout shift  Outcome: Progressing  Goal: Participates in PT with improved pain control throughout the shift  Outcome: Progressing  Goal: Free from opioid side effects throughout the shift  Outcome: Progressing  Goal: Free from acute confusion related to pain meds throughout the shift  Outcome: Progressing     Problem: Safety - Adult  Goal: Free from fall injury  Outcome: Progressing     Problem: Discharge Planning  Goal: Discharge to home or other facility with appropriate resources  Outcome: Progressing     Problem: Chronic Conditions and Co-morbidities  Goal: Patient's chronic conditions and co-morbidity symptoms are monitored and maintained or improved  Outcome: Progressing     Problem: Diabetes  Goal: Achieve decreasing blood glucose levels by end of shift  Outcome: Progressing  Goal: Increase stability of blood glucose readings by end of shift  Outcome: Progressing  Goal: Decrease in ketones present in urine by end of shift  Outcome: Progressing  Goal: Maintain electrolyte levels within acceptable range throughout shift  Outcome: Progressing  Goal: Maintain glucose levels >70mg/dl to <250mg/dl throughout shift  Outcome: Progressing  Goal: No changes in neurological exam by end of shift  Outcome: Progressing  Goal: Learn about and adhere to nutrition recommendations by end of shift  Outcome: Progressing  Goal: Vital signs within normal range for  age by end of shift  Outcome: Progressing  Goal: Increase self care and/or family involovement by end of shift  Outcome: Progressing  Goal: Receive DSME education by end of shift  Outcome: Progressing   The patient's goals for the shift include      The clinical goals for the shift include pain control, decreased redness swelling of left knee, remain afebrile

## 2024-01-30 NOTE — DOCUMENTATION CLARIFICATION NOTE
PATIENT:               AMARJIT COLLINS  ACCT #:                  [id number]  MRN:                       [id number]  :                       1960  ADMIT DATE:       2024 5:29 AM  DISCH DATE:  RESPONDING PROVIDER #:        [id number]          PROVIDER RESPONSE TEXT:    Sepsis with acute organ dysfunction of RUHT ruled in for this admission    CDI QUERY TEXT:    [facility]_Diagnosis Ruled Out In        Instruction:    Based on your assessment of the patient and the clinical information, please provide the requested documentation by clicking on the appropriate radio button and enter any additional information if prompted.    Question: Please further clarify the diagnosis of Sepsis as    When answering this query, please exercise your independent professional judgment. The fact that a question is being asked, does not imply that any particular answer is desired or expected.    The patient's clinical indicators include:  , Clinical Information: 63 yom with septic arthritis following injection, meeting SIRS criteria on  and     Documented Diagnosis:  and  note by Dr Chavarria: Sepsis due to other specified Staphylococcus    Clinical Indicators:  -Vital Signs: SIRS documented, meeting SIRS with temperature and pulse on  and   -WBC: WNL  -Microbiology Results: Right knee culture from : Streptococcus mitis oralis group  -Band Neutrophil Count/percent Band Neutrophil: n/a  -Lactic acid: 0.8  -BUN/Creat: -: 22/0.94, 23/1.15, 27/1.28, 29/1.18, 24/1.19  -Blood cultures: Negative  -Bilirubin: n/a  -MAP:   -[place] Coma Scale: n/a  -PAO2/FIO2: n/a  -Procalcitonin: 1.06  -Platelets: WNL  -Other clinical indicators: n/a    Treatment: ID consult, labs, cultures, Rocephin, IVF bolus, arthroscopy knee infection lavage and drainage    Risk Factors: 63 yom with infection  Options provided:  -- Sepsis ruled out after workup  -- Sepsis with acute organ dysfunction of RUTH ruled in for this  admission  -- Other - I will add my own diagnosis  -- Refer to Clinical Documentation Reviewer    Query created by: Ida Ureña on 1/30/2024 8:36 AM      Electronically signed by:  DAIANA CURRAN MD 1/30/2024 9:07 AM

## 2024-01-30 NOTE — PROGRESS NOTES
History: Melchor is postop day 6 from a second I&D for a left knee infection.  His pain is stable.  No new complaints.    Exam: Dressing is clear.  Normal swelling in the knee.  No redness or streaking.  No numbness or tingling.    Radiographs: None    Impression: Stable left knee I&D x 2 postop day 4    Plan: He continues to slowly improve in the knee looks stable.  He is being seen by the infectious disease staff for antibiotic coverage with likely PICC line placement.  He can weight-bear as tolerated with physical therapy and will continue to ice aggressively.

## 2024-01-30 NOTE — PROGRESS NOTES
Infectious Disease Progress Note       1/30/2024    Patient is a followup regarding  Left knee pain and swelling, found to have septic arthritis left knee post intra-articular steroid injection.  Cultures with strep mitis  Patient was having issues with fever and tachycardia now resolved.  He is a diabetic  Case discussed with primary yesterday    Blood cultures remain negative  Patient has been on vancomycin and ceftriaxone  Vancomycin was added on empirically due to persistent fevers prior to culture results    Patient awake alert oriented x 3  No acute distress  Has been working on his incentive spirometer      Ice packs on left knee  Decreasing swelling  Overall feeling better  Lab Results   Component Value Date    WBC 8.1 01/30/2024    HGB 14.7 01/30/2024    HCT 44.1 01/30/2024    MCV 84 01/30/2024     01/30/2024     Lab Results   Component Value Date    GLUCOSE 208 (H) 01/30/2024    CALCIUM 9.4 01/30/2024     (L) 01/30/2024    K 5.1 01/30/2024    CO2 23 01/30/2024     01/30/2024    BUN 30 (H) 01/30/2024    CREATININE 1.40 (H) 01/30/2024     Mild elevation in creatinine noted  WBC trends are being monitored. Antibiotic doses are being adjusted per most recent renal labs.     Vitals:    01/30/24 1040   BP: 75/55   Pulse:    Resp:    Temp:    SpO2:            Patient Active Problem List   Diagnosis    Hypertension    Hyperlipidemia    Chronic pain of left knee    Pain in posterior left lower extremity    Primary osteoarthritis of left knee    Type 2 diabetes mellitus with microalbuminuria, without long-term current use of insulin (CMS/[facility])    Gastroesophageal reflux disease    Arthritis due to other bacteria, left knee (CMS/[facility])    Arthritis, septic (CMS/[facility])           ASSESSMENT:  Left knee pain and swelling  Left knee septic arthritis post intra-articular joint injection  Strep mitis infection  Diabetes mellitus type 2      PLAN:  Vancomycin was discontinued.  Monitor trend of creatinine  clearance  Blood pressure still very low but patient does not feel dizzy  Patient will be discharged on ceftriaxone 2 g IV daily x 4 weeks  Weekly CBC BMP CRP  ID follow-up in 3 to 4 weeks  Optimal glycemic control  Case discussed with primary  Imaging and labs were reviewed per medical records and any ID pertinent labs were also addressed        Tomeka Christianson DO

## 2024-01-30 NOTE — PROGRESS NOTES
Melchor Bergeron is a 63 y.o. male on day 10 of admission presenting with Arthritis due to other bacteria, left knee (CMS/HCC).      Subjective   Pt is stable, in NAD, out of bed to chair, no complaint, L knee swelling is slowly improving   Was hypotensive, renal function worse today, started on IVF hydration  Pending ID recs for IV Abx and possible need for any line placement for longterm IV Abx on discharge      Objective     Last Recorded Vitals  BP 75/55   Pulse 95   Temp 36.8 °C (98.2 °F)   Resp 18   Wt 75.1 kg (165 lb 9.1 oz)   SpO2 96%   Intake/Output last 3 Shifts:    Intake/Output Summary (Last 24 hours) at 1/30/2024 1230  Last data filed at 1/30/2024 0500  Gross per 24 hour   Intake 50 ml   Output 1300 ml   Net -1250 ml       Admission Weight  Weight: 75.1 kg (165 lb 9.1 oz) (01/20/24 0635)    Daily Weight  01/20/24 : 75.1 kg (165 lb 9.1 oz)    Image Results  CT guided fine percutaneous aspiration  Narrative: Interpreted By:  Dhaval Vazquez,   STUDY:  CT GUIDED FINE PERCUTANEOUS ASPIRATION;  1/26/2024 11:07 am      INDICATION:  Signs/Symptoms:fluid aspiration for analysis and culture.      COMPARISON:  None.      ACCESSION NUMBER(S):  [id number]      ORDERING CLINICIAN:  RALEIGH SALINAS      TECHNIQUE:  The procedure is explained to the patient including the potential  risks and benefits. Understanding the risks, the patient agrees to  have the procedure performed.      FINDINGS:  The previous studies are reviewed including an MRI of the left chest  wall and shoulder performed 01/23/2024. This study demonstrates high  signal in the 1st left costochondral junction with surrounding soft  tissue edema extending along the chest wall and into the pectoralis  muscle.      Preliminary images of the left 1st costochondral junction  demonstrates some widening of the junction. Surrounding soft tissue  edema may have decreased from the previous MRI.      The paramidline left upper thorax is prepped and draped  in the usual  sterile fashion. 1% lidocaine is used to anesthetize the skin and  underlying soft tissues from the skin surface to the joint space.  Under CT guidance, a 22 gauge spinal needles inserted into the 1st  costochondral junction. Multiple aspirations at this site produce no  fluid. 0.5 cc of sterile saline is slowly infused into the joint and  additional aspiration is performed which yielded no fluid.      The study is concluded at this point. The needle was withdrawn  hemostasis is achieved. The patient is returned to the floor in  stable condition.      Impression: Multiple CT aspirations of the left costochondral junction, at the  site of MRI abnormality, yields no fluid. The studies are conveyed to  the ordering provider at the time of the study through [employer] secure  chat. Confirmation of receipt of the information is immediately  received.      Signed by: Dhaval Vazquez 1/28/2024 11:17 AM  Dictation workstation:   [id number]      Physical Exam    General: Well-developed male, in no acute distress  HEENT: AT, NC, no JVD, no lymphadenopathy, neck supple  Lungs: Clear, no wheezing, no crackles  Cardiac: Normal S1-S2, no murmur, no gallop  Abdomen: Soft, nontender, no distention, positive bowel sound  Extremities: Left knee swelling, LLE diminished ROM, pulses intact  Neurological: Alert awake oriented x3, sensation intact, clear speech  Skin: rash on abdomen    Assessment/Plan   Principal Problem:    Arthritis due to other bacteria, left knee (CMS/HCC)  Active Problems:    Arthritis, septic (CMS/HCC)    #Septic arthritis of the left knee status post intra-articular steroid injection  #Arthritis of left sternoclavicular joint  #Streptococcus mitis infection  #Fever, tachycardia resolved   #RUTH likely prerenal  #Poorly controlled type 2 diabetes, hemoglobin A1c 15 on 12/23  #HTN, HLD    Status post arthrocentesis growing Streptococcus  Pt was started back on Rocephin yesterday, doing well  Pending ID  recs for final Abx management   IVF hydration  Monitor renal function, avoid nephrotoxic agent  ISS, hypoglycemia protocol, POCT glucose, DM diet  Continue with home meds for other comorbidities  DVT prophylaxis: Lovenox subcu  Benadryl for allergic reaction  Disposition: SNF once hemodynamically stable      Kaley Barry MD

## 2024-01-30 NOTE — DISCHARGE INSTRUCTIONS
Weight-bear as tolerated to left lower extremity  May perform range of motion to left knee  Follow-up with surgeon in 2 weeks

## 2024-01-30 NOTE — PROGRESS NOTES
Social Work Note Pt accepted at [facility].  Requested that precert be applied for.  Later rcvd msg from [facility] that precert has been obtained.  ELSIE Gomez

## 2024-01-30 NOTE — NURSING NOTE
Order was received from Dr. Barry for PICC line placement.  She states that patient will go on Rocephin for 4 weeks and ID wants PICC.  Patient is being DC tomorrow per Dr. Barry and is aware PICC line will be placed tomorrow.

## 2024-01-31 ENCOUNTER — APPOINTMENT (OUTPATIENT)
Dept: RADIOLOGY | Facility: HOSPITAL | Age: 64
DRG: 856 | End: 2024-01-31
Payer: COMMERCIAL

## 2024-01-31 VITALS
TEMPERATURE: 98.2 F | HEIGHT: 69 IN | SYSTOLIC BLOOD PRESSURE: 101 MMHG | HEART RATE: 91 BPM | WEIGHT: 165.57 LBS | RESPIRATION RATE: 18 BRPM | BODY MASS INDEX: 24.52 KG/M2 | DIASTOLIC BLOOD PRESSURE: 66 MMHG | OXYGEN SATURATION: 96 %

## 2024-01-31 LAB
ANION GAP SERPL CALC-SCNC: 14 MMOL/L (ref 10–20)
BUN SERPL-MCNC: 30 MG/DL (ref 6–23)
CALCIUM SERPL-MCNC: 8.7 MG/DL (ref 8.6–10.3)
CHLORIDE SERPL-SCNC: 105 MMOL/L (ref 98–107)
CO2 SERPL-SCNC: 21 MMOL/L (ref 21–32)
CREAT SERPL-MCNC: 1.26 MG/DL (ref 0.5–1.3)
EGFRCR SERPLBLD CKD-EPI 2021: 64 ML/MIN/1.73M*2
ERYTHROCYTE [DISTWIDTH] IN BLOOD BY AUTOMATED COUNT: 13.7 % (ref 11.5–14.5)
GLUCOSE BLD MANUAL STRIP-MCNC: 144 MG/DL (ref 74–99)
GLUCOSE BLD MANUAL STRIP-MCNC: 155 MG/DL (ref 74–99)
GLUCOSE BLD MANUAL STRIP-MCNC: 159 MG/DL (ref 74–99)
GLUCOSE SERPL-MCNC: 155 MG/DL (ref 74–99)
HCT VFR BLD AUTO: 41.2 % (ref 41–52)
HGB BLD-MCNC: 13.7 G/DL (ref 13.5–17.5)
HOLD SPECIMEN: NORMAL
MCH RBC QN AUTO: 27.9 PG (ref 26–34)
MCHC RBC AUTO-ENTMCNC: 33.3 G/DL (ref 32–36)
MCV RBC AUTO: 84 FL (ref 80–100)
NRBC BLD-RTO: 0 /100 WBCS (ref 0–0)
PLATELET # BLD AUTO: 307 X10*3/UL (ref 150–450)
POTASSIUM SERPL-SCNC: 4.6 MMOL/L (ref 3.5–5.3)
RBC # BLD AUTO: 4.91 X10*6/UL (ref 4.5–5.9)
SODIUM SERPL-SCNC: 135 MMOL/L (ref 136–145)
WBC # BLD AUTO: 9 X10*3/UL (ref 4.4–11.3)

## 2024-01-31 PROCEDURE — 2720000007 HC OR 272 NO HCPCS

## 2024-01-31 PROCEDURE — C1751 CATH, INF, PER/CENT/MIDLINE: HCPCS

## 2024-01-31 PROCEDURE — 36415 COLL VENOUS BLD VENIPUNCTURE: CPT | Performed by: STUDENT IN AN ORGANIZED HEALTH CARE EDUCATION/TRAINING PROGRAM

## 2024-01-31 PROCEDURE — 80048 BASIC METABOLIC PNL TOTAL CA: CPT | Performed by: STUDENT IN AN ORGANIZED HEALTH CARE EDUCATION/TRAINING PROGRAM

## 2024-01-31 PROCEDURE — 82947 ASSAY GLUCOSE BLOOD QUANT: CPT

## 2024-01-31 PROCEDURE — 36569 INSJ PICC 5 YR+ W/O IMAGING: CPT

## 2024-01-31 PROCEDURE — 2500000004 HC RX 250 GENERAL PHARMACY W/ HCPCS (ALT 636 FOR OP/ED): Performed by: STUDENT IN AN ORGANIZED HEALTH CARE EDUCATION/TRAINING PROGRAM

## 2024-01-31 PROCEDURE — 2500000001 HC RX 250 WO HCPCS SELF ADMINISTERED DRUGS (ALT 637 FOR MEDICARE OP): Performed by: NURSE PRACTITIONER

## 2024-01-31 PROCEDURE — 85027 COMPLETE CBC AUTOMATED: CPT | Performed by: STUDENT IN AN ORGANIZED HEALTH CARE EDUCATION/TRAINING PROGRAM

## 2024-01-31 PROCEDURE — 99239 HOSP IP/OBS DSCHRG MGMT >30: CPT | Performed by: STUDENT IN AN ORGANIZED HEALTH CARE EDUCATION/TRAINING PROGRAM

## 2024-01-31 PROCEDURE — 2500000004 HC RX 250 GENERAL PHARMACY W/ HCPCS (ALT 636 FOR OP/ED)

## 2024-01-31 RX ORDER — CYCLOBENZAPRINE HCL 10 MG
10 TABLET ORAL 3 TIMES DAILY PRN
Qty: 21 TABLET | Refills: 0
Start: 2024-01-31 | End: 2024-05-01 | Stop reason: SDUPTHER

## 2024-01-31 RX ORDER — OXYCODONE HYDROCHLORIDE 5 MG/1
5 TABLET ORAL EVERY 6 HOURS PRN
Qty: 28 TABLET | Refills: 0 | Status: SHIPPED | OUTPATIENT
Start: 2024-01-31 | End: 2024-02-07

## 2024-01-31 RX ORDER — DOCUSATE SODIUM 100 MG/1
100 CAPSULE, LIQUID FILLED ORAL 2 TIMES DAILY
Qty: 20 CAPSULE | Refills: 0
Start: 2024-01-31 | End: 2024-02-10

## 2024-01-31 RX ORDER — ENOXAPARIN SODIUM 100 MG/ML
40 INJECTION SUBCUTANEOUS DAILY
Qty: 21 EACH | Refills: 0
Start: 2024-01-31 | End: 2024-02-21

## 2024-01-31 RX ADMIN — ACETAMINOPHEN 650 MG: 325 TABLET ORAL at 14:15

## 2024-01-31 RX ADMIN — ENOXAPARIN SODIUM 40 MG: 40 INJECTION SUBCUTANEOUS at 11:42

## 2024-01-31 RX ADMIN — DEXTROSE MONOHYDRATE 2 G: 5 INJECTION INTRAVENOUS at 18:09

## 2024-01-31 RX ADMIN — INSULIN LISPRO 1 UNITS: 100 INJECTION, SOLUTION INTRAVENOUS; SUBCUTANEOUS at 11:42

## 2024-01-31 RX ADMIN — FAMOTIDINE 20 MG: 20 TABLET, FILM COATED ORAL at 08:08

## 2024-01-31 RX ADMIN — ACETAMINOPHEN 650 MG: 325 TABLET ORAL at 04:24

## 2024-01-31 RX ADMIN — INSULIN LISPRO 1 UNITS: 100 INJECTION, SOLUTION INTRAVENOUS; SUBCUTANEOUS at 08:08

## 2024-01-31 ASSESSMENT — PAIN DESCRIPTION - LOCATION: LOCATION: HEAD

## 2024-01-31 ASSESSMENT — PAIN SCALES - GENERAL: PAINLEVEL_OUTOF10: 5 - MODERATE PAIN

## 2024-01-31 ASSESSMENT — PAIN - FUNCTIONAL ASSESSMENT: PAIN_FUNCTIONAL_ASSESSMENT: 0-10

## 2024-01-31 NOTE — PROGRESS NOTES
Per physician order, pt is to discharge to SNF today.  Transportation via ambulette requested to be arranged by CNC and CNC to submit 7000.  Number for report provided to RN and facility advised of discharge.  Per [facility], she later contacted [facility] and changed transport to ambulance.  ELSIE Gomez

## 2024-01-31 NOTE — PROGRESS NOTES
DAILY PROGRESS NOTE      POD: 7 second arthroscopic I&D for left knee infection.    Patient doing  stable. Reports some improvement in pain to left knee with movement, weight bearing and flexion and extension.  Visit Vitals  BP 95/54   Pulse 99   Temp 36.2 °C (97.2 °F) (Temporal)   Resp 18      Temp (24hrs), Av.4 °C (97.5 °F), Min:36.2 °C (97.2 °F), Max:36.6 °C (97.9 °F)       Pain Control fair  No chest pain or shortness of breath.  No calf pain    Exam:   Extremity shows neuro vascular status intact. Flexion and extension intact on extremity.  Calves soft and non-tender without evidence of DVT.  There is no edema, erythema or warmth appreciated about the left knee.   Endorses pain to left knee with palpation.         Labs reviewed:  Recent Results (from the past 24 hour(s))   POCT GLUCOSE    Collection Time: 24 11:21 AM   Result Value Ref Range    POCT Glucose 223 (H) 74 - 99 mg/dL   POCT GLUCOSE    Collection Time: 24  4:02 PM   Result Value Ref Range    POCT Glucose 240 (H) 74 - 99 mg/dL   POCT GLUCOSE    Collection Time: 24  8:36 PM   Result Value Ref Range    POCT Glucose 155 (H) 74 - 99 mg/dL   CBC    Collection Time: 24  6:40 AM   Result Value Ref Range    WBC 9.0 4.4 - 11.3 x10*3/uL    nRBC 0.0 0.0 - 0.0 /100 WBCs    RBC 4.91 4.50 - 5.90 x10*6/uL    Hemoglobin 13.7 13.5 - 17.5 g/dL    Hematocrit 41.2 41.0 - 52.0 %    MCV 84 80 - 100 fL    MCH 27.9 26.0 - 34.0 pg    MCHC 33.3 32.0 - 36.0 g/dL    RDW 13.7 11.5 - 14.5 %    Platelets 307 150 - 450 x10*3/uL   Basic Metabolic Panel    Collection Time: 24  6:40 AM   Result Value Ref Range    Glucose 155 (H) 74 - 99 mg/dL    Sodium 135 (L) 136 - 145 mmol/L    Potassium 4.6 3.5 - 5.3 mmol/L    Chloride 105 98 - 107 mmol/L    Bicarbonate 21 21 - 32 mmol/L    Anion Gap 14 10 - 20 mmol/L    Urea Nitrogen 30 (H) 6 - 23 mg/dL    Creatinine 1.26 0.50 - 1.30 mg/dL    eGFR 64 >60 mL/min/1.73m*2    Calcium 8.7 8.6 - 10.3 mg/dL   SST TOP     Collection Time: 01/31/24  6:40 AM   Result Value Ref Range    Extra Tube Hold for add-ons.    POCT GLUCOSE    Collection Time: 01/31/24  7:52 AM   Result Value Ref Range    POCT Glucose 159 (H) 74 - 99 mg/dL   POCT GLUCOSE    Collection Time: 01/31/24 10:56 AM   Result Value Ref Range    POCT Glucose 155 (H) 74 - 99 mg/dL       I&O  I/O last 3 completed shifts:  In: 2491.7 (33.2 mL/kg) [P.O.:900; I.V.:1491.7 (19.9 mL/kg); IV Piggyback:100]  Out: 2200 (29.3 mL/kg) [Urine:2200 (0.8 mL/kg/hr)]  Weight: 75.1 kg       Assessment:    POD 7 second I&D for left knee infection. Pain is stable however does endorse pain to left knee when ambulating, bearing weight and palpation.   There is no erythema, edema or warmth about the left knee.    Plan:    Patient can discharge from an orthopedic standpoint.   He will continue IV rocephin for four weeks per ID note, 2 gram daily with weekly repeat labs  PT/OT: WBAT with no ROM restrictions to operative extremity   Discharge planning: patient to discharge to SNF.    Prabhakar Huerta, SOLEDAD-CNP   1/31/2024 11:09 AM

## 2024-01-31 NOTE — DISCHARGE SUMMARY
Discharge Diagnosis  Arthritis due to other bacteria, left knee (CMS/[facility])    Issues Requiring Follow-Up  Outpatient follow-up with orthopedic surgery and ID office status post I&D for septic arthritis    Discharge Meds     Your medication list        START taking these medications        Instructions Last Dose Given Next Dose Due   cefTRIAXone 2 g in dextrose 5 % 5 % 50 mL IV      Infuse 2 g at 100 mL/hr over 30 minutes into a venous catheter once every 24 hours.       cyclobenzaprine 10 mg tablet  Commonly known as: Flexeril      Take 1 tablet (10 mg) by mouth 3 times a day as needed for muscle spasms for up to 7 days.       docusate sodium 100 mg capsule  Commonly known as: Colace      Take 1 capsule (100 mg) by mouth 2 times a day for 10 days.       enoxaparin 40 mg/0.4 mL syringe  Commonly known as: Lovenox      Inject 0.4 mL (40 mg) under the skin once daily for 21 days.       oxyCODONE 5 mg immediate release tablet  Commonly known as: Roxicodone      Take 1 tablet (5 mg) by mouth every 6 hours if needed for severe pain (7 - 10) or moderate pain (4 - 6) for up to 7 days.              CONTINUE taking these medications        Instructions Last Dose Given Next Dose Due   aspirin 81 mg EC tablet           ertugliflozin 15 mg tablet  Commonly known as: Steglatro      Take 1 tablet (15 mg) by mouth once daily.       famotidine 20 mg tablet  Commonly known as: Pepcid      Take 1 tablet (20 mg) by mouth every 12 hours.       glimepiride 4 mg tablet  Commonly known as: Amaryl      Take 1 tablet (4 mg) by mouth once daily in the morning. Take before meals.       lisinopril 10 mg tablet      Take 1 tablet (10 mg) by mouth once daily.       metFORMIN 1,000 mg tablet  Commonly known as: Glucophage           rosuvastatin 10 mg tablet  Commonly known as: Crestor      Take 1 tablet (10 mg) by mouth once daily.       semaglutide 3 mg tablet  Commonly known as: Rybelsus      Take 1 tablet (3 mg) by mouth once daily.       Vision  Plus Lutein  Generic drug: multivitamin with minerals iron-free                  STOP taking these medications      traMADol 50 mg tablet  Commonly known as: Ultram                  Where to Get Your Medications        You can get these medications from any pharmacy    Bring a paper prescription for each of these medications  oxyCODONE 5 mg immediate release tablet       Information about where to get these medications is not yet available    Ask your nurse or doctor about these medications  cefTRIAXone 2 g in dextrose 5 % 5 % 50 mL IV  cyclobenzaprine 10 mg tablet  docusate sodium 100 mg capsule  enoxaparin 40 mg/0.4 mL syringe         Test Results Pending At Discharge  Pending Labs       Order Current Status    Extra Urine Gray Tube Collected (01/26/24 1013)    Staphylococcus Aureus/MRSA Colonization, Culture - PICC Only In process    Urinalysis with Reflex Culture and Microscopic In process            Hospital Course  Melchor Bergeron is 63 y.o. male with history of osteoarthritis, hypertension, hyperlipidemia, type 2 diabetes poorly controlled, GERD presented with left knee swelling, patient had steroid injection to the left knee on 1/15/2024 and started having swelling after 48 hours.     #Septic arthritis of the left knee status post intra-articular steroid injection  #Arthritis of left sternoclavicular joint  #Streptococcus mitis infection  #Fever, tachycardia resolved   #RUTH likely prerenal  #Poorly controlled type 2 diabetes, hemoglobin A1c 15 on 12/23  #HTN, HLD     Status post arthrocentesis was growing Streptococcus  Patient was initially placed on Rocephin but he started having fever and tachycardia and IV antibiotic was switched to cefepime and vancomycin per ID recommendation.  After 24 hours his symptoms improved and IV antibiotic was switched to Rocephin 2 g daily.  PICC line was placed per ID recommendation.  He did well on Rocephin 2 g.  He was also hypotensive during the hospital stay, was started  on IV fluid hydration.  He also had some sort of RUTH which improved with IV fluid hydration.  Monitored renal function, avoided nephrotoxic agent  ISS, hypoglycemia protocol, POCT glucose, DM diet, all were placed.   Continued with home meds for other comorbidities  DVT prophylaxis: was with Lovenox subcu  Benadryl for allergic reaction was also considered   PT/OT recommended SNF placement.      Within the past 24 hours patient has been hemodynamically stable and ready for discharge to SNF for better recovery.  He will be continued on IV Rocephin 2 g daily for 4 weeks.  He will follow-up as outpatient with ID office for further recommendation.  He needs weekly lab workup.  He will be continued on rest of his home medication.    Pertinent Physical Exam At Time of Discharge  Physical Exam    General: Well-developed male, in no acute distress  HEENT: AT, NC, no JVD, no lymphadenopathy, neck supple  Lungs: Clear, no wheezing, no crackles  Cardiac: Normal S1-S2, no murmur, no gallop  Abdomen: Soft, nontender, no distention, positive bowel sound  Extremities: Left knee swelling, LLE diminished ROM, pulses intact  Neurological: Alert awake oriented x3, sensation intact, clear speech  Skin: rash on abdomen    Outpatient Follow-Up  Future Appointments   Date Time Provider Department Center   2/7/2024  4:30 PM PHARMACY [facility] [id number]   3/19/2024  3:00 PM Alex Britt DO [email or web address] [place]   3/29/2024  3:15 PM Florentin Marti MD [id number] [place]       Time spent 45 minutes   Kaley Barry MD

## 2024-01-31 NOTE — PROGRESS NOTES
01/31/24 1354   Discharge Planning   Home or Post Acute Services In home services   Type of Post Acute Facility Services Skilled nursing;Rehab   Patient expects to be discharged to: [facility]   Does the patient need discharge transport arranged? Yes   RoundTrip coordination needed? Yes   Has discharge transport been arranged? Yes   What day is the transport expected? 01/31/24   What time is the transport expected? 1600     Pts PICC placed today and per Attending MD pt medically ready for DC to SNF today. [place] complete and DC order in place. Pt/significant other agreeable to DC to [facility] today. DORIE Canas notified and has set up ambulette transport through [facility] scheduled for 4 PM and requested 7000.

## 2024-02-01 LAB — STAPHYLOCOCCUS SPEC CULT: NORMAL

## 2024-02-06 ENCOUNTER — APPOINTMENT (OUTPATIENT)
Dept: RADIOLOGY | Facility: HOSPITAL | Age: 64
End: 2024-02-06
Payer: COMMERCIAL

## 2024-02-06 ENCOUNTER — HOSPITAL ENCOUNTER (EMERGENCY)
Facility: HOSPITAL | Age: 64
Discharge: HOME | End: 2024-02-06
Payer: COMMERCIAL

## 2024-02-06 VITALS
HEIGHT: 69 IN | TEMPERATURE: 97.7 F | OXYGEN SATURATION: 98 % | BODY MASS INDEX: 23.55 KG/M2 | RESPIRATION RATE: 18 BRPM | WEIGHT: 159 LBS | SYSTOLIC BLOOD PRESSURE: 91 MMHG | DIASTOLIC BLOOD PRESSURE: 52 MMHG | HEART RATE: 93 BPM

## 2024-02-06 DIAGNOSIS — M25.462 EFFUSION, LEFT KNEE: Primary | ICD-10-CM

## 2024-02-06 LAB
ALBUMIN SERPL BCP-MCNC: 3.2 G/DL (ref 3.4–5)
ALP SERPL-CCNC: 137 U/L (ref 33–136)
ALT SERPL W P-5'-P-CCNC: 30 U/L (ref 10–52)
ANION GAP SERPL CALC-SCNC: 13 MMOL/L (ref 10–20)
AST SERPL W P-5'-P-CCNC: 27 U/L (ref 9–39)
BASOPHILS # BLD AUTO: 0.07 X10*3/UL (ref 0–0.1)
BASOPHILS NFR BLD AUTO: 1 %
BILIRUB SERPL-MCNC: 0.3 MG/DL (ref 0–1.2)
BUN SERPL-MCNC: 39 MG/DL (ref 6–23)
CALCIUM SERPL-MCNC: 9.6 MG/DL (ref 8.6–10.3)
CHLORIDE SERPL-SCNC: 100 MMOL/L (ref 98–107)
CO2 SERPL-SCNC: 26 MMOL/L (ref 21–32)
CREAT SERPL-MCNC: 1.25 MG/DL (ref 0.5–1.3)
EGFRCR SERPLBLD CKD-EPI 2021: 65 ML/MIN/1.73M*2
EOSINOPHIL # BLD AUTO: 0.26 X10*3/UL (ref 0–0.7)
EOSINOPHIL NFR BLD AUTO: 3.8 %
ERYTHROCYTE [DISTWIDTH] IN BLOOD BY AUTOMATED COUNT: 13.9 % (ref 11.5–14.5)
GLUCOSE SERPL-MCNC: 81 MG/DL (ref 74–99)
HCT VFR BLD AUTO: 39.9 % (ref 41–52)
HGB BLD-MCNC: 13.1 G/DL (ref 13.5–17.5)
IMM GRANULOCYTES # BLD AUTO: 0.02 X10*3/UL (ref 0–0.7)
IMM GRANULOCYTES NFR BLD AUTO: 0.3 % (ref 0–0.9)
LACTATE SERPL-SCNC: 1.5 MMOL/L (ref 0.4–2)
LYMPHOCYTES # BLD AUTO: 1.15 X10*3/UL (ref 1.2–4.8)
LYMPHOCYTES NFR BLD AUTO: 16.7 %
MCH RBC QN AUTO: 28.1 PG (ref 26–34)
MCHC RBC AUTO-ENTMCNC: 32.8 G/DL (ref 32–36)
MCV RBC AUTO: 86 FL (ref 80–100)
MONOCYTES # BLD AUTO: 0.43 X10*3/UL (ref 0.1–1)
MONOCYTES NFR BLD AUTO: 6.2 %
NEUTROPHILS # BLD AUTO: 4.96 X10*3/UL (ref 1.2–7.7)
NEUTROPHILS NFR BLD AUTO: 72 %
NRBC BLD-RTO: 0 /100 WBCS (ref 0–0)
PLATELET # BLD AUTO: 380 X10*3/UL (ref 150–450)
POTASSIUM SERPL-SCNC: 3.8 MMOL/L (ref 3.5–5.3)
PROT SERPL-MCNC: 7.1 G/DL (ref 6.4–8.2)
RBC # BLD AUTO: 4.66 X10*6/UL (ref 4.5–5.9)
SODIUM SERPL-SCNC: 135 MMOL/L (ref 136–145)
WBC # BLD AUTO: 6.9 X10*3/UL (ref 4.4–11.3)

## 2024-02-06 PROCEDURE — 83605 ASSAY OF LACTIC ACID: CPT | Performed by: REGISTERED NURSE

## 2024-02-06 PROCEDURE — 93971 EXTREMITY STUDY: CPT

## 2024-02-06 PROCEDURE — 99284 EMERGENCY DEPT VISIT MOD MDM: CPT | Mod: 25

## 2024-02-06 PROCEDURE — 73562 X-RAY EXAM OF KNEE 3: CPT | Mod: LT

## 2024-02-06 PROCEDURE — 73562 X-RAY EXAM OF KNEE 3: CPT | Mod: LEFT SIDE | Performed by: RADIOLOGY

## 2024-02-06 PROCEDURE — 84075 ASSAY ALKALINE PHOSPHATASE: CPT | Performed by: REGISTERED NURSE

## 2024-02-06 PROCEDURE — 36415 COLL VENOUS BLD VENIPUNCTURE: CPT | Performed by: REGISTERED NURSE

## 2024-02-06 PROCEDURE — 85025 COMPLETE CBC W/AUTO DIFF WBC: CPT | Performed by: REGISTERED NURSE

## 2024-02-06 PROCEDURE — 2500000001 HC RX 250 WO HCPCS SELF ADMINISTERED DRUGS (ALT 637 FOR MEDICARE OP): Performed by: REGISTERED NURSE

## 2024-02-06 PROCEDURE — 87040 BLOOD CULTURE FOR BACTERIA: CPT | Mod: 59,ELYLAB | Performed by: REGISTERED NURSE

## 2024-02-06 RX ORDER — TRAMADOL HYDROCHLORIDE 50 MG/1
50 TABLET ORAL ONCE
Status: COMPLETED | OUTPATIENT
Start: 2024-02-06 | End: 2024-02-06

## 2024-02-06 RX ADMIN — TRAMADOL HYDROCHLORIDE 50 MG: 50 TABLET, COATED ORAL at 14:48

## 2024-02-06 ASSESSMENT — LIFESTYLE VARIABLES
HAVE PEOPLE ANNOYED YOU BY CRITICIZING YOUR DRINKING: NO
EVER FELT BAD OR GUILTY ABOUT YOUR DRINKING: NO
EVER HAD A DRINK FIRST THING IN THE MORNING TO STEADY YOUR NERVES TO GET RID OF A HANGOVER: NO
HAVE YOU EVER FELT YOU SHOULD CUT DOWN ON YOUR DRINKING: NO

## 2024-02-06 ASSESSMENT — COLUMBIA-SUICIDE SEVERITY RATING SCALE - C-SSRS
6. HAVE YOU EVER DONE ANYTHING, STARTED TO DO ANYTHING, OR PREPARED TO DO ANYTHING TO END YOUR LIFE?: NO
1. IN THE PAST MONTH, HAVE YOU WISHED YOU WERE DEAD OR WISHED YOU COULD GO TO SLEEP AND NOT WAKE UP?: NO
2. HAVE YOU ACTUALLY HAD ANY THOUGHTS OF KILLING YOURSELF?: NO

## 2024-02-06 ASSESSMENT — PAIN - FUNCTIONAL ASSESSMENT: PAIN_FUNCTIONAL_ASSESSMENT: 0-10

## 2024-02-06 ASSESSMENT — PAIN SCALES - GENERAL
PAINLEVEL_OUTOF10: 6
PAINLEVEL_OUTOF10: 5 - MODERATE PAIN

## 2024-02-06 NOTE — ED PROVIDER NOTES
HPI   Chief Complaint   Patient presents with    Post-op Problem     Pt had left knee surgery and got an infection per family  he is declining, more pain, swelling and blood sugars have been elevated         History provided by:  Patient and spouse   used: No      63-year-old male with past medical history significant for hypertension, hyperlipidemia, poorly controlled diabetes, GERD, and recent I&D secondary to septic arthritis of left knee due to cortisone injection  (1/15/24) presents emergency department today for evaluation of pain and swelling to his left knee.  Patient tells me that he is at [facility] for rehab and for 4 weeks IV Rocephin post I&D.  Patient's wife was at bedside tells me that she wanted patient to be evaluated however she was told by the [facility] nursing [facility] that if they called EMS he would be taken to [facility] therefore she brought him herself.  She tells me the patient has had significant pain and has not been able to participate in therapy.  She tells me that he also started to develop swelling on his left knee and lower extremity since discharge to [facility] on 1/31/2024.  Patient's wife also tells me that he is experiencing labile blood sugars, she explains to me that yesterday his blood sugars were in the 30s.  She is unsure if the staff at [facility] had called patient's orthopedic surgeon and is generally unhappy with his care there.  Patient denies any fever or chills but does report generalized fatigue.  Patient and wife tells me that he was taking oxycodone for pain at the [facility] nursing [facility] however this was giving him hallucinations and they would not like him to have any more of that medication.  Patient denies any chest pain, shortness of breath, dizziness or lightheadedness, numbness or tingling, urinary symptoms, headache, abdominal pain, nausea or vomiting.                  Vera Coma Scale Score: 15                     Patient History   Past  Medical History:   Diagnosis Date    Tendinitis 12/14/2023     Past Surgical History:   Procedure Laterality Date    OTHER SURGICAL HISTORY  12/10/2020    Cataract surgery    OTHER SURGICAL HISTORY  12/10/2020    Tonsillectomy     Family History   Problem Relation Name Age of Onset    Cancer Mother      Heart disease Father      Dementia Father      Diabetes Sister x1     No Known Problems Brother x2      Social History     Tobacco Use    Smoking status: Never     Passive exposure: Never    Smokeless tobacco: Never   Substance Use Topics    Alcohol use: Never    Drug use: Never       Physical Exam   ED Triage Vitals [02/06/24 1348]   Temperature Heart Rate Respirations BP   36.5 °C (97.7 °F) 58 16 105/55      Pulse Ox Temp src Heart Rate Source Patient Position   96 % -- -- --      BP Location FiO2 (%)     -- --       Physical Exam  Vitals and nursing note reviewed.   Constitutional:       Appearance: Normal appearance.   Cardiovascular:      Rate and Rhythm: Normal rate and regular rhythm.      Pulses: Normal pulses.   Pulmonary:      Effort: Pulmonary effort is normal.      Breath sounds: Normal breath sounds.   Abdominal:      Palpations: Abdomen is soft.   Musculoskeletal:      Right knee: Normal.      Left knee: Swelling and bony tenderness present.      Right lower leg: Normal.      Left lower leg: Pitting Edema present.      Left ankle: Swelling present.      Left foot: Swelling present.   Skin:     General: Skin is warm and dry.      Capillary Refill: Capillary refill takes less than 2 seconds.   Neurological:      General: No focal deficit present.      Mental Status: He is alert and oriented to person, place, and time.   Psychiatric:         Mood and Affect: Mood normal.         Behavior: Behavior normal.         ED Course & MDM   Diagnoses as of 02/06/24 1654   Effusion, left knee       Medical Decision Making  Patient seen examined the emergency department; patient is healthy and nontoxic in appearance  does not appear any acute distress.  Clinical exam significant for swelling of the left knee and left lower extremity.  Patient does have +1 pitting edema to foot and ankle.  Patient's bilateral lower extremities are cool to touch.  Skin is warm and dry and does fernanda.  Pulses palpable bilaterally.  Patient is neurovascularly intact distally in bilateral lower extremities.  Lung sounds are clear without any adventitious noises.  Heart regular rate and rhythm no murmur noted.  Will obtain x-ray of left knee as well as ultrasound of left lower extremity to evaluate for DVT.  Will also order CBC, CMP, lactate and blood cultures.  Patient treated with tramadol for pain.    Patient's lactate is 1.5, CMP significant for a sodium of 135 and a BUN of 39/creatinine 1.25, patient CBC with an H&H of 13.1/39.9.  Imaging of left knee shows mild anterior soft tissue swelling with moderate suprapatellar effusion however there is no destructive bone lesion and no acute fracture or dislocation.  Ultrasound of left lower extremity negative for DVT.    I did discuss imaging of left knee with Dr. Stewart with orthopedics who recommends for patient to continue IV antibiotics at the skilled nursing facility and to call to have appointment scheduled this week for evaluation in the office.  I did relay this information to the patient and his wife.  We discussed that the swelling of the joint could persist for the next few weeks.  We also discussed patient asking to have his pain medications at the skilled nursing facility prior to therapy for better participation.  All patient and wife's questions and concerns were addressed prior to discharge.  Patient discharged home in stable condition.        Procedure  Procedures     Isidra Benjamin, SOLEDAD-CNP  02/06/24 5396

## 2024-02-07 ENCOUNTER — LAB (OUTPATIENT)
Dept: LAB | Facility: LAB | Age: 64
End: 2024-02-07
Payer: COMMERCIAL

## 2024-02-07 ENCOUNTER — OFFICE VISIT (OUTPATIENT)
Dept: ORTHOPEDIC SURGERY | Facility: CLINIC | Age: 64
End: 2024-02-07
Payer: COMMERCIAL

## 2024-02-07 ENCOUNTER — APPOINTMENT (OUTPATIENT)
Dept: PHARMACY | Facility: HOSPITAL | Age: 64
End: 2024-02-07
Payer: COMMERCIAL

## 2024-02-07 DIAGNOSIS — M25.562 ARTHRALGIA OF LEFT KNEE: ICD-10-CM

## 2024-02-07 DIAGNOSIS — M25.50 ARTHRALGIA, UNSPECIFIED JOINT: ICD-10-CM

## 2024-02-07 DIAGNOSIS — M25.462 KNEE EFFUSION, LEFT: ICD-10-CM

## 2024-02-07 DIAGNOSIS — T84.038A ASEPTIC LOOSENING OF PROSTHETIC KNEE, INITIAL ENCOUNTER (CMS-HCC): ICD-10-CM

## 2024-02-07 DIAGNOSIS — T84.038A ASEPTIC LOOSENING OF PROSTHETIC HIP, INITIAL ENCOUNTER (CMS-HCC): ICD-10-CM

## 2024-02-07 DIAGNOSIS — Z96.659 ASEPTIC LOOSENING OF PROSTHETIC KNEE, INITIAL ENCOUNTER (CMS-HCC): ICD-10-CM

## 2024-02-07 DIAGNOSIS — Z96.649 ASEPTIC LOOSENING OF PROSTHETIC HIP, INITIAL ENCOUNTER (CMS-HCC): ICD-10-CM

## 2024-02-07 DIAGNOSIS — M00.9: ICD-10-CM

## 2024-02-07 LAB
ANION GAP SERPL CALC-SCNC: 18 MMOL/L (ref 10–20)
BASOPHILS # BLD AUTO: 0.08 X10*3/UL (ref 0–0.1)
BASOPHILS NFR BLD AUTO: 1.1 %
BUN SERPL-MCNC: 38 MG/DL (ref 6–23)
CALCIUM SERPL-MCNC: 9.4 MG/DL (ref 8.6–10.3)
CHLORIDE SERPL-SCNC: 102 MMOL/L (ref 98–107)
CO2 SERPL-SCNC: 24 MMOL/L (ref 21–32)
CREAT SERPL-MCNC: 1.24 MG/DL (ref 0.5–1.3)
CRP SERPL-MCNC: 12.91 MG/DL
EGFRCR SERPLBLD CKD-EPI 2021: 66 ML/MIN/1.73M*2
EOSINOPHIL # BLD AUTO: 0.24 X10*3/UL (ref 0–0.7)
EOSINOPHIL NFR BLD AUTO: 3.2 %
ERYTHROCYTE [DISTWIDTH] IN BLOOD BY AUTOMATED COUNT: 14.1 % (ref 11.5–14.5)
GLUCOSE SERPL-MCNC: 77 MG/DL (ref 74–99)
HCT VFR BLD AUTO: 38.6 % (ref 41–52)
HGB BLD-MCNC: 12.1 G/DL (ref 13.5–17.5)
IMM GRANULOCYTES # BLD AUTO: 0.01 X10*3/UL (ref 0–0.7)
IMM GRANULOCYTES NFR BLD AUTO: 0.1 % (ref 0–0.9)
LYMPHOCYTES # BLD AUTO: 1.34 X10*3/UL (ref 1.2–4.8)
LYMPHOCYTES NFR BLD AUTO: 17.9 %
MCH RBC QN AUTO: 26.8 PG (ref 26–34)
MCHC RBC AUTO-ENTMCNC: 31.3 G/DL (ref 32–36)
MCV RBC AUTO: 86 FL (ref 80–100)
MONOCYTES # BLD AUTO: 0.49 X10*3/UL (ref 0.1–1)
MONOCYTES NFR BLD AUTO: 6.6 %
NEUTROPHILS # BLD AUTO: 5.32 X10*3/UL (ref 1.2–7.7)
NEUTROPHILS NFR BLD AUTO: 71.1 %
NRBC BLD-RTO: 0 /100 WBCS (ref 0–0)
PLATELET # BLD AUTO: 327 X10*3/UL (ref 150–450)
POTASSIUM SERPL-SCNC: 5.1 MMOL/L (ref 3.5–5.3)
RBC # BLD AUTO: 4.51 X10*6/UL (ref 4.5–5.9)
SODIUM SERPL-SCNC: 139 MMOL/L (ref 136–145)
WBC # BLD AUTO: 7.5 X10*3/UL (ref 4.4–11.3)

## 2024-02-07 PROCEDURE — 86140 C-REACTIVE PROTEIN: CPT

## 2024-02-07 PROCEDURE — 1036F TOBACCO NON-USER: CPT | Performed by: STUDENT IN AN ORGANIZED HEALTH CARE EDUCATION/TRAINING PROGRAM

## 2024-02-07 PROCEDURE — 99024 POSTOP FOLLOW-UP VISIT: CPT | Performed by: STUDENT IN AN ORGANIZED HEALTH CARE EDUCATION/TRAINING PROGRAM

## 2024-02-07 PROCEDURE — 80048 BASIC METABOLIC PNL TOTAL CA: CPT

## 2024-02-07 PROCEDURE — 4010F ACE/ARB THERAPY RXD/TAKEN: CPT | Performed by: STUDENT IN AN ORGANIZED HEALTH CARE EDUCATION/TRAINING PROGRAM

## 2024-02-07 PROCEDURE — 85025 COMPLETE CBC W/AUTO DIFF WBC: CPT

## 2024-02-09 NOTE — PROGRESS NOTES
Chief Complaint   Patient presents with    Left Knee - Post-op     Lt knee arthroscopy  DOS: 1/24/2024  Knee effusion       HPI  Patient presents today for follow up of his left knee.  Patient presents today for early follow-up.  Patient well-known to me with history of septic arthritis status post knee cortisone injection.  He required 2 irrigation debridements admitted to the hospital for several days.  Currently on IV antibiotics.  Has not followed up with infectious disease yet.  Presents today for early follow-up as he has been having some worsening type pain about his knee he is having a hard time with physical therapy due to overlies weakness as well as pain about his knee.  States that the pain has been rather constant in nature.  Pain about the shoulder his significantly improved.      Physical exam  General: Alert and oriented to place, person, and time.  No acute distress and breathing comfortably; pleasant and cooperative with the examination.  Extremity:  Focused examination of left knee: Overlying skin is clean dry intact no surrounding erythema about the knee prior portal sites well-healed no active discharge.  Mild to moderate swelling about the knee no appreciable effusion on examination.  Pain with range of motion of the knee in any plane.    Diagnostics:  Lower extremity venous duplex left    Result Date: 2/6/2024  Interpreted By:  Lisa Peguero, STUDY: [facility] LOWER EXTREMITY VENOUS DUPLEX LEFT  2/6/2024 4:02 pm   INDICATION: 62 y/o   M with  Signs/Symptoms:pain swelling. LMP:  Unknown.   COMPARISON: 02/18/2021 left lower extremity   ACCESSION NUMBER(S): [id number]   ORDERING CLINICIAN: IJEOMA LACY   TECHNIQUE: Routine ultrasound of the  left lower extremity was performed with duplex Doppler (color and spectral) evaluation.   Static images were obtained for remote interpretation.   FINDINGS: THIGH VEINS:  The common femoral, femoral, popliteal, proximal medial saphenous, and deep  femoral veins are patent and free of thrombus. The veins are normally compressible.  They demonstrate normal phasic flow and augmentation response.   CALF VEINS:  The paired peroneal and posterior tibial calf veins are patent.       Negative study.  No deep venous thrombosis of the  left lower extremity.   MACRO: None   Signed by: Lisa Peguero 2/6/2024 4:17 PM Dictation workstation:   [id number]    XR knee left 3 views    Result Date: 2/6/2024  Interpreted By:  Kevin Carrillo, STUDY: XR KNEE LEFT 3 VIEWS;  2/6/2024 3:02 pm   INDICATION: Signs/Symptoms:pain swelling s/p surgery.   COMPARISON: Previous exam from 01/15/2024   ACCESSION NUMBER(S): [id number]   ORDERING CLINICIAN: IJEOMA LACY   TECHNIQUE: 3 views  of the  knee were obtained.   FINDINGS: Moderate medial compartment joint space loss with spur formation. Mild lateral compartment spur formation. Sharpening of the medial tibial spine. Moderate patellofemoral joint space loss with spur formation. Moderate fullness in the suprapatellar bursa. Patellar osteophyte at the insertion of the quadriceps tendon. Mild anterior left knee soft tissue swelling. No lytic or blastic destructive bone lesion. No acute fracture or dislocation. No opaque soft tissue foreign body. No periosteal reaction or erosion.       Moderate left knee DJD as described.   Mild anterior soft tissue swelling. Moderate suprapatellar effusion.   No destructive bone lesion. No acute fracture or dislocation.   MACRO: None   Signed by: Kevin Carrillo 2/6/2024 3:11 PM Dictation workstation:   [id number]    Bedside PICC Imaging    Result Date: 1/31/2024  These images are not reportable by radiology and will not be interpreted by  Radiologists.    CT guided fine percutaneous aspiration    Result Date: 1/28/2024  Interpreted By:  Dhaval Vazquez, STUDY: CT GUIDED FINE PERCUTANEOUS ASPIRATION;  1/26/2024 11:07 am   INDICATION: Signs/Symptoms:fluid aspiration for analysis and culture.    COMPARISON: None.   ACCESSION NUMBER(S): [id number]   ORDERING CLINICIAN: RALEIGH SALINAS   TECHNIQUE: The procedure is explained to the patient including the potential risks and benefits. Understanding the risks, the patient agrees to have the procedure performed.   FINDINGS: The previous studies are reviewed including an MRI of the left chest wall and shoulder performed 01/23/2024. This study demonstrates high signal in the 1st left costochondral junction with surrounding soft tissue edema extending along the chest wall and into the pectoralis muscle.   Preliminary images of the left 1st costochondral junction demonstrates some widening of the junction. Surrounding soft tissue edema may have decreased from the previous MRI.   The paramidline left upper thorax is prepped and draped in the usual sterile fashion. 1% lidocaine is used to anesthetize the skin and underlying soft tissues from the skin surface to the joint space. Under CT guidance, a 22 gauge spinal needles inserted into the 1st costochondral junction. Multiple aspirations at this site produce no fluid. 0.5 cc of sterile saline is slowly infused into the joint and additional aspiration is performed which yielded no fluid.   The study is concluded at this point. The needle was withdrawn hemostasis is achieved. The patient is returned to the floor in stable condition.       Multiple CT aspirations of the left costochondral junction, at the site of MRI abnormality, yields no fluid. The studies are conveyed to the ordering provider at the time of the study through [employer] secure chat. Confirmation of receipt of the information is immediately received.   Signed by: Dhaval Vazquez 1/28/2024 11:17 AM Dictation workstation:   [id number]    ECG 12 lead    Result Date: 1/27/2024  Sinus tachycardia Inferior infarct (cited on or before 22-JAN-2024) Abnormal ECG When compared with ECG of 22-JAN-2024 15:47, Nonspecific T wave abnormality no longer evident in  Lateral leads Confirmed by Ramón Li ([id number]) on 1/27/2024 5:07:00 PM    XR chest 1 view    Result Date: 1/26/2024  Interpreted By:  Scott Arzola, STUDY: XR CHEST 1 VIEW;  1/26/2024 5:45 am   INDICATION: Signs/Symptoms:Evaluate Pleural Effusion.   COMPARISON: None   ACCESSION NUMBER(S): [id number]   ORDERING CLINICIAN: NICOLE PADILLA   TECHNIQUE: Single frontal view of the chest; Portable technique   FINDINGS:   LINES AND TUBES: n/a   HEART AND MEDIASTINUM: Heart size: Within normal limits Thoracic aorta: Within expected limits Edna and nonvascular: within normal limits   PULMONARY VESSELS: Within normal limits; no sign of edema   LUNGS AND AIRWAYS: Linear opacity near left lung base is probably scar or atelectasis. Right midlung granulomas. No acute disease   PLEURA: Effusion: No substantial pleural effusion on either side Pneumothorax: No substantial pneumothorax on either side Other: n/a   BONES: No acute findings       NO ACUTE DISEASE IN THE CHEST INCLUDING NO SUBSTANTIAL SIZED PLEURAL EFFUSION   MACRO: None   Signed by: Scott Arzola 1/26/2024 7:02 AM Dictation workstation:   [id number]    Transthoracic Echo (TTE) Complete    Result Date: 1/25/2024          [place] [id number]  Tel [phone] Fax [phone] TRANSTHORACIC ECHOCARDIOGRAM REPORT  Patient Name:      AMARJIT Benítez Physician:    [id number] Dhaval Wisdom DO Study Date:        1/25/2024            Ordering Provider:    [id number] YULISSA DWYER MRN/PID:           [id number]             Fellow: Accession#:        [id number]         Nurse:                Tad Bernard RN Date of Birth/Age: 1960 / 63 years Sonographer:          Felecia GASTELUM Gender:            M                    Additional Staff:  Height:            175.26 cm            Admit Date:           1/19/2024 Weight:            74.84 kg             Admission Status:     Inpatient -                                                               Routine BSA:               1.90 m2              Department Location:  [facility]                                                               Echo Lab Blood Pressure: 149 /72 mmHg Study Type:    TRANSTHORACIC ECHO (TTE) COMPLETE Diagnosis/ICD: Sepsis due to other specified staphylococcus-A41.1 Indication:    SEPSIS, ENDOCARDITIS CPT Codes:     Echo Complete w Full [id number] Patient History: Diabetes:          Yes Pertinent History: HTN, Hyperlipidemia and Sepsis. Study Detail: The following Echo studies were performed: 2D, M-Mode, Doppler and               color flow. Technically challenging study due to poor acoustic               windows and prominent lung artifact. Definity used as a contrast               agent for endocardial border definition. Total contrast used for               this procedure was 1.5 mL via IV push. Unable to obtain IVC view.               The patient was awake.  PHYSICIAN INTERPRETATION: Left Ventricle: Left ventricular systolic function is normal, with an estimated ejection fraction of 60-65%. There are no regional wall motion abnormalities. The left ventricular cavity size is normal. There is mild concentric left ventricular hypertrophy. Spectral Doppler shows an impaired relaxation pattern of left ventricular diastolic filling. LV Wall Scoring: All segments are normal. Left Atrium: The left atrium is normal in size. Right Ventricle: The right ventricle is normal in size. There is normal right ventricular global systolic function. Right Atrium: The right atrium is normal in size. Aortic Valve: The aortic valve appears structurally normal. The aortic valve appears tricuspid. There is mild aortic valve cusp calcification. There is no evidence of aortic valve stenosis. There is  no evidence of aortic valve regurgitation. The peak instantaneous gradient of the aortic valve is 6.8 mmHg. The mean gradient of the aortic valve is 4.0 mmHg. Mitral Valve: The mitral valve is normal in structure. There is no evidence of mitral valve stenosis. There is normal mitral valve leaflet mobility. There is mild mitral annular calcification. There is no evidence of mitral valve regurgitation. Tricuspid Valve: The tricuspid valve is structurally normal. There is normal tricuspid valve leaflet mobility. There is trace tricuspid regurgitation. Pulmonic Valve: The pulmonic valve is not well visualized. There is no indication of pulmonic valve regurgitation. Pericardium: There is no pericardial effusion noted. Aorta: The aortic root is normal. Pulmonary Artery: The main pulmonary artery is normal in size, and position, with normal bifurcation into the left and right pulmonary arteries. Systemic Veins: The inferior vena cava was not well visualized. In comparison to the previous echocardiogram(s): There are no prior studies on this patient for comparison purposes.  CONCLUSIONS:  1. Left ventricular systolic function is normal with a 60-65% estimated ejection fraction.  2. No definite valvular vegetations were visualized.  3. Spectral Doppler shows an impaired relaxation pattern of left ventricular diastolic filling.  4. There is no evidence of mitral valve stenosis.  5. No evidence of mitral valve regurgitation.  6. Trace tricuspid regurgitation is visualized.  7. Aortic valve stenosis is not present.  8. The main pulmonary artery is normal in size, and position, with normal bifurcation into the left and right pulmonary arteries. RECOMMENDATIONS: Technically suboptimal and limited study, therefore accuracy of above interpretation could be substantially diminished. Clinical correlation is advised. Consider additional imaging modalities if clinically indicated. Transthoracic echo has low negative predictive value  for mass, vegetation, or embolic source. Consider KENYA or MRI if clinically indicated.  QUANTITATIVE DATA SUMMARY: 2D MEASUREMENTS:                          Normal Ranges: Ao Root d:     3.30 cm   (2.0-3.7cm) LAs:           3.10 cm   (2.7-4.0cm) IVSd:          1.13 cm   (0.6-1.1cm) LVPWd:         1.19 cm   (0.6-1.1cm) LVIDd:         3.68 cm   (3.9-5.9cm) LVIDs:         2.52 cm LV Mass Index: 72.9 g/m2 LV % FS        31.5 % LA VOLUME:                               Normal Ranges: LA Vol A4C:        43.8 ml    (22+/-6mL/m2) LA Vol A2C:        31.9 ml LA Vol BP:         39.4 ml LA Vol Index A4C:  23.0ml/m2 LA Vol Index A2C:  16.8 ml/m2 LA Vol Index BP:   20.7 ml/m2 LA Area A4C:       16.4 cm2 LA Area A2C:       13.3 cm2 LA Major Axis A4C: 5.2 cm LA Major Axis A2C: 4.7 cm LA Volume Index:   19.5 ml/m2 LA Vol A4C:        42.0 ml LA Vol A2C:        30.0 ml RA VOLUME BY A/L METHOD:                               Normal Ranges: RA Vol A4C:        41.3 ml    (8.3-19.5ml) RA Vol Index A4C:  21.7 ml/m2 RA Area A4C:       16.2 cm2 RA Major Axis A4C: 5.4 cm AORTA MEASUREMENTS:                    Normal Ranges: Asc Ao, d: 3.50 cm (2.1-3.4cm) LV SYSTOLIC FUNCTION BY 2D PLANIMETRY (MOD):                     Normal Ranges: EF-A4C View: 66.7 % (>=55%) EF-A2C View: 67.1 % EF-Biplane:  65.8 % LV DIASTOLIC FUNCTION:                           Normal Ranges: MV Peak E:     0.82 m/s   (0.7-1.2 m/s) MV Peak A:     1.00 m/s   (0.42-0.7 m/s) E/A Ratio:     0.82       (1.0-2.2) MV lateral e'  0.11 m/s MV medial e'   0.08 m/s E/e' Ratio:    7.20       (<8.0) PulmV Sys Mann: 54.10 cm/s MITRAL VALVE:                Normal Ranges: MV DT: 82 msec (150-240msec) AORTIC VALVE:                                   Normal Ranges: AoV Vmax:                1.30 m/s (<=1.7m/s) AoV Peak P.8 mmHg (<20mmHg) AoV Mean P.0 mmHg (1.7-11.5mmHg) LVOT Max Mann:            1.12 m/s (<=1.1m/s) AoV VTI:                 21.10 cm (18-25cm) LVOT  VTI:                15.10 cm LVOT Diameter:           2.00 cm  (1.8-2.4cm) AoV Area, VTI:           2.25 cm2 (2.5-5.5cm2) AoV Area,Vmax:           2.71 cm2 (2.5-4.5cm2) AoV Dimensionless Index: 0.72  RIGHT VENTRICLE: RV Basal 3.49 cm RV Mid   2.56 cm RV Major 7.4 cm TAPSE:   28.7 mm RV s'    0.23 m/s TRICUSPID VALVE/RVSP:                             Normal Ranges: Peak TR Velocity: 2.54 m/s RV Syst Pressure: 28.8 mmHg (< 30mmHg) PULMONIC VALVE:                         Normal Ranges: PV Accel Time: 85 msec  (>120ms) PV Max Mann:    1.3 m/s  (0.6-0.9m/s) PV Max P.0 mmHg Pulmonary Veins: PulmV Sys Mann: 54.10 cm/s  [id number] Dhaval Wisdom DO Electronically signed on 2024 at 11:14:32 AM  Wall Scoring  ** Final **     ECG 12 lead    Result Date: 2024  Sinus tachycardia Inferior infarct , age undetermined Abnormal ECG No previous ECGs available Confirmed by Ramón Li ([id number]) on 2024 5:02:31 PM    MR knee left wo IV contrast    Result Date: 2024  Interpreted By:  Dhaval Vazquez, STUDY: MR KNEE LEFT WO IV CONTRAST;  2024 9:44 am   INDICATION: Signs/Symptoms:history septic arthritis rule out other abscess.   COMPARISON: None.   ACCESSION NUMBER(S): [id number]   ORDERING CLINICIAN: GERMAN HUGHES   TECHNIQUE: Multiplanar and multisequential MR images of the left knee are performed. Intravenous gadolinium is not administered. The study is limited by motion degradation.   FINDINGS: There is a moderate-sized joint effusion. There is some synovial lipomatous hypertrophy at the suprapatellar bursa with lobular peripheral fat signal. There are 2 rounded foci of low T1 weighted and T2 weighted signal with mild susceptibility. These findings are identified medial of midline. The larger focus measures 11 mm in maximum diameter. The smaller focus measures 5 mm in maximum diameter. These findings could reflect loose bodies or possibly air. In addition to fluid and fat signal there is some  lobular signal in the suprapatellar bursa which is of intermediate to low proton density signal and low signal and fat saturated T2 weighted sequences. This appearance could be related to blood product. There is some edema and fluid in Hoffa's fat   There is ill-defined fluid and edema in the subcutaneous fat circumferentially. There is no organized fluid collection. There is fluid in the intermuscular fascia posteriorly at the level of the calf with some fluid tracking between the medial and lateral gastrocnemius muscle bellies and the gastrocnemius and soleus muscle bellies. There is no abscess formation.   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 There is full-thickness loss of the articular cartilage in the medial compartment with fluid in the joint space. There is mild subchondral edema across the joint which is more likely degenerative. There is minimal subcortical degenerative signal also noted across the lateral compartment. There is no overt bone destruction.   The medial meniscus is abnormal. The posterior horn demonstrates some diffuse contour irregularity with complex signal at the inner 3rd and middle 3rd of the meniscus. Linear signal extends to the superior and inferior articular surfaces. There is some blunting of the free edge. The body of the meniscus is displaced peripherally. There is some blunting of the free edge. There is curvilinear signal contacting the inferior articular surface which could be artifactual. The anterior horn also demonstrates oblique linear signal extending to the inferior articular surface which is more likely artifactual. There is some irregularity at the free edge.   The lateral meniscus demonstrates some amorphous signal in the posterior horn. There is faint linear signal abutting the inferior articular surface at the inner 3rd of the posterior horn. There is no truncation  of the meniscus.   The anterior cruciate ligament demonstrates amorphous intermediate intrasubstance signal throughout. There is no full-thickness discontinuity. There is intermediate intrasubstance signal in the proximal 2/3 of the posterior cruciate ligament as well. There is no focal or full-thickness discontinuity. There is some intermediate intrasubstance signal in the proximal fibular collateral ligament. There is no focal or full-thickness discontinuity. The iliotibial band and biceps femoris tendon are intact. The popliteus tendon is intact. There is mild edema in the popliteus muscle. The medial collateral ligament, extensor complex, and patellar retinacula are intact.       Moderate-sized joint effusion with some complex signal in the suprapatellar bursa. There is nonspecific joint fluid and possibly coexisting blood product. 2 round low signal foci are identified as well measuring 11 and 5 mm in diameter. These findings could be related to calcified loose bodies or air. There is some ill-defined fluid and edema in Hoffa's fat. The patient gives history of septic arthritis with surgical irrigation of the joint.   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 There is superimposed complex tearing of the posterior horn of the medial meniscus. There is minor subchondral bone marrow signal across the joint which is more likely degenerative in nature. There is no overt bone marrow edema or bone destruction to suggest osteomyelitis.   Small linear tear of the posterior horn of the lateral meniscus to the inferior articular surface.   Ill-defined fluid and edema in the subcutaneous fat circumferentially is nonspecific. There is no abscess. There is also nonspecific fluid extending in the intermuscular fat planes of the proximal calf.   Intermediate intrasubstance signal within the cruciate ligaments and proximal fibular collateral ligament could be related to mucoid degeneration or  previous surgical intervention. There is no full-thickness disruption of the structures.   The study is limited by motion degradation.   Signed by: Dhaval Vazquez 1/24/2024 10:48 AM Dictation workstation:   [id number]    MR shoulder left w and wo IV contrast    Result Date: 1/23/2024  Interpreted By:  Dhaval Vazquez, STUDY: MR SHOULDER LEFT W AND WO IV CONTRAST;  1/23/2024 1:26 pm   INDICATION: Signs/Symptoms:possible sc joint infection. History of septic arthritis of left knee. Pain in left sternoclavicular joint.   COMPARISON: None.   ACCESSION NUMBER(S): [id number]   ORDERING CLINICIAN: RALEIGH SALINAS   TECHNIQUE: Multiplanar and multisequential MR images of the left sternoclavicular joint are performed. The study is supplemented with 3 plane post gadolinium fat saturated T1 weighted sequences. 15 cc of intravenous Dotarem is utilized.   FINDINGS: There is fluid signal intensity within the left 1st costochondral joint. There is minimal bone marrow edema at the adjacent articular surfaces. There is no overt bone destruction. There is ill-defined surrounding edema and enhancement extending into the left pectoralis muscles. Soft tissue edema extends along the chest wall between the 1st and 2nd and to a lesser extent the 2nd and 3rd rib. There is some edema extending deep to the joint within the superior mediastinal fat. No fluid collection is identified at this site.   There is minimal subarticular edema across the left 1st sternoclavicular joint. There is no fluid accumulation in the joint or widening of the joint space. There is no bone erosion. There is mild surrounding soft tissue edema.   There is mild edema within the posterior 2nd rib best appreciated on sagittal images.   There is trace fluid along the posterior pleural surface of the upper thorax.       Focal fluid signal within the left 1st costochondral joint. There is surrounding soft tissue edema and enhancement with some ill-defined fluid.  There is no soft tissue abscess. There is no overt bone destruction. These findings are suspicious for septic arthritis.   Minimal edema across the left sternoclavicular joint. There is no fluid in the joint space or widening of the joint. There is mild surrounding soft tissue edema.   Bone marrow edema in the posterior left rib could be reactive is of uncertain significance. There is no bone destruction or fracture.   Small volume left pleural effusion.   Signed by: Dhaval Vazquez 1/23/2024 3:43 PM Dictation workstation:   [id number]    XR shoulder left 2+ views    Result Date: 1/22/2024  Interpreted By:  Cali Smalls, STUDY: XR SHOULDER LEFT 2+ VIEWS; ;  1/22/2024 4:42 pm   INDICATION: Signs/Symptoms:Left shoulder tenderness with palation and reproducible with movement.   COMPARISON: None.   ACCESSION NUMBER(S): [id number]   ORDERING CLINICIAN: JALEN ABRAHAM   FINDINGS: No acute fracture or dislocation. No significant soft tissue swelling. Mild acromioclavicular joint osteoarthritis.       No acute osseous abnormality.   Signed by: Cali Smalls 1/22/2024 5:01 PM Dictation workstation:   [id number]    XR knee 4+ views bilateral    Result Date: 1/15/2024  Interpreted By:  German Hughes III, STUDY: XR KNEE 4+ VIEWS BILATERAL; ;  1/15/2024 2:58 pm   INDICATION: Signs/Symptoms:pain.   COMPARISON: None.   ACCESSION NUMBER(S): [id number]   ORDERING CLINICIAN: GERMAN HUGHES   FINDINGS: Bilateral weight-bearing views knees: Severe osteophytosis sclerosis joint space narrowing the medial compartment as well as about the patellofemoral space. There is osteophytosis and sclerosis as well as subchondral cystic change consistent with primary arthritis. No acute osseous abnormality no fracture or dislocation appreciated       Severe bilateral knee arthritis     MACRO: None   Signed by: German Hughes III 1/15/2024 5:09 PM Dictation workstation:   [id number]       Procedures  L Inj/Asp on 2/9/2024 7:11  AM  Indications: pain  Details: 18 G needle, anterolateral approach  Procedure, treatment alternatives, risks and benefits explained, specific risks discussed. Consent was given by the patient. Immediately prior to procedure a time out was called to verify the correct patient, procedure, equipment, support staff and site/side marked as required. Patient was prepped and draped in the usual sterile fashion.       Aspiration performed as detailed in the procedure note below.      PROCEDURE NOTE:  Physician: Florentin Marti III, MD     Prior to the procedure, the patient's allergies were reviewed. The procedure site was marked and time out performed. The procedure team checked for proper functioning of devices and supplies to be used for the procedure. The procedure team reviewed the relevant diagnostic tests pertinent to the procedure. The risks, benefits and anticipated outcomes of the procedure, the risks and benefits of the alternatives to the procedure, and the roles and tasks of the personnel to be involved were discussed with the patient, The patient verbalized understanding and consented to the procedure.     Procedure details:  The aspiration site was prepped in the usual sterile manner with a combination of alcohol wipes and betadine sticks.  Ethyl chloride mist spray was used to numb the skin.    Aspiration was performed using an 18-gauge needle from a  superior lateral location, a total of 0 fluid was aspirated.  As we are unsuccessful in getting any aspirate about the superior lateral aspect of the knee I did also attempt the knee and this was also dry.    After the aspiration a bandage was placed over the injection site.   The patient tolerated the procedure well with no adverse effects.   Post-injection instructions were reviewed with the patient and the patient voiced understanding of these instructions.    Assessment:  63-year-old male with history of septic arthritis status post arthroscopic I&D x  2    Treatment plan:  Given patient's worsening we did proceed with repeat aspiration of the knee today in 2 different locations and these were dry.  Will obtain a ESR CRP and CBC for evaluation of inflammatory markers  We will coordinate with infectious disease recommend he be seen within the next week  Discussed activities to avoid  Range of motion as tolerated activities as tolerated using pain as a guide  Discussed with patient that if he fails to improve over the course of the next several weeks may benefit from open synovectomy.  However has aspiration was clearly dry today with 2 different attempts for right now we will continue with IV antibiotic treatment.  All of the patient's questions concerns answered  Patient did not tolerate an aspiration very well this did cause him more pain than expected.  Therefore difficult to truly assess nature of current pain and patient's knee.  Therefore we will continue to monitor   labs objectively.  Will update patient with results.      Interval update:  Lab results reviewed blood cell count within normal limits, trending however still elevated at 12.  Prior CRP was 28, 10 days prior.  Will continue to trend CRPs..

## 2024-02-10 LAB
BACTERIA BLD CULT: NORMAL
BACTERIA BLD CULT: NORMAL

## 2024-02-14 ENCOUNTER — TELEPHONE (OUTPATIENT)
Dept: ORTHOPEDIC SURGERY | Facility: CLINIC | Age: 64
End: 2024-02-14
Payer: COMMERCIAL

## 2024-02-14 NOTE — TELEPHONE ENCOUNTER
Rec'd a VM from Melchor's sister requesting an update on the [facility] paperwork that was dropped off a week or so ago. Please call them at [phone].

## 2024-02-15 ENCOUNTER — OFFICE VISIT (OUTPATIENT)
Dept: INFECTIOUS DISEASES | Age: 64
End: 2024-02-15

## 2024-02-15 VITALS
DIASTOLIC BLOOD PRESSURE: 60 MMHG | HEART RATE: 91 BPM | SYSTOLIC BLOOD PRESSURE: 96 MMHG | RESPIRATION RATE: 16 BRPM | TEMPERATURE: 97 F

## 2024-02-15 DIAGNOSIS — M00.262 STREPTOCOCCAL ARTHRITIS OF LEFT KNEE (HCC): Primary | ICD-10-CM

## 2024-02-15 NOTE — PROGRESS NOTES
Ignacio Graf (:  1960) is a 63 y.o. male,Established patient, here for evaluation of the following chief complaint(s):  Arthritis ([facility] f/u- septic arthritis. )         ASSESSMENT/PLAN:  Left knee septic arthritis following intra-articular steroid injection, improving slowly  Streptococcus mitis infection, improving  Diabetes mellitus type 2 and osteoarthritis    IV Rocephin for 2 more weeks  Follow-up CBC BMP  Follow-up CRP  Optimal glycemic control is emphasized  Follow-up in 2 weeks  I called the infection control nurse to evaluate patient and sisters concern about possible contaminated steroid vial  Subjective   SUBJECTIVE/OBJECTIVE:  HPI  F/U Left knee septic arthritis with Streptococcus mitis infection following intra-articular steroid injection on IV Rocephin, well-tolerated.   Patient persist to have severe left knee pain, persistent swelling.  Difficulty walking.  No fevers or chills.  No nausea vomiting or diarrhea.   No rash  Remains to be at skilled nursing facility  Review of Systems   Rest of review of system system is negative  No specific complaints otherwise  Objective   Physical Exam     Vitals:    02/15/24 1443   BP: 96/60   Pulse: 91   Resp: 16   Temp: 97 °F (36.1 °C)   TempSrc: Temporal     General Appearance: alert and oriented , well-developed and well-nourished, in no acute distress  Skin: warm and dry, no rash.   Head: normocephalic and atraumatic  Eyes: anicteric sclerae  ENT: normal mucous membranes.   Lungs: normal respiratory effort  Abdomen: soft, no tenderness  No leg edema  Left knee with persistent swelling and decreased flexion and extension  Left knee with persistent tenderness  No erythema or warmth      BMP is within normal except for creatinine of 1.24  CBC is within normal except for hemoglobin of 12.1  CRP is elevated at 12.91  More than 50% of the time was spent in counseling  An electronic signature was used to authenticate this note.    --Minerva rBowning MD

## 2024-02-16 ENCOUNTER — LAB (OUTPATIENT)
Dept: LAB | Facility: LAB | Age: 64
End: 2024-02-16
Payer: COMMERCIAL

## 2024-02-16 ENCOUNTER — OFFICE VISIT (OUTPATIENT)
Dept: ORTHOPEDIC SURGERY | Facility: CLINIC | Age: 64
End: 2024-02-16
Payer: COMMERCIAL

## 2024-02-16 DIAGNOSIS — M25.561 ARTHRALGIA OF RIGHT KNEE: ICD-10-CM

## 2024-02-16 DIAGNOSIS — T84.038A ASEPTIC LOOSENING OF PROSTHETIC HIP, INITIAL ENCOUNTER (CMS-HCC): ICD-10-CM

## 2024-02-16 DIAGNOSIS — Z96.659 ASEPTIC LOOSENING OF PROSTHETIC KNEE, INITIAL ENCOUNTER (CMS-HCC): ICD-10-CM

## 2024-02-16 DIAGNOSIS — Z96.649 ASEPTIC LOOSENING OF PROSTHETIC HIP, INITIAL ENCOUNTER (CMS-HCC): ICD-10-CM

## 2024-02-16 DIAGNOSIS — M25.50 ARTHRALGIA, UNSPECIFIED JOINT: ICD-10-CM

## 2024-02-16 DIAGNOSIS — T84.038A ASEPTIC LOOSENING OF PROSTHETIC KNEE, INITIAL ENCOUNTER (CMS-HCC): ICD-10-CM

## 2024-02-16 LAB
BASOPHILS # BLD AUTO: 0.06 X10*3/UL (ref 0–0.1)
BASOPHILS NFR BLD AUTO: 1.3 %
CRP SERPL-MCNC: 15.3 MG/DL
EOSINOPHIL # BLD AUTO: 0.16 X10*3/UL (ref 0–0.7)
EOSINOPHIL NFR BLD AUTO: 3.4 %
ERYTHROCYTE [DISTWIDTH] IN BLOOD BY AUTOMATED COUNT: 13.7 % (ref 11.5–14.5)
ERYTHROCYTE [SEDIMENTATION RATE] IN BLOOD BY WESTERGREN METHOD: 29 MM/H (ref 0–20)
HCT VFR BLD AUTO: 35.6 % (ref 41–52)
HGB BLD-MCNC: 11.4 G/DL (ref 13.5–17.5)
IMM GRANULOCYTES # BLD AUTO: 0.01 X10*3/UL (ref 0–0.7)
IMM GRANULOCYTES NFR BLD AUTO: 0.2 % (ref 0–0.9)
LYMPHOCYTES # BLD AUTO: 1.1 X10*3/UL (ref 1.2–4.8)
LYMPHOCYTES NFR BLD AUTO: 23.7 %
MCH RBC QN AUTO: 27.4 PG (ref 26–34)
MCHC RBC AUTO-ENTMCNC: 32 G/DL (ref 32–36)
MCV RBC AUTO: 86 FL (ref 80–100)
MONOCYTES # BLD AUTO: 0.37 X10*3/UL (ref 0.1–1)
MONOCYTES NFR BLD AUTO: 8 %
NEUTROPHILS # BLD AUTO: 2.95 X10*3/UL (ref 1.2–7.7)
NEUTROPHILS NFR BLD AUTO: 63.4 %
NRBC BLD-RTO: 0 /100 WBCS (ref 0–0)
PLATELET # BLD AUTO: 327 X10*3/UL (ref 150–450)
RBC # BLD AUTO: 4.16 X10*6/UL (ref 4.5–5.9)
WBC # BLD AUTO: 4.7 X10*3/UL (ref 4.4–11.3)

## 2024-02-16 PROCEDURE — 86140 C-REACTIVE PROTEIN: CPT

## 2024-02-16 PROCEDURE — 4010F ACE/ARB THERAPY RXD/TAKEN: CPT | Performed by: STUDENT IN AN ORGANIZED HEALTH CARE EDUCATION/TRAINING PROGRAM

## 2024-02-16 PROCEDURE — 1036F TOBACCO NON-USER: CPT | Performed by: STUDENT IN AN ORGANIZED HEALTH CARE EDUCATION/TRAINING PROGRAM

## 2024-02-16 PROCEDURE — 85025 COMPLETE CBC W/AUTO DIFF WBC: CPT

## 2024-02-16 PROCEDURE — 99024 POSTOP FOLLOW-UP VISIT: CPT | Performed by: STUDENT IN AN ORGANIZED HEALTH CARE EDUCATION/TRAINING PROGRAM

## 2024-02-16 PROCEDURE — 85652 RBC SED RATE AUTOMATED: CPT

## 2024-02-16 NOTE — PROGRESS NOTES
Chief Complaint   Patient presents with    Right Knee - Follow-up     S/P I and D 01/25/24 Casi ALAMO  Patient presents today for follow up of right knee.  Patient states that his pain is mildly Improving.  Has been working with physical therapy at current skilled nursing facility.      Physical exam  General: Alert and oriented to place, person, and time.  No acute distress and breathing comfortably; pleasant and cooperative with the examination.  Extremity:  Focused examination right knee: Mild to moderate edema about the knee no surrounding erythema prior portal sites healing well no active drainage no appreciable erythema some pain with range of motion of the knee stable ligamentous examination.    Diagnostics:  Lower extremity venous duplex left    Result Date: 2/6/2024  Interpreted By:  Lisa Peguero, STUDY: [facility] LOWER EXTREMITY VENOUS DUPLEX LEFT  2/6/2024 4:02 pm   INDICATION: 64 y/o   M with  Signs/Symptoms:pain swelling. LMP:  Unknown.   COMPARISON: 02/18/2021 left lower extremity   ACCESSION NUMBER(S): [id number]   ORDERING CLINICIAN: IJEOMA LACY   TECHNIQUE: Routine ultrasound of the  left lower extremity was performed with duplex Doppler (color and spectral) evaluation.   Static images were obtained for remote interpretation.   FINDINGS: THIGH VEINS:  The common femoral, femoral, popliteal, proximal medial saphenous, and deep femoral veins are patent and free of thrombus. The veins are normally compressible.  They demonstrate normal phasic flow and augmentation response.   CALF VEINS:  The paired peroneal and posterior tibial calf veins are patent.       Negative study.  No deep venous thrombosis of the  left lower extremity.   MACRO: None   Signed by: Lisa Peguero 2/6/2024 4:17 PM Dictation workstation:   [id number]    XR knee left 3 views    Result Date: 2/6/2024  Interpreted By:  Kevin Carrillo, STUDY: XR KNEE LEFT 3 VIEWS;  2/6/2024 3:02 pm   INDICATION:  Signs/Symptoms:pain swelling s/p surgery.   COMPARISON: Previous exam from 01/15/2024   ACCESSION NUMBER(S): [id number]   ORDERING CLINICIAN: IJEOMA LACY   TECHNIQUE: 3 views  of the  knee were obtained.   FINDINGS: Moderate medial compartment joint space loss with spur formation. Mild lateral compartment spur formation. Sharpening of the medial tibial spine. Moderate patellofemoral joint space loss with spur formation. Moderate fullness in the suprapatellar bursa. Patellar osteophyte at the insertion of the quadriceps tendon. Mild anterior left knee soft tissue swelling. No lytic or blastic destructive bone lesion. No acute fracture or dislocation. No opaque soft tissue foreign body. No periosteal reaction or erosion.       Moderate left knee DJD as described.   Mild anterior soft tissue swelling. Moderate suprapatellar effusion.   No destructive bone lesion. No acute fracture or dislocation.   MACRO: None   Signed by: Kevin Carrillo 2/6/2024 3:11 PM Dictation workstation:   [id number]      Procedures  Procedures     Assessment:  63-year-old male status post irrigation debridement 1/25/2024 for native septic knee    Treatment plan:  Will continue to trend ESR CRP and CBC  Discussed case with Dr. Eva Talley infectious disease.  Will continue to monitor inflammatory markers.  She agrees with plan below.  If patient fails to improve with downtrending ESR CRP will consider open synovectomy  IV antibiotics per infectious disease  Discussed activities to avoid  Follow-up in 2 weeks time  All of the patient's questions concerns answered

## 2024-02-19 ENCOUNTER — TELEPHONE VISIT (OUTPATIENT)
Dept: ORTHOPEDIC SURGERY | Facility: CLINIC | Age: 64
End: 2024-02-19
Payer: COMMERCIAL

## 2024-02-19 DIAGNOSIS — M25.561 ARTHRALGIA OF RIGHT KNEE: Primary | ICD-10-CM

## 2024-02-19 NOTE — PROGRESS NOTES
Lab result phone call:    Discussed findings of CRP and CBC with patient.    Will continue to monitor inflammatory markers.  Patient does continue to have some pain about his knee therefore will obtain repeat labs this Friday.  If we do not continue to see downtrend will consider open synovectomy.  Will discuss this with infectious disease as well as the utility of this if pain is not improving and inflammatory labs are not improving.  Will continue to monitor closely.

## 2024-02-22 ENCOUNTER — TELEPHONE (OUTPATIENT)
Dept: PHARMACY | Facility: HOSPITAL | Age: 64
End: 2024-02-22
Payer: COMMERCIAL

## 2024-02-22 NOTE — TELEPHONE ENCOUNTER
Spoke with patient today - patient reports knee not getting better, and still residing in rehab facility. Patient unsure if he wants to continue with antibiotics, or undergo surgery again. Patient to discuss with Dr. Marti later this week for next steps.    Reports blood glucose at goal (sometimes low), and has not been taking Rybelsus. States he has lost ~20 pounds in 5 weeks.

## 2024-03-06 ENCOUNTER — APPOINTMENT (OUTPATIENT)
Dept: ORTHOPEDIC SURGERY | Facility: CLINIC | Age: 64
End: 2024-03-06
Payer: COMMERCIAL

## 2024-03-12 DIAGNOSIS — E11.29 TYPE 2 DIABETES MELLITUS WITH MICROALBUMINURIA, WITHOUT LONG-TERM CURRENT USE OF INSULIN (MULTI): ICD-10-CM

## 2024-03-12 DIAGNOSIS — R80.9 TYPE 2 DIABETES MELLITUS WITH MICROALBUMINURIA, WITHOUT LONG-TERM CURRENT USE OF INSULIN (MULTI): ICD-10-CM

## 2024-03-12 NOTE — TELEPHONE ENCOUNTER
Dr Britt pt needs refill  Rybelsus 3mg, 1 tab daily  [facility] on [address]  Contact # for the home is [phone]

## 2024-03-18 ENCOUNTER — LAB (OUTPATIENT)
Dept: LAB | Facility: LAB | Age: 64
End: 2024-03-18
Payer: COMMERCIAL

## 2024-03-18 DIAGNOSIS — I10 PRIMARY HYPERTENSION: ICD-10-CM

## 2024-03-18 DIAGNOSIS — Z12.5 SPECIAL SCREENING EXAMINATION FOR NEOPLASM OF PROSTATE: ICD-10-CM

## 2024-03-18 DIAGNOSIS — E11.29 TYPE 2 DIABETES MELLITUS WITH MICROALBUMINURIA, WITHOUT LONG-TERM CURRENT USE OF INSULIN (MULTI): ICD-10-CM

## 2024-03-18 DIAGNOSIS — E78.2 MIXED HYPERLIPIDEMIA: ICD-10-CM

## 2024-03-18 DIAGNOSIS — K21.9 GASTROESOPHAGEAL REFLUX DISEASE, UNSPECIFIED WHETHER ESOPHAGITIS PRESENT: ICD-10-CM

## 2024-03-18 DIAGNOSIS — R80.9 TYPE 2 DIABETES MELLITUS WITH MICROALBUMINURIA, WITHOUT LONG-TERM CURRENT USE OF INSULIN (MULTI): ICD-10-CM

## 2024-03-18 LAB
ALBUMIN SERPL BCP-MCNC: 3.6 G/DL (ref 3.4–5)
ALP SERPL-CCNC: 104 U/L (ref 33–136)
ALT SERPL W P-5'-P-CCNC: 9 U/L (ref 10–52)
ANION GAP SERPL CALC-SCNC: 14 MMOL/L (ref 10–20)
AST SERPL W P-5'-P-CCNC: 12 U/L (ref 9–39)
BASOPHILS # BLD AUTO: 0.04 X10*3/UL (ref 0–0.1)
BASOPHILS NFR BLD AUTO: 0.9 %
BILIRUB SERPL-MCNC: 0.3 MG/DL (ref 0–1.2)
BUN SERPL-MCNC: 32 MG/DL (ref 6–23)
CALCIUM SERPL-MCNC: 9.2 MG/DL (ref 8.6–10.3)
CHLORIDE SERPL-SCNC: 110 MMOL/L (ref 98–107)
CHOLEST SERPL-MCNC: 166 MG/DL (ref 0–199)
CHOLESTEROL/HDL RATIO: 3.7
CO2 SERPL-SCNC: 20 MMOL/L (ref 21–32)
CREAT SERPL-MCNC: 1 MG/DL (ref 0.5–1.3)
CREAT UR-MCNC: 110.8 MG/DL (ref 20–370)
EGFRCR SERPLBLD CKD-EPI 2021: 85 ML/MIN/1.73M*2
EOSINOPHIL # BLD AUTO: 0.12 X10*3/UL (ref 0–0.7)
EOSINOPHIL NFR BLD AUTO: 2.6 %
ERYTHROCYTE [DISTWIDTH] IN BLOOD BY AUTOMATED COUNT: 16.6 % (ref 11.5–14.5)
EST. AVERAGE GLUCOSE BLD GHB EST-MCNC: 148 MG/DL
GLUCOSE SERPL-MCNC: 85 MG/DL (ref 74–99)
HBA1C MFR BLD: 6.8 %
HCT VFR BLD AUTO: 33.6 % (ref 41–52)
HDLC SERPL-MCNC: 44.9 MG/DL
HGB BLD-MCNC: 10.2 G/DL (ref 13.5–17.5)
IMM GRANULOCYTES # BLD AUTO: 0.02 X10*3/UL (ref 0–0.7)
IMM GRANULOCYTES NFR BLD AUTO: 0.4 % (ref 0–0.9)
LDLC SERPL CALC-MCNC: 90 MG/DL
LYMPHOCYTES # BLD AUTO: 1.02 X10*3/UL (ref 1.2–4.8)
LYMPHOCYTES NFR BLD AUTO: 22.5 %
MAGNESIUM SERPL-MCNC: 1.63 MG/DL (ref 1.6–2.4)
MCH RBC QN AUTO: 26.6 PG (ref 26–34)
MCHC RBC AUTO-ENTMCNC: 30.4 G/DL (ref 32–36)
MCV RBC AUTO: 88 FL (ref 80–100)
MICROALBUMIN UR-MCNC: 103.1 MG/L
MICROALBUMIN/CREAT UR: 93.1 UG/MG CREAT
MONOCYTES # BLD AUTO: 0.31 X10*3/UL (ref 0.1–1)
MONOCYTES NFR BLD AUTO: 6.8 %
NEUTROPHILS # BLD AUTO: 3.03 X10*3/UL (ref 1.2–7.7)
NEUTROPHILS NFR BLD AUTO: 66.8 %
NON HDL CHOLESTEROL: 121 MG/DL (ref 0–149)
NRBC BLD-RTO: 0 /100 WBCS (ref 0–0)
PLATELET # BLD AUTO: 345 X10*3/UL (ref 150–450)
POTASSIUM SERPL-SCNC: 4.8 MMOL/L (ref 3.5–5.3)
PROT SERPL-MCNC: 6.8 G/DL (ref 6.4–8.2)
PSA SERPL-MCNC: 0.58 NG/ML
RBC # BLD AUTO: 3.83 X10*6/UL (ref 4.5–5.9)
SODIUM SERPL-SCNC: 139 MMOL/L (ref 136–145)
TRIGL SERPL-MCNC: 154 MG/DL (ref 0–149)
TSH SERPL-ACNC: 3.42 MIU/L (ref 0.44–3.98)
VLDL: 31 MG/DL (ref 0–40)
WBC # BLD AUTO: 4.5 X10*3/UL (ref 4.4–11.3)

## 2024-03-18 PROCEDURE — 83735 ASSAY OF MAGNESIUM: CPT

## 2024-03-18 PROCEDURE — 82570 ASSAY OF URINE CREATININE: CPT

## 2024-03-18 PROCEDURE — 80053 COMPREHEN METABOLIC PANEL: CPT

## 2024-03-18 PROCEDURE — 83036 HEMOGLOBIN GLYCOSYLATED A1C: CPT

## 2024-03-18 PROCEDURE — 82043 UR ALBUMIN QUANTITATIVE: CPT

## 2024-03-18 PROCEDURE — 80061 LIPID PANEL: CPT

## 2024-03-18 PROCEDURE — 84443 ASSAY THYROID STIM HORMONE: CPT

## 2024-03-18 PROCEDURE — 85025 COMPLETE CBC W/AUTO DIFF WBC: CPT

## 2024-03-18 PROCEDURE — 84153 ASSAY OF PSA TOTAL: CPT

## 2024-03-19 ENCOUNTER — OFFICE VISIT (OUTPATIENT)
Dept: PRIMARY CARE | Facility: CLINIC | Age: 64
End: 2024-03-19
Payer: COMMERCIAL

## 2024-03-19 VITALS
DIASTOLIC BLOOD PRESSURE: 72 MMHG | RESPIRATION RATE: 16 BRPM | SYSTOLIC BLOOD PRESSURE: 112 MMHG | WEIGHT: 147 LBS | HEART RATE: 92 BPM | TEMPERATURE: 97.2 F | OXYGEN SATURATION: 98 % | HEIGHT: 69 IN | BODY MASS INDEX: 21.77 KG/M2

## 2024-03-19 DIAGNOSIS — E11.29 TYPE 2 DIABETES MELLITUS WITH MICROALBUMINURIA, WITHOUT LONG-TERM CURRENT USE OF INSULIN (MULTI): Primary | ICD-10-CM

## 2024-03-19 DIAGNOSIS — E78.2 MIXED HYPERLIPIDEMIA: ICD-10-CM

## 2024-03-19 DIAGNOSIS — D50.9 IRON DEFICIENCY ANEMIA, UNSPECIFIED IRON DEFICIENCY ANEMIA TYPE: ICD-10-CM

## 2024-03-19 DIAGNOSIS — Z11.59 ENCOUNTER FOR HEPATITIS C SCREENING TEST FOR LOW RISK PATIENT: ICD-10-CM

## 2024-03-19 DIAGNOSIS — I10 PRIMARY HYPERTENSION: ICD-10-CM

## 2024-03-19 DIAGNOSIS — R80.9 TYPE 2 DIABETES MELLITUS WITH MICROALBUMINURIA, WITHOUT LONG-TERM CURRENT USE OF INSULIN (MULTI): Primary | ICD-10-CM

## 2024-03-19 DIAGNOSIS — M00.862 ARTHRITIS DUE TO OTHER BACTERIA, LEFT KNEE (MULTI): ICD-10-CM

## 2024-03-19 PROCEDURE — 3044F HG A1C LEVEL LT 7.0%: CPT | Performed by: FAMILY MEDICINE

## 2024-03-19 PROCEDURE — 99214 OFFICE O/P EST MOD 30 MIN: CPT | Performed by: FAMILY MEDICINE

## 2024-03-19 PROCEDURE — 3078F DIAST BP <80 MM HG: CPT | Performed by: FAMILY MEDICINE

## 2024-03-19 PROCEDURE — 4010F ACE/ARB THERAPY RXD/TAKEN: CPT | Performed by: FAMILY MEDICINE

## 2024-03-19 PROCEDURE — 3048F LDL-C <100 MG/DL: CPT | Performed by: FAMILY MEDICINE

## 2024-03-19 PROCEDURE — 3074F SYST BP LT 130 MM HG: CPT | Performed by: FAMILY MEDICINE

## 2024-03-19 PROCEDURE — 3060F POS MICROALBUMINURIA REV: CPT | Performed by: FAMILY MEDICINE

## 2024-03-19 PROCEDURE — 1036F TOBACCO NON-USER: CPT | Performed by: FAMILY MEDICINE

## 2024-03-19 RX ORDER — ROSUVASTATIN CALCIUM 20 MG/1
20 TABLET, COATED ORAL DAILY
Qty: 90 TABLET | Refills: 3 | Status: SHIPPED | OUTPATIENT
Start: 2024-03-19 | End: 2024-05-01 | Stop reason: SDUPTHER

## 2024-03-19 ASSESSMENT — ENCOUNTER SYMPTOMS
VOMITING: 0
HEADACHES: 0
NERVOUS/ANXIOUS: 0
NUMBNESS: 0
WHEEZING: 0
NAUSEA: 0
TREMORS: 0
AGITATION: 0
EYE REDNESS: 0
RHINORRHEA: 0
EYE ITCHING: 0
ABDOMINAL PAIN: 0
BACK PAIN: 0
FLANK PAIN: 0
POLYDIPSIA: 0
WOUND: 0
PALPITATIONS: 0
NECK PAIN: 0
PHOTOPHOBIA: 0
FREQUENCY: 0
DIAPHORESIS: 0
APPETITE CHANGE: 0
HYPERTENSION: 1
FACIAL ASYMMETRY: 0
BRUISES/BLEEDS EASILY: 0
COUGH: 0
CONSTIPATION: 0
WEAKNESS: 0
SPEECH DIFFICULTY: 0
TROUBLE SWALLOWING: 0
DYSPHORIC MOOD: 0
CHEST TIGHTNESS: 0
EYE PAIN: 0
SHORTNESS OF BREATH: 0
VOICE CHANGE: 0
NECK STIFFNESS: 0
ADENOPATHY: 0
SINUS PRESSURE: 0
EYE DISCHARGE: 0
ACTIVITY CHANGE: 0
FATIGUE: 1
DYSURIA: 0
JOINT SWELLING: 0
MYALGIAS: 0
UNEXPECTED WEIGHT CHANGE: 0
HALLUCINATIONS: 0
SORE THROAT: 0
HEMATURIA: 0
BLOOD IN STOOL: 0
CONFUSION: 0
DIARRHEA: 0
ARTHRALGIAS: 1
CHOKING: 0
SLEEP DISTURBANCE: 0
ABDOMINAL DISTENTION: 0
FEVER: 0
LIGHT-HEADEDNESS: 0
CHILLS: 0
SEIZURES: 0
DIZZINESS: 0

## 2024-03-19 NOTE — PROGRESS NOTES
Subjective   Patient ID: Melchor Bergeron is a 64 y.o. male who presents for 3 month check up (And review labs /PT. IS CURRENTLY AT [facility] ) and Hospital Follow-up (DX: Arthritis due to other bacteria, left knee/Also left knee replacement left knee).        Diabetes  He presents for his follow-up diabetic visit. He has type 2 diabetes mellitus. His disease course has been worsening. Pertinent negatives for hypoglycemia include no confusion, dizziness, headaches, nervousness/anxiousness, seizures, speech difficulty or tremors. Associated symptoms include fatigue. Pertinent negatives for diabetes include no chest pain, no foot paresthesias, no polydipsia, no polyuria and no weakness. Symptoms are stable. Risk factors for coronary artery disease include diabetes mellitus, dyslipidemia, hypertension and male sex. Current diabetic treatment includes oral agent (triple therapy). He is compliant with treatment none of the time. His weight is stable. He is following a generally healthy diet. When asked about meal planning, he reported none. He has not had a previous visit with a dietitian. He participates in exercise intermittently. His home blood glucose trend is increasing rapidly. An ACE inhibitor/angiotensin II receptor blocker is being taken.   Hypertension  This is a chronic problem. The current episode started more than 1 year ago. The problem is unchanged. The problem is controlled. Associated symptoms include malaise/fatigue. Pertinent negatives include no chest pain, headaches, neck pain, palpitations or shortness of breath. There are no associated agents to hypertension. Risk factors for coronary artery disease include diabetes mellitus, dyslipidemia and male gender. Past treatments include ACE inhibitors. The current treatment provides significant improvement. There is no history of a hypertension causing med or a thyroid problem.   Hyperlipidemia  This is a chronic problem. The current  episode started more than 1 year ago. The problem is controlled. Recent lipid tests were reviewed and are variable. Exacerbating diseases include diabetes. Pertinent negatives include no chest pain, myalgias or shortness of breath. Current antihyperlipidemic treatment includes statins. The current treatment provides significant improvement of lipids. There are no compliance problems.         Review of Systems   Constitutional:  Positive for fatigue and malaise/fatigue. Negative for activity change, appetite change, chills, diaphoresis, fever and unexpected weight change.   HENT:  Negative for congestion, ear pain, hearing loss, nosebleeds, postnasal drip, rhinorrhea, sinus pressure, sneezing, sore throat, tinnitus, trouble swallowing and voice change.    Eyes:  Negative for photophobia, pain, discharge, redness, itching and visual disturbance.   Respiratory:  Negative for cough, choking, chest tightness, shortness of breath and wheezing.    Cardiovascular:  Negative for chest pain, palpitations and leg swelling.   Gastrointestinal:  Negative for abdominal distention, abdominal pain, blood in stool, constipation, diarrhea, nausea and vomiting.   Endocrine: Negative for cold intolerance, heat intolerance, polydipsia and polyuria.   Genitourinary:  Negative for dysuria, flank pain, frequency, hematuria and urgency.   Musculoskeletal:  Positive for arthralgias. Negative for back pain, joint swelling, myalgias, neck pain and neck stiffness.   Skin:  Negative for rash and wound.   Allergic/Immunologic: Negative for immunocompromised state.   Neurological:  Negative for dizziness, tremors, seizures, syncope, facial asymmetry, speech difficulty, weakness, light-headedness, numbness and headaches.   Hematological:  Negative for adenopathy. Does not bruise/bleed easily.   Psychiatric/Behavioral:  Negative for agitation, behavioral problems, confusion, dysphoric mood, hallucinations, self-injury, sleep disturbance and suicidal  "ideas. The patient is not nervous/anxious.        Objective   /72 (BP Location: Left arm, Patient Position: Sitting, BP Cuff Size: Adult)   Pulse 92   Temp 36.2 °C (97.2 °F) (Temporal)   Resp 16   Ht 1.753 m (5' 9\")   Wt 66.7 kg (147 lb)   SpO2 98%   BMI 21.71 kg/m²     Physical Exam  Constitutional:       General: He is not in acute distress.     Appearance: He is not ill-appearing or diaphoretic.   HENT:      Head: Normocephalic and atraumatic.      Right Ear: External ear normal.      Left Ear: External ear normal.      Nose: Nose normal. No rhinorrhea.   Eyes:      General: Lids are normal. No scleral icterus.        Right eye: No discharge.         Left eye: No discharge.      Conjunctiva/sclera: Conjunctivae normal.   Cardiovascular:      Rate and Rhythm: Normal rate and regular rhythm.      Pulses: Normal pulses.      Heart sounds: No murmur heard.  Pulmonary:      Effort: Pulmonary effort is normal. No respiratory distress.      Breath sounds: No decreased breath sounds, wheezing, rhonchi or rales.   Abdominal:      General: Bowel sounds are normal. There is no distension.      Palpations: Abdomen is soft. There is no mass.      Tenderness: There is no abdominal tenderness. There is no guarding or rebound.   Musculoskeletal:         General: No swelling or tenderness.      Cervical back: No rigidity or tenderness.      Right lower leg: No edema.      Left lower leg: No edema.      Comments: Diffuse arthritic deformities noted   Lymphadenopathy:      Cervical: No cervical adenopathy.      Upper Body:      Right upper body: No supraclavicular adenopathy.      Left upper body: No supraclavicular adenopathy.   Skin:     General: Skin is warm and dry.      Coloration: Skin is not jaundiced or pale.      Findings: No erythema, lesion or rash.   Neurological:      General: No focal deficit present.      Mental Status: He is alert and oriented to person, place, and time.      Sensory: No sensory " deficit.      Motor: No weakness or tremor.      Coordination: Coordination normal.      Gait: Gait normal.   Psychiatric:         Mood and Affect: Mood normal. Affect is not inappropriate.         Behavior: Behavior normal.         Assessment/Plan   Diagnoses and all orders for this visit:  Type 2 diabetes mellitus with microalbuminuria, without long-term current use of insulin (CMS/[facility])  -     Follow Up In [facility] PCP [facility]  -     Albumin , Urine Random; Future  -     CBC and Auto Differential; Future  -     Comprehensive Metabolic Panel; Future  -     Hemoglobin A1C; Future  -     Lipid Panel; Future  -     Magnesium; Future  -     TSH with reflex to Free T4 if abnormal; Future  -     Follow Up In [facility]; Future  -     rosuvastatin (Crestor) 20 mg tablet; Take 1 tablet (20 mg) by mouth once daily.  Primary hypertension  -     Follow Up In [facility]  -     Albumin , Urine Random; Future  -     CBC and Auto Differential; Future  -     Comprehensive Metabolic Panel; Future  -     Lipid Panel; Future  -     Magnesium; Future  -     TSH with reflex to Free T4 if abnormal; Future  -     Follow Up In [facility]; Future  Mixed hyperlipidemia  -     Follow Up In [facility]  -     Comprehensive Metabolic Panel; Future  -     Lipid Panel; Future  -     TSH with reflex to Free T4 if abnormal; Future  -     Follow Up In [facility]; Future  -     rosuvastatin (Crestor) 20 mg tablet; Take 1 tablet (20 mg) by mouth once daily.  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

## 2024-03-29 ENCOUNTER — APPOINTMENT (OUTPATIENT)
Dept: ORTHOPEDIC SURGERY | Facility: CLINIC | Age: 64
End: 2024-03-29
Payer: COMMERCIAL

## 2024-04-15 ENCOUNTER — PATIENT OUTREACH (OUTPATIENT)
Dept: PRIMARY CARE | Facility: CLINIC | Age: 64
End: 2024-04-15
Payer: COMMERCIAL

## 2024-04-15 ENCOUNTER — DOCUMENTATION (OUTPATIENT)
Dept: PRIMARY CARE | Facility: CLINIC | Age: 64
End: 2024-04-15
Payer: COMMERCIAL

## 2024-04-15 NOTE — PROGRESS NOTES
Discharge Facility:  [facility]  Discharge Diagnosis:  Encounter for other orthopedic aftercare  Admission Date:  3/5/24   Discharge Date:  4/14/24     PCP Appointment Date:  5/1/24   Specialist Appointment Date:  5/2/24 ortho   Hospital Encounter and Summary: Linked   See discharge assessment below for further details      Engagement  Call Start Time: 1350 (4/15/2024  1:51 PM)    Medications  Medications reviewed with patient/caregiver?: Yes (4/15/2024  1:51 PM)  Is the patient having any side effects they believe may be caused by any medication additions or changes?: No (4/15/2024  1:51 PM)  Does the patient have all medications ordered at discharge?: No (pt states pharm will get him his meds tomorrow.) (4/15/2024  1:51 PM)  Prescription Comments: script for doxy and amox (4/15/2024  1:51 PM)  Is the patient taking all medications as directed (includes completed medication regime)?: No (4/15/2024  1:51 PM)  Medication Comments: cm to contact pharm for how pt can get medicine (4/15/2024  1:51 PM)    Appointments  Does the patient have a primary care provider?: Yes (4/15/2024  1:51 PM)    Self Management  What is the home health agency?: pt [employer] of [employer] (4/15/2024  1:51 PM)  Has home health visited the patient within 72 hours of discharge?: Yes (4/15/2024  1:51 PM)    Patient Teaching  Does the patient have access to their discharge instructions?: Yes (4/15/2024  1:51 PM)  Care Management Interventions: Reviewed instructions with patient (4/15/2024  1:51 PM)  What is the patient's perception of their health status since discharge?: Improving (4/15/2024  1:51 PM)  Is the patient/caregiver able to teach back the hierarchy of who to call/visit for symptoms/problems? PCP, Specialist, Home Health nurse, Urgent Care, ED, 911: Yes (4/15/2024  1:51 PM)  Patient/Caregiver Education Comments: Successful transition of care outreach with patient. Pt reports doing well at home since discharge. New meds  reviewed. Pt denies CP and SOB. Patient denies any further discharge questions/needs at this time. Emphasized that Follow up is needed after discharge to review the hospital recommendations, assess your response to your treatment.  Pt aware of my availability for non-emergent concerns. Contact info provided to patient  . (4/15/2024  1:51 PM)      'Ofelia- apolinar [facility] [address] [facility] who will call [facility] [employer] [facility] and get meds transferred so pt can have abx  today. Pt aware.

## 2024-04-24 ENCOUNTER — TELEPHONE (OUTPATIENT)
Dept: PHARMACY | Facility: HOSPITAL | Age: 64
End: 2024-04-24
Payer: COMMERCIAL

## 2024-04-24 DIAGNOSIS — E11.29 TYPE 2 DIABETES MELLITUS WITH MICROALBUMINURIA, WITHOUT LONG-TERM CURRENT USE OF INSULIN (MULTI): Primary | ICD-10-CM

## 2024-04-24 DIAGNOSIS — K21.9 GASTROESOPHAGEAL REFLUX DISEASE, UNSPECIFIED WHETHER ESOPHAGITIS PRESENT: ICD-10-CM

## 2024-04-24 DIAGNOSIS — R80.9 TYPE 2 DIABETES MELLITUS WITH MICROALBUMINURIA, WITHOUT LONG-TERM CURRENT USE OF INSULIN (MULTI): Primary | ICD-10-CM

## 2024-04-24 DIAGNOSIS — E11.29 TYPE 2 DIABETES MELLITUS WITH MICROALBUMINURIA, WITHOUT LONG-TERM CURRENT USE OF INSULIN (MULTI): ICD-10-CM

## 2024-04-24 DIAGNOSIS — I10 PRIMARY HYPERTENSION: ICD-10-CM

## 2024-04-24 DIAGNOSIS — R80.9 TYPE 2 DIABETES MELLITUS WITH MICROALBUMINURIA, WITHOUT LONG-TERM CURRENT USE OF INSULIN (MULTI): ICD-10-CM

## 2024-04-24 NOTE — TELEPHONE ENCOUNTER
Received voicemail from patient requesting phone call back.    Spoke with patient today - patient reports continual issue with knee/still recovering. Patient is receiving PT multiple times per week. Has been discharged from rehab facility.    Patient's main concerns are:  - Insurance/[employer]  - Recent termination from job due to need for long term disability  - Medications    Applauded patient for most recent blood work results. Patient has lost nearly 40 pounds and reports eating well. Advised patient may be eligible for patient assistance programs in the future, if he can show loss of employment/decrease in monthly income. Patient will be able to get one more refill on medications before Cobra will kick in. Also, advised of samples in office, if needed.    Will plan to follow-up in one month.    Thank you,  Jo-Ann Silva, PharmD

## 2024-04-25 PROBLEM — R53.1 WEAKNESS: Status: ACTIVE | Noted: 2024-02-01

## 2024-04-25 PROBLEM — T84.038A LOOSENING OF PROSTHETIC HIP (CMS-HCC): Status: ACTIVE | Noted: 2024-02-16

## 2024-04-25 PROBLEM — Z96.649 LOOSENING OF PROSTHETIC HIP (CMS-HCC): Status: ACTIVE | Noted: 2024-02-16

## 2024-04-25 PROBLEM — R27.9 UNSPECIFIED LACK OF COORDINATION: Status: ACTIVE | Noted: 2024-02-01

## 2024-04-25 PROBLEM — M19.90 UNSPECIFIED OSTEOARTHRITIS, UNSPECIFIED SITE: Status: ACTIVE | Noted: 2024-01-31

## 2024-04-25 PROBLEM — R26.81 UNSTEADINESS ON FEET: Status: ACTIVE | Noted: 2024-02-01

## 2024-04-25 PROBLEM — D50.9 IRON DEFICIENCY ANEMIA: Status: ACTIVE | Noted: 2024-04-25

## 2024-04-25 PROBLEM — M62.81 MUSCLE WEAKNESS (GENERALIZED): Status: ACTIVE | Noted: 2024-02-01

## 2024-04-25 PROBLEM — M25.50 ARTHRALGIA: Status: ACTIVE | Noted: 2024-02-16

## 2024-04-25 RX ORDER — GLIMEPIRIDE 4 MG/1
4 TABLET ORAL
Qty: 30 TABLET | Refills: 1 | OUTPATIENT
Start: 2024-04-25

## 2024-04-25 RX ORDER — ROSUVASTATIN CALCIUM 10 MG/1
10 TABLET, COATED ORAL DAILY
Qty: 30 TABLET | Refills: 1 | OUTPATIENT
Start: 2024-04-25

## 2024-04-25 RX ORDER — FAMOTIDINE 20 MG/1
20 TABLET, FILM COATED ORAL EVERY 12 HOURS
Qty: 28 TABLET | Refills: 0 | OUTPATIENT
Start: 2024-04-25

## 2024-04-25 RX ORDER — LISINOPRIL 10 MG/1
10 TABLET ORAL DAILY
Qty: 30 TABLET | Refills: 1 | OUTPATIENT
Start: 2024-04-25

## 2024-04-25 RX ORDER — METFORMIN HYDROCHLORIDE 1000 MG/1
1000 TABLET ORAL
Qty: 30 TABLET | Refills: 2 | OUTPATIENT
Start: 2024-04-25

## 2024-04-30 ASSESSMENT — ENCOUNTER SYMPTOMS
EYE REDNESS: 0
VOICE CHANGE: 0
SLEEP DISTURBANCE: 0
SPEECH DIFFICULTY: 0
DYSPHORIC MOOD: 0
DIAPHORESIS: 0
ADENOPATHY: 0
CHEST TIGHTNESS: 0
FACIAL ASYMMETRY: 0
NAUSEA: 0
POLYDIPSIA: 0
NUMBNESS: 0
APPETITE CHANGE: 0
ARTHRALGIAS: 1
WEAKNESS: 0
SINUS PRESSURE: 0
PHOTOPHOBIA: 0
FLANK PAIN: 0
AGITATION: 0
DIARRHEA: 0
COUGH: 0
ABDOMINAL PAIN: 0
VOMITING: 0
EYE DISCHARGE: 0
ACTIVITY CHANGE: 0
DIZZINESS: 0
CONFUSION: 0
HEMATURIA: 0
FATIGUE: 1
NECK STIFFNESS: 0
FREQUENCY: 0
TROUBLE SWALLOWING: 0
TREMORS: 0
JOINT SWELLING: 0
BLOOD IN STOOL: 0
RHINORRHEA: 0
CHILLS: 0
WHEEZING: 0
BRUISES/BLEEDS EASILY: 0
ABDOMINAL DISTENTION: 0
DYSURIA: 0
HALLUCINATIONS: 0
CONSTIPATION: 0
FEVER: 0
EYE ITCHING: 0
LIGHT-HEADEDNESS: 0
UNEXPECTED WEIGHT CHANGE: 0
WOUND: 0
CHOKING: 0
SORE THROAT: 0
SEIZURES: 0
EYE PAIN: 0
BACK PAIN: 0

## 2024-04-30 NOTE — PROGRESS NOTES
Subjective   Patient ID: Melchor Bergeron is a 64 y.o. male who presents for Hospital Follow-up (Discharge Facility:  [facility]/Discharge Diagnosis:  Encounter for other orthopedic aftercare/Admission Date:  3/5/24 /Discharge Date:  4/14/24 ///).      Patient comes to the office today after discharge from rehab facility.  Patient had surgery for osteomyelitis of left knee and continues to have issues with decreased range of motion and continues to have pain in his leg.  He is exercising his knee on a daily basis and continues to have at home physical therapy as he continues to have gradual improvement in range of motion.  His sister is with him and she is concerned regarding anxiety and depression and irritability.  Patient also has numerous issues with caries on his teeth and he has been following with a dentist and was recommended for numerous dental extractions but he has not been committing to getting that done because he feels that his mouth does not hurt currently and he would like to wait until his mouth starts to hurt.    Diabetes  He presents for his follow-up diabetic visit. He has type 2 diabetes mellitus. His disease course has been worsening. Hypoglycemia symptoms include nervousness/anxiousness. Pertinent negatives for hypoglycemia include no confusion, dizziness, seizures, speech difficulty or tremors. Associated symptoms include fatigue and foot paresthesias. Pertinent negatives for diabetes include no polydipsia, no polyuria and no weakness. Symptoms are improving. Risk factors for coronary artery disease include diabetes mellitus, dyslipidemia, hypertension and male sex. Current diabetic treatment includes oral agent (triple therapy). He is compliant with treatment all of the time. His weight is stable. He is following a generally healthy diet. When asked about meal planning, he reported none. He has not had a previous visit with a dietitian. He participates in exercise daily.  His home blood glucose trend is fluctuating minimally. An ACE inhibitor/angiotensin II receptor blocker is being taken.   Anxiety  Presents for follow-up visit. Symptoms include irritability and nervous/anxious behavior. Patient reports no confusion, dizziness, nausea or suicidal ideas.       Depression  Visit Type: follow-up  Patient presents with the following symptoms: irritability and nervousness/anxiety.  Patient is not experiencing: choking sensation, confusion and suicidal ideas.           Review of Systems   Constitutional:  Positive for fatigue and irritability. Negative for activity change, appetite change, chills, diaphoresis, fever and unexpected weight change.   HENT:  Positive for dental problem. Negative for congestion, ear pain, hearing loss, nosebleeds, postnasal drip, rhinorrhea, sinus pressure, sneezing, sore throat, tinnitus, trouble swallowing and voice change.    Eyes:  Negative for photophobia, pain, discharge, redness, itching and visual disturbance.   Respiratory:  Negative for cough, choking, chest tightness and wheezing.    Cardiovascular:  Negative for leg swelling.   Gastrointestinal:  Negative for abdominal distention, abdominal pain, blood in stool, constipation, diarrhea, nausea and vomiting.   Endocrine: Negative for cold intolerance, heat intolerance, polydipsia and polyuria.   Genitourinary:  Negative for dysuria, flank pain, frequency, hematuria and urgency.   Musculoskeletal:  Positive for arthralgias. Negative for back pain, joint swelling and neck stiffness.   Skin:  Negative for rash and wound.   Allergic/Immunologic: Negative for immunocompromised state.   Neurological:  Negative for dizziness, tremors, seizures, syncope, facial asymmetry, speech difficulty, weakness, light-headedness and numbness.   Hematological:  Negative for adenopathy. Does not bruise/bleed easily.   Psychiatric/Behavioral:  Positive for depression. Negative for agitation, behavioral problems, confusion,  "dysphoric mood, hallucinations, self-injury, sleep disturbance and suicidal ideas. The patient is nervous/anxious.        Objective   BP 76/51 (BP Location: Left arm, Patient Position: Sitting, BP Cuff Size: Adult)   Pulse 96   Temp 36.4 °C (97.5 °F) (Temporal)   Resp 16   Ht 1.753 m (5' 9\")   Wt 64 kg (141 lb)   SpO2 98%   BMI 20.82 kg/m²     Physical Exam  Constitutional:       General: He is not in acute distress.     Appearance: He is not ill-appearing or diaphoretic.   HENT:      Head: Normocephalic and atraumatic.      Right Ear: External ear normal.      Left Ear: External ear normal.      Nose: Nose normal. No rhinorrhea.   Eyes:      General: Lids are normal. No scleral icterus.        Right eye: No discharge.         Left eye: No discharge.      Conjunctiva/sclera: Conjunctivae normal.   Cardiovascular:      Rate and Rhythm: Normal rate and regular rhythm.      Pulses: Normal pulses.      Heart sounds: No murmur heard.  Pulmonary:      Effort: Pulmonary effort is normal. No respiratory distress.      Breath sounds: No decreased breath sounds, wheezing, rhonchi or rales.   Abdominal:      General: Bowel sounds are normal. There is no distension.      Palpations: Abdomen is soft. There is no mass.      Tenderness: There is no abdominal tenderness. There is no guarding or rebound.   Musculoskeletal:         General: No swelling.      Cervical back: No rigidity or tenderness.      Left knee: Decreased range of motion. Tenderness present.      Right lower leg: No edema.      Left lower leg: No edema.        Legs:       Comments: Diffuse arthritic deformities noted  Surgical scar healing well with no signs of redness or warmth or swelling   Lymphadenopathy:      Cervical: No cervical adenopathy.      Upper Body:      Right upper body: No supraclavicular adenopathy.      Left upper body: No supraclavicular adenopathy.   Skin:     General: Skin is warm and dry.      Coloration: Skin is not jaundiced or " pale.      Findings: No erythema, lesion or rash.   Neurological:      General: No focal deficit present.      Mental Status: He is alert and oriented to person, place, and time.      Sensory: No sensory deficit.      Motor: No weakness or tremor.      Coordination: Coordination normal.      Gait: Gait normal.   Psychiatric:         Mood and Affect: Mood normal. Affect is not inappropriate.         Behavior: Behavior normal.         Assessment/Plan   Diagnoses and all orders for this visit:  History of osteomyelitis  -     amoxicillin (Amoxil) 875 mg tablet; Take 1 tablet (875 mg) by mouth once daily.  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  -     amoxicillin (Amoxil) 875 mg tablet; Take 1 tablet (875 mg) by mouth once daily.  -     DULoxetine (Cymbalta) 30 mg DR capsule; Take 1 capsule (30 mg) by mouth once daily. Do not crush or chew.  Mixed hyperlipidemia  -     rosuvastatin (Crestor) 20 mg tablet; Take 1 tablet (20 mg) by mouth once daily.  Type 2 diabetes mellitus with microalbuminuria, without long-term current use of insulin (Multi)  -     rosuvastatin (Crestor) 20 mg tablet; Take 1 tablet (20 mg) by mouth once daily.  -     ertugliflozin (Steglatro) 15 mg tablet; Take 1 tablet (15 mg) by mouth once daily.  -     glimepiride (Amaryl) 4 mg tablet; Take 1 tablet (4 mg) by mouth once daily in the morning. Take before meals.  -     lisinopril 10 mg tablet; Take 1 tablet (10 mg) by mouth once daily.  -     metFORMIN (Glucophage) 1,000 mg tablet; Take 1 tablet (1,000 mg) by mouth once daily with breakfast.  -     semaglutide (Rybelsus) 3 mg tablet; Take 1 tablet (3 mg) by mouth once daily.  Primary hypertension  -     lisinopril 10 mg tablet; Take 1 tablet (10 mg) by mouth once daily.  Anxiety and depression  -     DULoxetine (Cymbalta) 30 mg DR capsule; Take 1 capsule (30 mg) by  mouth once daily. Do not crush or chew.  Recommend add trial of duloxetine as that will likely improve anxiety depression but also have some potential utility for chronic pain.  Importance of controlling sugar discussed.  Patient has been doing well with diet and maintaining medications.  Importance of controlling dental problems was reviewed.  Risks of recurrent infection/hematogenous spread from dental issues in the setting of diabetes was discussed.  Recommend follow through with dentist recommended course of therapy sooner rather than later to prevent any recurrent issues.  Patient stated understanding.

## 2024-05-01 ENCOUNTER — PATIENT OUTREACH (OUTPATIENT)
Dept: PRIMARY CARE | Facility: CLINIC | Age: 64
End: 2024-05-01

## 2024-05-01 ENCOUNTER — OFFICE VISIT (OUTPATIENT)
Dept: PRIMARY CARE | Facility: CLINIC | Age: 64
End: 2024-05-01
Payer: COMMERCIAL

## 2024-05-01 VITALS
BODY MASS INDEX: 20.88 KG/M2 | WEIGHT: 141 LBS | SYSTOLIC BLOOD PRESSURE: 76 MMHG | RESPIRATION RATE: 16 BRPM | HEIGHT: 69 IN | TEMPERATURE: 97.5 F | DIASTOLIC BLOOD PRESSURE: 51 MMHG | OXYGEN SATURATION: 98 % | HEART RATE: 96 BPM

## 2024-05-01 DIAGNOSIS — R80.9 TYPE 2 DIABETES MELLITUS WITH MICROALBUMINURIA, WITHOUT LONG-TERM CURRENT USE OF INSULIN (MULTI): ICD-10-CM

## 2024-05-01 DIAGNOSIS — I10 PRIMARY HYPERTENSION: ICD-10-CM

## 2024-05-01 DIAGNOSIS — E78.2 MIXED HYPERLIPIDEMIA: ICD-10-CM

## 2024-05-01 DIAGNOSIS — E11.29 TYPE 2 DIABETES MELLITUS WITH MICROALBUMINURIA, WITHOUT LONG-TERM CURRENT USE OF INSULIN (MULTI): ICD-10-CM

## 2024-05-01 DIAGNOSIS — F41.9 ANXIETY AND DEPRESSION: ICD-10-CM

## 2024-05-01 DIAGNOSIS — M00.862 ARTHRITIS DUE TO OTHER BACTERIA, LEFT KNEE (MULTI): ICD-10-CM

## 2024-05-01 DIAGNOSIS — Z87.39 HISTORY OF OSTEOMYELITIS: Primary | ICD-10-CM

## 2024-05-01 DIAGNOSIS — F32.A ANXIETY AND DEPRESSION: ICD-10-CM

## 2024-05-01 PROBLEM — M00.9 ARTHRITIS, SEPTIC (MULTI): Status: RESOLVED | Noted: 2024-01-19 | Resolved: 2024-05-01

## 2024-05-01 PROCEDURE — 1036F TOBACCO NON-USER: CPT | Performed by: FAMILY MEDICINE

## 2024-05-01 PROCEDURE — 3048F LDL-C <100 MG/DL: CPT | Performed by: FAMILY MEDICINE

## 2024-05-01 PROCEDURE — 3078F DIAST BP <80 MM HG: CPT | Performed by: FAMILY MEDICINE

## 2024-05-01 PROCEDURE — 99214 OFFICE O/P EST MOD 30 MIN: CPT | Performed by: FAMILY MEDICINE

## 2024-05-01 PROCEDURE — 3074F SYST BP LT 130 MM HG: CPT | Performed by: FAMILY MEDICINE

## 2024-05-01 PROCEDURE — 3044F HG A1C LEVEL LT 7.0%: CPT | Performed by: FAMILY MEDICINE

## 2024-05-01 PROCEDURE — 4010F ACE/ARB THERAPY RXD/TAKEN: CPT | Performed by: FAMILY MEDICINE

## 2024-05-01 PROCEDURE — 3060F POS MICROALBUMINURIA REV: CPT | Performed by: FAMILY MEDICINE

## 2024-05-01 RX ORDER — GLIMEPIRIDE 4 MG/1
TABLET ORAL
Refills: 0 | OUTPATIENT
Start: 2024-05-01

## 2024-05-01 RX ORDER — AMOXICILLIN 875 MG/1
TABLET, FILM COATED ORAL EVERY 24 HOURS
COMMUNITY
Start: 2024-04-29 | End: 2024-05-01 | Stop reason: SDUPTHER

## 2024-05-01 RX ORDER — DULOXETIN HYDROCHLORIDE 30 MG/1
30 CAPSULE, DELAYED RELEASE ORAL DAILY
Qty: 90 CAPSULE | Refills: 3 | Status: SHIPPED | OUTPATIENT
Start: 2024-05-01 | End: 2025-05-01

## 2024-05-01 RX ORDER — ROSUVASTATIN CALCIUM 20 MG/1
20 TABLET, COATED ORAL DAILY
Qty: 90 TABLET | Refills: 3 | Status: SHIPPED | OUTPATIENT
Start: 2024-05-01 | End: 2025-05-01

## 2024-05-01 RX ORDER — LISINOPRIL 10 MG/1
10 TABLET ORAL DAILY
Qty: 90 TABLET | Refills: 3 | Status: SHIPPED | OUTPATIENT
Start: 2024-05-01 | End: 2025-05-01

## 2024-05-01 RX ORDER — GLIMEPIRIDE 4 MG/1
4 TABLET ORAL
Qty: 90 TABLET | Refills: 3 | Status: SHIPPED | OUTPATIENT
Start: 2024-05-01 | End: 2025-05-01

## 2024-05-01 RX ORDER — AMOXICILLIN 875 MG/1
875 TABLET, FILM COATED ORAL DAILY
Qty: 90 TABLET | Refills: 3 | Status: SHIPPED | OUTPATIENT
Start: 2024-05-01 | End: 2025-04-26

## 2024-05-01 RX ORDER — CYCLOBENZAPRINE HCL 10 MG
10 TABLET ORAL 2 TIMES DAILY PRN
Qty: 180 TABLET | Refills: 3 | Status: SHIPPED | OUTPATIENT
Start: 2024-05-01 | End: 2025-05-01

## 2024-05-01 RX ORDER — METFORMIN HYDROCHLORIDE 1000 MG/1
1000 TABLET ORAL
Qty: 90 TABLET | Refills: 3 | Status: SHIPPED | OUTPATIENT
Start: 2024-05-01 | End: 2025-05-01

## 2024-05-01 RX ORDER — NAPROXEN 250 MG/1
250 TABLET ORAL AS NEEDED
COMMUNITY
End: 2024-05-01 | Stop reason: ALTCHOICE

## 2024-05-01 ASSESSMENT — ENCOUNTER SYMPTOMS
DEPRESSION: 1
IRRITABILITY: 1
NERVOUS/ANXIOUS: 1

## 2024-05-06 ENCOUNTER — TELEPHONE (OUTPATIENT)
Dept: PHARMACY | Facility: HOSPITAL | Age: 64
End: 2024-05-06
Payer: COMMERCIAL

## 2024-05-06 NOTE — TELEPHONE ENCOUNTER
Spoke with patient today - patient requesting 90 day supply of Rybelsus 3 mg to be sent to [facility]. [facility] called [facility] and pharmacy states they only have a 30 day supply with 11 refills on file. [facility] corrected prescription to reflect 90 day supply with 3 refills (spoke with pharmacy technician, Sebastián).    Thank you,  Jo-Ann Silva, PharmD

## 2024-05-22 ENCOUNTER — APPOINTMENT (OUTPATIENT)
Dept: PHARMACY | Facility: HOSPITAL | Age: 64
End: 2024-05-22
Payer: COMMERCIAL

## 2024-05-22 ENCOUNTER — TELEMEDICINE (OUTPATIENT)
Dept: PHARMACY | Facility: HOSPITAL | Age: 64
End: 2024-05-22
Payer: COMMERCIAL

## 2024-05-22 DIAGNOSIS — E11.29 TYPE 2 DIABETES MELLITUS WITH MICROALBUMINURIA, WITHOUT LONG-TERM CURRENT USE OF INSULIN (MULTI): ICD-10-CM

## 2024-05-22 DIAGNOSIS — R80.9 TYPE 2 DIABETES MELLITUS WITH MICROALBUMINURIA, WITHOUT LONG-TERM CURRENT USE OF INSULIN (MULTI): ICD-10-CM

## 2024-05-22 NOTE — PROGRESS NOTES
"Pharmacist Clinic: Diabetes Management  Melchor Bergeron is a 64 y.o. male was referred to Clinical Pharmacy Team for diabetes management.     Referring Provider: Alex Britt DO     HISTORY OF PRESENT ILLNESS  Spoke with patient today regarding diabetes management. Patient reports continued compliance to diabetes therapy.    Patient reports he will be having four teeth removed next Tuesday at 1 pm. Will continue on antibiotics.    Also, reports PT is going \"ok\", as he doesn't believe he is getting worse but also not better. Reports increased pain.    Still difficulties with insurance/COBRA, and in need of Rybelsus 3 mg to be mailed out from [facility] ($75). Does have about 2.5 months left of all other medications.    LAB REVIEW   Glucose (mg/dL)   Date Value   03/18/2024 85   02/07/2024 77   02/06/2024 81     Hemoglobin A1C (%)   Date Value   03/18/2024 6.8 (H)   02/26/2024 8.4 (H)   12/15/2023 15.0 (H)   12/04/2020 12.5     Bicarbonate (mmol/L)   Date Value   03/18/2024 20 (L)   02/07/2024 24   02/06/2024 26     Urea Nitrogen (mg/dL)   Date Value   03/18/2024 32 (H)   02/07/2024 38 (H)   02/06/2024 39 (H)     Creatinine (mg/dL)   Date Value   03/18/2024 1.00   02/07/2024 1.24   02/06/2024 1.25     Lab Results   Component Value Date    HGBA1C 6.8 (H) 03/18/2024    HGBA1C 8.4 (H) 02/26/2024    HGBA1C 15.0 (H) 12/15/2023     Lab Results   Component Value Date    LDLCALC 90 03/18/2024    CREATININE 1.00 03/18/2024     Lab Results   Component Value Date    CHOL 166 03/18/2024    CHOL 147 12/15/2023    CHOL 206 (H) 12/04/2020     Lab Results   Component Value Date    HDL 44.9 03/18/2024    HDL 57.1 12/15/2023    HDL 56.0 12/04/2020     Lab Results   Component Value Date    LDLCALC 90 03/18/2024    LDLCALC 76 12/15/2023     Lab Results   Component Value Date    TRIG 154 (H) 03/18/2024    TRIG 72 12/15/2023    TRIG 81 12/04/2020     No components found for: \"CHOLHDL\"  Lab Results   Component Value Date    LDLCALC " 90 03/18/2024       DIABETES ASSESSMENT    CURRENT PHARMACOTHERAPY  - glimepiride 4 mg daily  - metformin 1000 mg daily (sometimes takes in the evening if remembers)  - Steglatro 15 mg daily   - Rybelsus 3 mg daily    SECONDARY PREVENTION  - Statin? Yes - rosuvastatin 10 mg  - ACE-I/ARB? Yes - lisinopril 10 mg  - Aspirin? Yes  - Wears glasses (has dry eyes and uses eye drops); yearly check-ins  - No podiatrist or endocrinologist; checks feet daily    HISTORICAL PHARMACOTHERAPY  - Jardiance (expensive)    SMBG MEASUREMENTS  Accu-Chek glucometer  - No readings provided at time of appt.    Patient does not report symptoms of hypoglycemia.  Patient does report symptoms of hyperglycemia. Patient reportsexcessive thirst and polyuria.    RECOMMENDATIONS/PLAN  1. Patients diabetes is well controlled with most recent A1c of 6.8% (goal < 7 %).   - Continue all meds under the continuation of care with the referring provider and clinical pharmacy team.    2. Education:   - Provided emotional support today  - Applauded patient for hitting A1c goal  - Rybelsus prior auth approved for 90 days. Spoke with [facility] and confirmed medication will be mailed out to patient. LVM to notify patient.  - Counseled patient on MOA, expectations, side effects, duration of therapy, contraindications, administration, and monitoring parameters  - Answered all patient questions and concerns; provided [facility] phone number if issues/questions arise    Clinical Pharmacist follow up: 6/19/2024 @ 3 pm    Thank you,  Jo-Ann Silva, PharmD    Verbal consent to manage patient's drug therapy was obtained from patient. They were informed they may decline to participate or withdraw from participation in pharmacy services at any time.

## 2024-05-30 ENCOUNTER — PATIENT OUTREACH (OUTPATIENT)
Dept: CARE COORDINATION | Facility: CLINIC | Age: 64
End: 2024-05-30
Payer: COMMERCIAL

## 2024-06-06 ENCOUNTER — LAB (OUTPATIENT)
Dept: LAB | Facility: LAB | Age: 64
End: 2024-06-06
Payer: COMMERCIAL

## 2024-06-06 DIAGNOSIS — R80.9 TYPE 2 DIABETES MELLITUS WITH MICROALBUMINURIA, WITHOUT LONG-TERM CURRENT USE OF INSULIN (MULTI): ICD-10-CM

## 2024-06-06 DIAGNOSIS — I10 PRIMARY HYPERTENSION: ICD-10-CM

## 2024-06-06 DIAGNOSIS — E78.2 MIXED HYPERLIPIDEMIA: ICD-10-CM

## 2024-06-06 DIAGNOSIS — M00.862 ARTHRITIS DUE TO OTHER BACTERIA, LEFT KNEE (MULTI): ICD-10-CM

## 2024-06-06 DIAGNOSIS — E11.29 TYPE 2 DIABETES MELLITUS WITH MICROALBUMINURIA, WITHOUT LONG-TERM CURRENT USE OF INSULIN (MULTI): ICD-10-CM

## 2024-06-06 DIAGNOSIS — Z11.59 ENCOUNTER FOR HEPATITIS C SCREENING TEST FOR LOW RISK PATIENT: ICD-10-CM

## 2024-06-06 DIAGNOSIS — D50.9 IRON DEFICIENCY ANEMIA, UNSPECIFIED IRON DEFICIENCY ANEMIA TYPE: ICD-10-CM

## 2024-06-06 LAB
ALBUMIN SERPL BCP-MCNC: 3.9 G/DL (ref 3.4–5)
ALP SERPL-CCNC: 77 U/L (ref 33–136)
ALT SERPL W P-5'-P-CCNC: 13 U/L (ref 10–52)
ANION GAP SERPL CALC-SCNC: 13 MMOL/L (ref 10–20)
AST SERPL W P-5'-P-CCNC: 12 U/L (ref 9–39)
BASOPHILS # BLD AUTO: 0.04 X10*3/UL (ref 0–0.1)
BASOPHILS NFR BLD AUTO: 1 %
BILIRUB SERPL-MCNC: 0.4 MG/DL (ref 0–1.2)
BUN SERPL-MCNC: 20 MG/DL (ref 6–23)
CALCIUM SERPL-MCNC: 9.5 MG/DL (ref 8.6–10.3)
CHLORIDE SERPL-SCNC: 102 MMOL/L (ref 98–107)
CHOLEST SERPL-MCNC: 217 MG/DL (ref 0–199)
CHOLESTEROL/HDL RATIO: 4.1
CO2 SERPL-SCNC: 27 MMOL/L (ref 21–32)
CREAT SERPL-MCNC: 1 MG/DL (ref 0.5–1.3)
EGFRCR SERPLBLD CKD-EPI 2021: 85 ML/MIN/1.73M*2
EOSINOPHIL # BLD AUTO: 0.08 X10*3/UL (ref 0–0.7)
EOSINOPHIL NFR BLD AUTO: 2 %
ERYTHROCYTE [DISTWIDTH] IN BLOOD BY AUTOMATED COUNT: 15.4 % (ref 11.5–14.5)
EST. AVERAGE GLUCOSE BLD GHB EST-MCNC: 194 MG/DL
GLUCOSE SERPL-MCNC: 157 MG/DL (ref 74–99)
HBA1C MFR BLD: 8.4 %
HCT VFR BLD AUTO: 44.3 % (ref 41–52)
HCV AB SER QL: NONREACTIVE
HDLC SERPL-MCNC: 52.6 MG/DL
HGB BLD-MCNC: 13.9 G/DL (ref 13.5–17.5)
IMM GRANULOCYTES # BLD AUTO: 0.01 X10*3/UL (ref 0–0.7)
IMM GRANULOCYTES NFR BLD AUTO: 0.2 % (ref 0–0.9)
IRON SATN MFR SERPL: 21 % (ref 25–45)
IRON SERPL-MCNC: 71 UG/DL (ref 35–150)
LDLC SERPL CALC-MCNC: 132 MG/DL
LYMPHOCYTES # BLD AUTO: 1.1 X10*3/UL (ref 1.2–4.8)
LYMPHOCYTES NFR BLD AUTO: 27.2 %
MAGNESIUM SERPL-MCNC: 1.6 MG/DL (ref 1.6–2.4)
MCH RBC QN AUTO: 27.6 PG (ref 26–34)
MCHC RBC AUTO-ENTMCNC: 31.4 G/DL (ref 32–36)
MCV RBC AUTO: 88 FL (ref 80–100)
MONOCYTES # BLD AUTO: 0.28 X10*3/UL (ref 0.1–1)
MONOCYTES NFR BLD AUTO: 6.9 %
NEUTROPHILS # BLD AUTO: 2.53 X10*3/UL (ref 1.2–7.7)
NEUTROPHILS NFR BLD AUTO: 62.7 %
NON HDL CHOLESTEROL: 164 MG/DL (ref 0–149)
NRBC BLD-RTO: 0 /100 WBCS (ref 0–0)
PLATELET # BLD AUTO: 198 X10*3/UL (ref 150–450)
POTASSIUM SERPL-SCNC: 4.9 MMOL/L (ref 3.5–5.3)
PROT SERPL-MCNC: 6.3 G/DL (ref 6.4–8.2)
RBC # BLD AUTO: 5.03 X10*6/UL (ref 4.5–5.9)
SODIUM SERPL-SCNC: 137 MMOL/L (ref 136–145)
TIBC SERPL-MCNC: 346 UG/DL (ref 240–445)
TRIGL SERPL-MCNC: 161 MG/DL (ref 0–149)
TSH SERPL-ACNC: 3.82 MIU/L (ref 0.44–3.98)
UIBC SERPL-MCNC: 275 UG/DL (ref 110–370)
VLDL: 32 MG/DL (ref 0–40)
WBC # BLD AUTO: 4 X10*3/UL (ref 4.4–11.3)

## 2024-06-06 PROCEDURE — 83036 HEMOGLOBIN GLYCOSYLATED A1C: CPT

## 2024-06-06 PROCEDURE — 86803 HEPATITIS C AB TEST: CPT

## 2024-06-06 PROCEDURE — 85025 COMPLETE CBC W/AUTO DIFF WBC: CPT

## 2024-06-06 PROCEDURE — 83550 IRON BINDING TEST: CPT

## 2024-06-06 PROCEDURE — 83540 ASSAY OF IRON: CPT

## 2024-06-06 PROCEDURE — 80053 COMPREHEN METABOLIC PANEL: CPT

## 2024-06-06 PROCEDURE — 84443 ASSAY THYROID STIM HORMONE: CPT

## 2024-06-06 PROCEDURE — 80061 LIPID PANEL: CPT

## 2024-06-06 PROCEDURE — 83735 ASSAY OF MAGNESIUM: CPT

## 2024-06-11 ENCOUNTER — OFFICE VISIT (OUTPATIENT)
Dept: PRIMARY CARE | Facility: CLINIC | Age: 64
End: 2024-06-11
Payer: COMMERCIAL

## 2024-06-11 VITALS
HEART RATE: 77 BPM | TEMPERATURE: 97.5 F | WEIGHT: 157 LBS | OXYGEN SATURATION: 98 % | BODY MASS INDEX: 23.25 KG/M2 | SYSTOLIC BLOOD PRESSURE: 125 MMHG | DIASTOLIC BLOOD PRESSURE: 74 MMHG | RESPIRATION RATE: 16 BRPM | HEIGHT: 69 IN

## 2024-06-11 DIAGNOSIS — E11.29 TYPE 2 DIABETES MELLITUS WITH MICROALBUMINURIA, WITHOUT LONG-TERM CURRENT USE OF INSULIN (MULTI): Primary | ICD-10-CM

## 2024-06-11 DIAGNOSIS — Z11.59 ENCOUNTER FOR HEPATITIS C SCREENING TEST FOR LOW RISK PATIENT: ICD-10-CM

## 2024-06-11 DIAGNOSIS — R80.9 TYPE 2 DIABETES MELLITUS WITH MICROALBUMINURIA, WITHOUT LONG-TERM CURRENT USE OF INSULIN (MULTI): Primary | ICD-10-CM

## 2024-06-11 DIAGNOSIS — D50.9 IRON DEFICIENCY ANEMIA, UNSPECIFIED IRON DEFICIENCY ANEMIA TYPE: ICD-10-CM

## 2024-06-11 DIAGNOSIS — I10 PRIMARY HYPERTENSION: ICD-10-CM

## 2024-06-11 DIAGNOSIS — E78.2 MIXED HYPERLIPIDEMIA: ICD-10-CM

## 2024-06-11 DIAGNOSIS — M00.862 ARTHRITIS DUE TO OTHER BACTERIA, LEFT KNEE (MULTI): ICD-10-CM

## 2024-06-11 PROBLEM — R27.9 UNSPECIFIED LACK OF COORDINATION: Status: RESOLVED | Noted: 2024-02-01 | Resolved: 2024-06-11

## 2024-06-11 PROBLEM — M19.90 UNSPECIFIED OSTEOARTHRITIS, UNSPECIFIED SITE: Status: RESOLVED | Noted: 2024-01-31 | Resolved: 2024-06-11

## 2024-06-11 PROBLEM — M79.605 PAIN IN POSTERIOR LEFT LOWER EXTREMITY: Status: RESOLVED | Noted: 2023-12-14 | Resolved: 2024-06-11

## 2024-06-11 PROBLEM — T84.038A LOOSENING OF PROSTHETIC HIP (CMS-HCC): Status: RESOLVED | Noted: 2024-02-16 | Resolved: 2024-06-11

## 2024-06-11 PROBLEM — R25.1 TREMOR: Status: ACTIVE | Noted: 2024-02-01

## 2024-06-11 PROBLEM — M25.50 ARTHRALGIA: Status: RESOLVED | Noted: 2024-02-16 | Resolved: 2024-06-11

## 2024-06-11 PROBLEM — Z96.649 LOOSENING OF PROSTHETIC HIP (CMS-HCC): Status: RESOLVED | Noted: 2024-02-16 | Resolved: 2024-06-11

## 2024-06-11 PROCEDURE — 3074F SYST BP LT 130 MM HG: CPT | Performed by: FAMILY MEDICINE

## 2024-06-11 PROCEDURE — 3078F DIAST BP <80 MM HG: CPT | Performed by: FAMILY MEDICINE

## 2024-06-11 PROCEDURE — 3060F POS MICROALBUMINURIA REV: CPT | Performed by: FAMILY MEDICINE

## 2024-06-11 PROCEDURE — 99214 OFFICE O/P EST MOD 30 MIN: CPT | Performed by: FAMILY MEDICINE

## 2024-06-11 PROCEDURE — 3050F LDL-C >= 130 MG/DL: CPT | Performed by: FAMILY MEDICINE

## 2024-06-11 PROCEDURE — 4010F ACE/ARB THERAPY RXD/TAKEN: CPT | Performed by: FAMILY MEDICINE

## 2024-06-11 PROCEDURE — 3052F HG A1C>EQUAL 8.0%<EQUAL 9.0%: CPT | Performed by: FAMILY MEDICINE

## 2024-06-11 ASSESSMENT — ENCOUNTER SYMPTOMS
NERVOUS/ANXIOUS: 0
SORE THROAT: 0
PHOTOPHOBIA: 0
LIGHT-HEADEDNESS: 0
CHOKING: 0
UNEXPECTED WEIGHT CHANGE: 0
BRUISES/BLEEDS EASILY: 0
FEVER: 0
VOMITING: 0
ABDOMINAL PAIN: 0
VOICE CHANGE: 0
BACK PAIN: 0
CHILLS: 0
FACIAL ASYMMETRY: 0
WHEEZING: 0
HYPERTENSION: 1
PALPITATIONS: 0
NECK PAIN: 0
DYSURIA: 0
WEAKNESS: 0
DIZZINESS: 0
EYE PAIN: 0
HEMATURIA: 0
EYE REDNESS: 0
BLOOD IN STOOL: 0
SHORTNESS OF BREATH: 0
ARTHRALGIAS: 1
EYE ITCHING: 0
DIARRHEA: 0
HEADACHES: 0
DIAPHORESIS: 0
SLEEP DISTURBANCE: 0
RHINORRHEA: 0
POLYDIPSIA: 0
ABDOMINAL DISTENTION: 0
TROUBLE SWALLOWING: 0
WOUND: 0
FREQUENCY: 0
SEIZURES: 0
CONSTIPATION: 0
TREMORS: 0
FLANK PAIN: 0
FATIGUE: 1
CHEST TIGHTNESS: 0
HALLUCINATIONS: 0
SINUS PRESSURE: 0
NECK STIFFNESS: 0
AGITATION: 0
JOINT SWELLING: 0
ACTIVITY CHANGE: 0
CONFUSION: 0
COUGH: 0
EYE DISCHARGE: 0
ADENOPATHY: 0
APPETITE CHANGE: 0
MYALGIAS: 0
SPEECH DIFFICULTY: 0
DYSPHORIC MOOD: 0
NUMBNESS: 0
NAUSEA: 0

## 2024-06-11 NOTE — PROGRESS NOTES
Subjective   Patient ID: Melchor Bergeron is a 64 y.o. male who presents for 3 month check up (And review labs ) and 3 month check up  (and review labs).        Diabetes  He presents for his follow-up diabetic visit. He has type 2 diabetes mellitus. His disease course has been worsening. Pertinent negatives for hypoglycemia include no confusion, dizziness, headaches, nervousness/anxiousness, seizures, speech difficulty or tremors. Associated symptoms include fatigue. Pertinent negatives for diabetes include no chest pain, no foot paresthesias, no polydipsia, no polyuria and no weakness. Symptoms are stable. Risk factors for coronary artery disease include diabetes mellitus, dyslipidemia, hypertension and male sex. Current diabetic treatment includes oral agent (triple therapy). He is compliant with treatment none of the time. His weight is stable. He is following a generally healthy diet. When asked about meal planning, he reported none. He has not had a previous visit with a dietitian. He participates in exercise intermittently. His home blood glucose trend is increasing rapidly. An ACE inhibitor/angiotensin II receptor blocker is being taken.   Hypertension  This is a chronic problem. The current episode started more than 1 year ago. The problem is unchanged. The problem is controlled. Associated symptoms include malaise/fatigue. Pertinent negatives include no chest pain, headaches, neck pain, palpitations or shortness of breath. There are no associated agents to hypertension. Risk factors for coronary artery disease include diabetes mellitus, dyslipidemia and male gender. Past treatments include ACE inhibitors. The current treatment provides significant improvement. There is no history of a hypertension causing med or a thyroid problem.   Hyperlipidemia  This is a chronic problem. The current episode started more than 1 year ago. The problem is controlled. Recent lipid tests were reviewed and are variable.  Exacerbating diseases include diabetes. Pertinent negatives include no chest pain, myalgias or shortness of breath. Current antihyperlipidemic treatment includes statins. The current treatment provides significant improvement of lipids. There are no compliance problems.         Review of Systems   Constitutional:  Positive for fatigue and malaise/fatigue. Negative for activity change, appetite change, chills, diaphoresis, fever and unexpected weight change.   HENT:  Negative for congestion, ear pain, hearing loss, nosebleeds, postnasal drip, rhinorrhea, sinus pressure, sneezing, sore throat, tinnitus, trouble swallowing and voice change.    Eyes:  Negative for photophobia, pain, discharge, redness, itching and visual disturbance.   Respiratory:  Negative for cough, choking, chest tightness, shortness of breath and wheezing.    Cardiovascular:  Negative for chest pain, palpitations and leg swelling.   Gastrointestinal:  Negative for abdominal distention, abdominal pain, blood in stool, constipation, diarrhea, nausea and vomiting.   Endocrine: Negative for cold intolerance, heat intolerance, polydipsia and polyuria.   Genitourinary:  Negative for dysuria, flank pain, frequency, hematuria and urgency.   Musculoskeletal:  Positive for arthralgias. Negative for back pain, joint swelling, myalgias, neck pain and neck stiffness.   Skin:  Negative for rash and wound.   Allergic/Immunologic: Negative for immunocompromised state.   Neurological:  Negative for dizziness, tremors, seizures, syncope, facial asymmetry, speech difficulty, weakness, light-headedness, numbness and headaches.   Hematological:  Negative for adenopathy. Does not bruise/bleed easily.   Psychiatric/Behavioral:  Negative for agitation, behavioral problems, confusion, dysphoric mood, hallucinations, self-injury, sleep disturbance and suicidal ideas. The patient is not nervous/anxious.        Objective   /74 (BP Location: Left arm, Patient Position:  "Sitting, BP Cuff Size: Adult)   Pulse 77   Temp 36.4 °C (97.5 °F) (Temporal)   Resp 16   Ht 1.753 m (5' 9\")   Wt 71.2 kg (157 lb)   SpO2 98%   BMI 23.18 kg/m²     Physical Exam  Constitutional:       General: He is not in acute distress.     Appearance: He is not ill-appearing or diaphoretic.   HENT:      Head: Normocephalic and atraumatic.      Right Ear: External ear normal.      Left Ear: External ear normal.      Nose: Nose normal. No rhinorrhea.   Eyes:      General: Lids are normal. No scleral icterus.        Right eye: No discharge.         Left eye: No discharge.      Conjunctiva/sclera: Conjunctivae normal.   Cardiovascular:      Rate and Rhythm: Normal rate and regular rhythm.      Pulses: Normal pulses.      Heart sounds: No murmur heard.  Pulmonary:      Effort: Pulmonary effort is normal. No respiratory distress.      Breath sounds: No decreased breath sounds, wheezing, rhonchi or rales.   Abdominal:      General: Bowel sounds are normal. There is no distension.      Palpations: Abdomen is soft. There is no mass.      Tenderness: There is no abdominal tenderness. There is no guarding or rebound.   Musculoskeletal:         General: No swelling or tenderness.      Cervical back: No rigidity or tenderness.      Right lower leg: No edema.      Left lower leg: No edema.      Comments: Diffuse arthritic deformities noted   Lymphadenopathy:      Cervical: No cervical adenopathy.      Upper Body:      Right upper body: No supraclavicular adenopathy.      Left upper body: No supraclavicular adenopathy.   Skin:     General: Skin is warm and dry.      Coloration: Skin is not jaundiced or pale.      Findings: No erythema, lesion or rash.   Neurological:      General: No focal deficit present.      Mental Status: He is alert and oriented to person, place, and time.      Sensory: No sensory deficit.      Motor: No weakness or tremor.      Coordination: Coordination normal.      Gait: Gait normal. "   Psychiatric:         Mood and Affect: Mood normal. Affect is not inappropriate.         Behavior: Behavior normal.         Assessment/Plan   Diagnoses and all orders for this visit:  Type 2 diabetes mellitus with microalbuminuria, without long-term current use of insulin (Multi)  -     Follow Up In [facility] - PCP - Established  -     semaglutide (Rybelsus) 7 mg tablet; Take 1 tablet (7 mg) by mouth once daily.  -     Albumin , Urine Random; Future  -     CBC and Auto Differential; Future  -     Comprehensive Metabolic Panel; Future  -     Hemoglobin A1C; Future  -     Lipid Panel; Future  -     Magnesium; Future  -     TSH with reflex to Free T4 if abnormal; Future  Primary hypertension  -     Follow Up In [facility] - PCP - Established  -     Albumin , Urine Random; Future  -     CBC and Auto Differential; Future  -     Comprehensive Metabolic Panel; Future  -     Lipid Panel; Future  -     Magnesium; Future  -     TSH with reflex to Free T4 if abnormal; Future  Mixed hyperlipidemia  -     Follow Up In [facility] - PCP - Established  -     Comprehensive Metabolic Panel; Future  -     Lipid Panel; Future  -     TSH with reflex to Free T4 if abnormal; Future  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

## 2024-06-12 DIAGNOSIS — E11.29 TYPE 2 DIABETES MELLITUS WITH MICROALBUMINURIA, WITHOUT LONG-TERM CURRENT USE OF INSULIN (MULTI): ICD-10-CM

## 2024-06-12 DIAGNOSIS — K21.9 GASTROESOPHAGEAL REFLUX DISEASE, UNSPECIFIED WHETHER ESOPHAGITIS PRESENT: ICD-10-CM

## 2024-06-12 DIAGNOSIS — R80.9 TYPE 2 DIABETES MELLITUS WITH MICROALBUMINURIA, WITHOUT LONG-TERM CURRENT USE OF INSULIN (MULTI): ICD-10-CM

## 2024-06-12 DIAGNOSIS — E78.2 MIXED HYPERLIPIDEMIA: Primary | ICD-10-CM

## 2024-06-12 RX ORDER — ROSUVASTATIN CALCIUM 10 MG/1
TABLET, COATED ORAL
Qty: 90 TABLET | Refills: 3 | Status: SHIPPED | OUTPATIENT
Start: 2024-06-12

## 2024-06-12 RX ORDER — FAMOTIDINE 20 MG/1
20 TABLET, FILM COATED ORAL 2 TIMES DAILY
Qty: 180 TABLET | Refills: 3 | Status: SHIPPED | OUTPATIENT
Start: 2024-06-12

## 2024-06-19 ENCOUNTER — APPOINTMENT (OUTPATIENT)
Dept: PHARMACY | Facility: HOSPITAL | Age: 64
End: 2024-06-19
Payer: COMMERCIAL

## 2024-06-19 DIAGNOSIS — E11.29 TYPE 2 DIABETES MELLITUS WITH MICROALBUMINURIA, WITHOUT LONG-TERM CURRENT USE OF INSULIN (MULTI): ICD-10-CM

## 2024-06-19 DIAGNOSIS — R80.9 TYPE 2 DIABETES MELLITUS WITH MICROALBUMINURIA, WITHOUT LONG-TERM CURRENT USE OF INSULIN (MULTI): ICD-10-CM

## 2024-06-19 NOTE — PROGRESS NOTES
Pharmacist Clinic: Diabetes Management  Melchor Bergeron is a 64 y.o. male was referred to Clinical Pharmacy Team for diabetes management.     Referring Provider: Alex Britt DO     HISTORY OF PRESENT ILLNESS  Spoke with patient today regarding diabetes management. Patient reports continued compliance to diabetes therapy. Since last visit, patient saw PCP and A1c has increased to 8.4%. Patient attributes increase as he has been trying to eat more in order to gain weight. Patient lost a considerable amount of weight over the past 6 months due to knee surgery/recovery. Patient states he has been walking with walker, or can use electric chair/wheelchair for longer trips.    PT was stopped on Monday, 6/17 due to ortho not requesting more sessions. Patient has been doing PT on is own.    LAB REVIEW   Glucose (mg/dL)   Date Value   06/06/2024 157 (H)   03/18/2024 85   02/07/2024 77     Hemoglobin A1C (%)   Date Value   06/06/2024 8.4 (H)   03/18/2024 6.8 (H)   02/26/2024 8.4 (H)   12/15/2023 15.0 (H)     Bicarbonate (mmol/L)   Date Value   06/06/2024 27   03/18/2024 20 (L)   02/07/2024 24     Urea Nitrogen (mg/dL)   Date Value   06/06/2024 20   03/18/2024 32 (H)   02/07/2024 38 (H)     Creatinine (mg/dL)   Date Value   06/06/2024 1.00   03/18/2024 1.00   02/07/2024 1.24     Lab Results   Component Value Date    HGBA1C 8.4 (H) 06/06/2024    HGBA1C 6.8 (H) 03/18/2024    HGBA1C 8.4 (H) 02/26/2024     Lab Results   Component Value Date    LDLCALC 132 (H) 06/06/2024    CREATININE 1.00 06/06/2024     Lab Results   Component Value Date    CHOL 217 (H) 06/06/2024    CHOL 166 03/18/2024    CHOL 147 12/15/2023     Lab Results   Component Value Date    HDL 52.6 06/06/2024    HDL 44.9 03/18/2024    HDL 57.1 12/15/2023     Lab Results   Component Value Date    LDLCALC 132 (H) 06/06/2024    LDLCALC 90 03/18/2024    LDLCALC 76 12/15/2023     Lab Results   Component Value Date    TRIG 161 (H) 06/06/2024    TRIG 154 (H) 03/18/2024     "TRIG 72 12/15/2023     No components found for: \"CHOLHDL\"  Lab Results   Component Value Date    LDLCALC 132 (H) 06/06/2024       DIABETES ASSESSMENT    CURRENT PHARMACOTHERAPY  - glimepiride 4 mg daily  - metformin 1000 mg daily (sometimes takes in the evening if remembers)  - Steglatro 15 mg daily   - Rybelsus 3 mg daily    SECONDARY PREVENTION  - Statin? Yes - rosuvastatin 10 mg  - ACE-I/ARB? Yes - lisinopril 10 mg  - Aspirin? Yes  - Wears glasses (has dry eyes and uses eye drops); yearly check-ins  - No podiatrist or endocrinologist; checks feet daily    HISTORICAL PHARMACOTHERAPY  - Jardiance (expensive)    SMBG MEASUREMENTS  Accu-Chek glucometer  - No readings provided at time of appt.    Patient does not report symptoms of hypoglycemia.  Patient does report symptoms of hyperglycemia. Patient reportsexcessive thirst and polyuria.    RECOMMENDATIONS/PLAN  1. Patients diabetes is worsening with most recent A1c of 8.4% (goal < 7 %).   - Continue all meds under the continuation of care with the referring provider and clinical pharmacy team.    2. Education:   - Advised patient to begin recording blood glucose readings to discuss at follow-up  - Patient confirmed he has medication for at least another month at this time.  - Counseled patient on MOA, expectations, side effects, duration of therapy, contraindications, administration, and monitoring parameters  - Answered all patient questions and concerns; provided [facility] phone number if issues/questions arise    Clinical Pharmacist follow up: 7/10/2024 @ 3 pm    Thank you,  Jo-Ann Silva, Jacoby    Verbal consent to manage patient's drug therapy was obtained from patient. They were informed they may decline to participate or withdraw from participation in pharmacy services at any time.  "

## 2024-07-02 ENCOUNTER — PATIENT OUTREACH (OUTPATIENT)
Dept: PRIMARY CARE | Facility: CLINIC | Age: 64
End: 2024-07-02
Payer: COMMERCIAL

## 2024-07-02 NOTE — PROGRESS NOTES
Final 90 day call back to assess needs from hospitalization..  LVM for pt   Contact info provided.

## 2024-07-09 ENCOUNTER — TELEMEDICINE (OUTPATIENT)
Dept: PHARMACY | Facility: HOSPITAL | Age: 64
End: 2024-07-09
Payer: COMMERCIAL

## 2024-07-09 DIAGNOSIS — R80.9 TYPE 2 DIABETES MELLITUS WITH MICROALBUMINURIA, WITHOUT LONG-TERM CURRENT USE OF INSULIN (MULTI): ICD-10-CM

## 2024-07-09 DIAGNOSIS — E11.29 TYPE 2 DIABETES MELLITUS WITH MICROALBUMINURIA, WITHOUT LONG-TERM CURRENT USE OF INSULIN (MULTI): ICD-10-CM

## 2024-07-09 NOTE — PROGRESS NOTES
"Pharmacist Clinic: Diabetes Management  Melchor Bergeron is a 64 y.o. male was referred to Clinical Pharmacy Team for diabetes management.     Referring Provider: Alex Britt DO     HISTORY OF PRESENT ILLNESS  Spoke with patient today regarding diabetes management. Patient reports continued compliance to diabetes therapy. Since last visit, patient denies any new concerns or changes today. Received increased Rybelsus dose from pharmacy, however has not yet started. No noted blood glucose readings today.    LAB REVIEW   Glucose (mg/dL)   Date Value   06/06/2024 157 (H)   03/18/2024 85   02/07/2024 77     Hemoglobin A1C (%)   Date Value   06/06/2024 8.4 (H)   03/18/2024 6.8 (H)   02/26/2024 8.4 (H)   12/15/2023 15.0 (H)     Bicarbonate (mmol/L)   Date Value   06/06/2024 27   03/18/2024 20 (L)   02/07/2024 24     Urea Nitrogen (mg/dL)   Date Value   06/06/2024 20   03/18/2024 32 (H)   02/07/2024 38 (H)     Creatinine (mg/dL)   Date Value   06/06/2024 1.00   03/18/2024 1.00   02/07/2024 1.24     Lab Results   Component Value Date    HGBA1C 8.4 (H) 06/06/2024    HGBA1C 6.8 (H) 03/18/2024    HGBA1C 8.4 (H) 02/26/2024     Lab Results   Component Value Date    LDLCALC 132 (H) 06/06/2024    CREATININE 1.00 06/06/2024     Lab Results   Component Value Date    CHOL 217 (H) 06/06/2024    CHOL 166 03/18/2024    CHOL 147 12/15/2023     Lab Results   Component Value Date    HDL 52.6 06/06/2024    HDL 44.9 03/18/2024    HDL 57.1 12/15/2023     Lab Results   Component Value Date    LDLCALC 132 (H) 06/06/2024    LDLCALC 90 03/18/2024    LDLCALC 76 12/15/2023     Lab Results   Component Value Date    TRIG 161 (H) 06/06/2024    TRIG 154 (H) 03/18/2024    TRIG 72 12/15/2023     No components found for: \"CHOLHDL\"  Lab Results   Component Value Date    LDLCALC 132 (H) 06/06/2024       DIABETES ASSESSMENT    CURRENT PHARMACOTHERAPY  - glimepiride 4 mg daily  - metformin 1000 mg daily (sometimes takes in the evening if remembers)  - " Steglatro 15 mg daily   - Rybelsus 3 mg daily    SECONDARY PREVENTION  - Statin? Yes - rosuvastatin 10 mg  - ACE-I/ARB? Yes - lisinopril 10 mg  - Aspirin? Yes  - Wears glasses (has dry eyes and uses eye drops); yearly check-ins  - No podiatrist or endocrinologist; checks feet daily    HISTORICAL PHARMACOTHERAPY  - Jardiance (expensive)    SMBG MEASUREMENTS  Accu-Chek glucometer  - No readings provided at time of appt.    Patient does not report symptoms of hypoglycemia.  Patient does report symptoms of hyperglycemia. Patient reportsexcessive thirst and polyuria.    RECOMMENDATIONS/PLAN  1. Patients diabetes is worsening with most recent A1c of 8.4% (goal < 7 %).   - Increase: Rybelsus 7 mg daily  - Continue all meds under the continuation of care with the referring provider and clinical pharmacy team.    2. Education:   - Patient to increase to Rybelsus 7 mg once 3 mg is gone. Reviewed side effects associated with medication dose increase.  - Advised patient to begin recording blood glucose readings to discuss at follow-up  - Counseled patient on MOA, expectations, side effects, duration of therapy, contraindications, administration, and monitoring parameters  - Answered all patient questions and concerns; provided [facility] phone number if issues/questions arise    Clinical Pharmacist follow up: 8/6/2024 @ 3 pm    Thank you,  Jo-Ann Silva, PharmD    Verbal consent to manage patient's drug therapy was obtained from patient. They were informed they may decline to participate or withdraw from participation in pharmacy services at any time.

## 2024-07-10 ENCOUNTER — APPOINTMENT (OUTPATIENT)
Dept: PHARMACY | Facility: HOSPITAL | Age: 64
End: 2024-07-10
Payer: COMMERCIAL

## 2024-08-06 ENCOUNTER — APPOINTMENT (OUTPATIENT)
Dept: PHARMACY | Facility: HOSPITAL | Age: 64
End: 2024-08-06
Payer: COMMERCIAL

## 2024-08-06 DIAGNOSIS — R80.9 TYPE 2 DIABETES MELLITUS WITH MICROALBUMINURIA, WITHOUT LONG-TERM CURRENT USE OF INSULIN (MULTI): ICD-10-CM

## 2024-08-06 DIAGNOSIS — E11.29 TYPE 2 DIABETES MELLITUS WITH MICROALBUMINURIA, WITHOUT LONG-TERM CURRENT USE OF INSULIN (MULTI): ICD-10-CM

## 2024-08-06 NOTE — PROGRESS NOTES
"Pharmacist Clinic: Diabetes Management  Melchor Bergeron is a 64 y.o. male was referred to Clinical Pharmacy Team for diabetes management.     Referring Provider: Alex Britt DO     HISTORY OF PRESENT ILLNESS  Spoke with patient today regarding diabetes management. Patient reports continued compliance to diabetes therapy. Since last visit, Rybelsus was increased to 7 mg daily. Patient denies any new concerns however has only had about two doses of Rybelsus 7 mg. Reports blood glucose readings have been at goal. Has been having a light breakfast, lunch and dinner. Limited activity still due to knee.    Patient's long term disability was denied. Reports waiting on  for more information to appeal decision.    LAB REVIEW   Glucose (mg/dL)   Date Value   06/06/2024 157 (H)   03/18/2024 85   02/07/2024 77     Hemoglobin A1C (%)   Date Value   06/06/2024 8.4 (H)   03/18/2024 6.8 (H)   02/26/2024 8.4 (H)   12/15/2023 15.0 (H)     Bicarbonate (mmol/L)   Date Value   06/06/2024 27   03/18/2024 20 (L)   02/07/2024 24     Urea Nitrogen (mg/dL)   Date Value   06/06/2024 20   03/18/2024 32 (H)   02/07/2024 38 (H)     Creatinine (mg/dL)   Date Value   06/06/2024 1.00   03/18/2024 1.00   02/07/2024 1.24     Lab Results   Component Value Date    HGBA1C 8.4 (H) 06/06/2024    HGBA1C 6.8 (H) 03/18/2024    HGBA1C 8.4 (H) 02/26/2024     Lab Results   Component Value Date    LDLCALC 132 (H) 06/06/2024    CREATININE 1.00 06/06/2024     Lab Results   Component Value Date    CHOL 217 (H) 06/06/2024    CHOL 166 03/18/2024    CHOL 147 12/15/2023     Lab Results   Component Value Date    HDL 52.6 06/06/2024    HDL 44.9 03/18/2024    HDL 57.1 12/15/2023     Lab Results   Component Value Date    LDLCALC 132 (H) 06/06/2024    LDLCALC 90 03/18/2024    LDLCALC 76 12/15/2023     Lab Results   Component Value Date    TRIG 161 (H) 06/06/2024    TRIG 154 (H) 03/18/2024    TRIG 72 12/15/2023     No components found for: \"CHOLHDL\"  Lab " Results   Component Value Date    LDLCALC 132 (H) 06/06/2024       DIABETES ASSESSMENT    CURRENT PHARMACOTHERAPY  - glimepiride 4 mg daily  - metformin 1000 mg daily (sometimes takes in the evening if remembers)  - Steglatro 15 mg daily   - Rybelsus 7 mg daily    SECONDARY PREVENTION  - Statin? Yes - rosuvastatin 10 mg  - ACE-I/ARB? Yes - lisinopril 10 mg  - Aspirin? Yes  - Wears glasses (has dry eyes and uses eye drops); yearly check-ins  - No podiatrist or endocrinologist; checks feet daily    HISTORICAL PHARMACOTHERAPY  - Jardiance (expensive)    SMBG MEASUREMENTS  Accu-Chek glucometer; been checking every 3 days  - No exact readings provided at time of appt.    Patient does not report symptoms of hypoglycemia.  Patient does report symptoms of hyperglycemia. Patient reportsexcessive thirst and polyuria.    RECOMMENDATIONS/PLAN  1. Patients diabetes is worsening with most recent A1c of 8.4% (goal < 7 %).   - Continue all meds under the continuation of care with the referring provider and clinical pharmacy team.    2. Education:   - Will plan to continue current therapy today  - Patient confirmed follow-up with PCP  - Has enough medication for another month, with refills  - Advised patient to begin recording blood glucose readings to discuss at follow-up  - Counseled patient on MOA, expectations, side effects, duration of therapy, contraindications, administration, and monitoring parameters  - Answered all patient questions and concerns; provided [facility] phone number if issues/questions arise    Clinical Pharmacist follow up: 9/17/2024 @ 2 pm    Thank you,  Jo-Ann Silva, Jacoby    Verbal consent to manage patient's drug therapy was obtained from patient. They were informed they may decline to participate or withdraw from participation in pharmacy services at any time.

## 2024-09-04 ENCOUNTER — LAB (OUTPATIENT)
Dept: LAB | Facility: LAB | Age: 64
End: 2024-09-04
Payer: COMMERCIAL

## 2024-09-04 DIAGNOSIS — I10 PRIMARY HYPERTENSION: ICD-10-CM

## 2024-09-04 DIAGNOSIS — E11.29 TYPE 2 DIABETES MELLITUS WITH MICROALBUMINURIA, WITHOUT LONG-TERM CURRENT USE OF INSULIN (MULTI): ICD-10-CM

## 2024-09-04 DIAGNOSIS — E78.2 MIXED HYPERLIPIDEMIA: ICD-10-CM

## 2024-09-04 DIAGNOSIS — R80.9 TYPE 2 DIABETES MELLITUS WITH MICROALBUMINURIA, WITHOUT LONG-TERM CURRENT USE OF INSULIN (MULTI): ICD-10-CM

## 2024-09-04 LAB
ALBUMIN SERPL BCP-MCNC: 4.2 G/DL (ref 3.4–5)
ALP SERPL-CCNC: 80 U/L (ref 33–136)
ALT SERPL W P-5'-P-CCNC: 13 U/L (ref 10–52)
ANION GAP SERPL CALC-SCNC: 11 MMOL/L (ref 10–20)
AST SERPL W P-5'-P-CCNC: 13 U/L (ref 9–39)
BASOPHILS # BLD AUTO: 0.03 X10*3/UL (ref 0–0.1)
BASOPHILS NFR BLD AUTO: 0.6 %
BILIRUB SERPL-MCNC: 0.4 MG/DL (ref 0–1.2)
BUN SERPL-MCNC: 25 MG/DL (ref 6–23)
CALCIUM SERPL-MCNC: 9.6 MG/DL (ref 8.6–10.3)
CHLORIDE SERPL-SCNC: 104 MMOL/L (ref 98–107)
CHOLEST SERPL-MCNC: 151 MG/DL (ref 0–199)
CHOLESTEROL/HDL RATIO: 2.7
CO2 SERPL-SCNC: 28 MMOL/L (ref 21–32)
CREAT SERPL-MCNC: 1.17 MG/DL (ref 0.5–1.3)
EGFRCR SERPLBLD CKD-EPI 2021: 70 ML/MIN/1.73M*2
EOSINOPHIL # BLD AUTO: 0.06 X10*3/UL (ref 0–0.7)
EOSINOPHIL NFR BLD AUTO: 1.2 %
ERYTHROCYTE [DISTWIDTH] IN BLOOD BY AUTOMATED COUNT: 14.2 % (ref 11.5–14.5)
EST. AVERAGE GLUCOSE BLD GHB EST-MCNC: 255 MG/DL
GLUCOSE SERPL-MCNC: 153 MG/DL (ref 74–99)
HBA1C MFR BLD: 10.5 %
HCT VFR BLD AUTO: 44 % (ref 41–52)
HDLC SERPL-MCNC: 55.8 MG/DL
HGB BLD-MCNC: 14.2 G/DL (ref 13.5–17.5)
IMM GRANULOCYTES # BLD AUTO: 0.01 X10*3/UL (ref 0–0.7)
IMM GRANULOCYTES NFR BLD AUTO: 0.2 % (ref 0–0.9)
LDLC SERPL CALC-MCNC: 72 MG/DL
LYMPHOCYTES # BLD AUTO: 1.18 X10*3/UL (ref 1.2–4.8)
LYMPHOCYTES NFR BLD AUTO: 23.3 %
MAGNESIUM SERPL-MCNC: 1.81 MG/DL (ref 1.6–2.4)
MCH RBC QN AUTO: 27.5 PG (ref 26–34)
MCHC RBC AUTO-ENTMCNC: 32.3 G/DL (ref 32–36)
MCV RBC AUTO: 85 FL (ref 80–100)
MONOCYTES # BLD AUTO: 0.33 X10*3/UL (ref 0.1–1)
MONOCYTES NFR BLD AUTO: 6.5 %
NEUTROPHILS # BLD AUTO: 3.45 X10*3/UL (ref 1.2–7.7)
NEUTROPHILS NFR BLD AUTO: 68.2 %
NON HDL CHOLESTEROL: 95 MG/DL (ref 0–149)
NRBC BLD-RTO: 0 /100 WBCS (ref 0–0)
PLATELET # BLD AUTO: 159 X10*3/UL (ref 150–450)
POTASSIUM SERPL-SCNC: 4.5 MMOL/L (ref 3.5–5.3)
PROT SERPL-MCNC: 6.6 G/DL (ref 6.4–8.2)
RBC # BLD AUTO: 5.16 X10*6/UL (ref 4.5–5.9)
SODIUM SERPL-SCNC: 138 MMOL/L (ref 136–145)
TRIGL SERPL-MCNC: 118 MG/DL (ref 0–149)
TSH SERPL-ACNC: 3.16 MIU/L (ref 0.44–3.98)
VLDL: 24 MG/DL (ref 0–40)
WBC # BLD AUTO: 5.1 X10*3/UL (ref 4.4–11.3)

## 2024-09-04 PROCEDURE — 84443 ASSAY THYROID STIM HORMONE: CPT

## 2024-09-04 PROCEDURE — 80061 LIPID PANEL: CPT

## 2024-09-04 PROCEDURE — 80053 COMPREHEN METABOLIC PANEL: CPT

## 2024-09-04 PROCEDURE — 83735 ASSAY OF MAGNESIUM: CPT

## 2024-09-04 PROCEDURE — 83036 HEMOGLOBIN GLYCOSYLATED A1C: CPT

## 2024-09-04 PROCEDURE — 85025 COMPLETE CBC W/AUTO DIFF WBC: CPT

## 2024-09-10 ENCOUNTER — APPOINTMENT (OUTPATIENT)
Dept: PRIMARY CARE | Facility: CLINIC | Age: 64
End: 2024-09-10
Payer: COMMERCIAL

## 2024-09-10 VITALS
DIASTOLIC BLOOD PRESSURE: 83 MMHG | BODY MASS INDEX: 25.18 KG/M2 | HEART RATE: 71 BPM | RESPIRATION RATE: 16 BRPM | HEIGHT: 69 IN | TEMPERATURE: 97.7 F | OXYGEN SATURATION: 97 % | WEIGHT: 170 LBS | SYSTOLIC BLOOD PRESSURE: 131 MMHG

## 2024-09-10 DIAGNOSIS — I10 PRIMARY HYPERTENSION: ICD-10-CM

## 2024-09-10 DIAGNOSIS — E78.2 MIXED HYPERLIPIDEMIA: ICD-10-CM

## 2024-09-10 DIAGNOSIS — R80.9 TYPE 2 DIABETES MELLITUS WITH MICROALBUMINURIA, WITHOUT LONG-TERM CURRENT USE OF INSULIN (MULTI): Primary | ICD-10-CM

## 2024-09-10 DIAGNOSIS — K64.9 HEMORRHOIDS, UNSPECIFIED HEMORRHOID TYPE: ICD-10-CM

## 2024-09-10 DIAGNOSIS — E11.29 TYPE 2 DIABETES MELLITUS WITH MICROALBUMINURIA, WITHOUT LONG-TERM CURRENT USE OF INSULIN (MULTI): Primary | ICD-10-CM

## 2024-09-10 PROCEDURE — 3075F SYST BP GE 130 - 139MM HG: CPT | Performed by: FAMILY MEDICINE

## 2024-09-10 PROCEDURE — 99214 OFFICE O/P EST MOD 30 MIN: CPT | Performed by: FAMILY MEDICINE

## 2024-09-10 PROCEDURE — 3079F DIAST BP 80-89 MM HG: CPT | Performed by: FAMILY MEDICINE

## 2024-09-10 PROCEDURE — 3048F LDL-C <100 MG/DL: CPT | Performed by: FAMILY MEDICINE

## 2024-09-10 PROCEDURE — 4010F ACE/ARB THERAPY RXD/TAKEN: CPT | Performed by: FAMILY MEDICINE

## 2024-09-10 PROCEDURE — 3046F HEMOGLOBIN A1C LEVEL >9.0%: CPT | Performed by: FAMILY MEDICINE

## 2024-09-10 PROCEDURE — 1036F TOBACCO NON-USER: CPT | Performed by: FAMILY MEDICINE

## 2024-09-10 PROCEDURE — 3060F POS MICROALBUMINURIA REV: CPT | Performed by: FAMILY MEDICINE

## 2024-09-10 PROCEDURE — 3008F BODY MASS INDEX DOCD: CPT | Performed by: FAMILY MEDICINE

## 2024-09-10 RX ORDER — HYDROCORTISONE 25 MG/G
CREAM TOPICAL 4 TIMES DAILY PRN
Qty: 30 G | Refills: 11 | Status: SHIPPED | OUTPATIENT
Start: 2024-09-10 | End: 2025-09-10

## 2024-09-10 RX ORDER — ATORVASTATIN CALCIUM 40 MG/1
40 TABLET, FILM COATED ORAL
COMMUNITY
Start: 2024-08-28 | End: 2024-09-10 | Stop reason: ALTCHOICE

## 2024-09-10 RX ORDER — ROSUVASTATIN CALCIUM 40 MG/1
40 TABLET, COATED ORAL DAILY
Qty: 90 TABLET | Refills: 3 | Status: SHIPPED | OUTPATIENT
Start: 2024-09-10 | End: 2025-09-10

## 2024-09-10 RX ORDER — METFORMIN HYDROCHLORIDE 1000 MG/1
1000 TABLET ORAL 2 TIMES DAILY
Qty: 180 TABLET | Refills: 3 | Status: SHIPPED | OUTPATIENT
Start: 2024-09-10 | End: 2025-09-10

## 2024-09-10 RX ORDER — METOPROLOL SUCCINATE 50 MG/1
50 TABLET, EXTENDED RELEASE ORAL
COMMUNITY
Start: 2024-08-28

## 2024-09-10 ASSESSMENT — ENCOUNTER SYMPTOMS
PHOTOPHOBIA: 0
SLEEP DISTURBANCE: 0
DIARRHEA: 0
FREQUENCY: 0
NERVOUS/ANXIOUS: 0
NEUROLOGIC COMPLAINT: 1
SINUS PRESSURE: 0
CHOKING: 0
ADENOPATHY: 0
DYSURIA: 0
APPETITE CHANGE: 0
SORE THROAT: 0
TREMORS: 0
EYE DISCHARGE: 0
HALLUCINATIONS: 0
ABDOMINAL DISTENTION: 0
BLOOD IN STOOL: 0
NECK STIFFNESS: 0
UNEXPECTED WEIGHT CHANGE: 0
ARTHRALGIAS: 1
DYSPHORIC MOOD: 0
COUGH: 0
TROUBLE SWALLOWING: 0
RHINORRHEA: 0
CHEST TIGHTNESS: 0
MYALGIAS: 0
EYE PAIN: 0
SEIZURES: 0
POLYDIPSIA: 0
AGITATION: 0
EYE ITCHING: 0
ACTIVITY CHANGE: 0
FLANK PAIN: 0
BRUISES/BLEEDS EASILY: 0
NUMBNESS: 0
WOUND: 0
WHEEZING: 0
SPEECH DIFFICULTY: 0
EYE REDNESS: 0
HYPERTENSION: 1
JOINT SWELLING: 0
FACIAL ASYMMETRY: 0
CONSTIPATION: 0
CHILLS: 0
VOICE CHANGE: 0
HEMATURIA: 0

## 2024-09-10 NOTE — PROGRESS NOTES
Subjective   Patient ID: Melchor Bergeron is a 64 y.o. male who presents for 3 month check up (And review labs ) and Numbness (In both feel x 1 week ).        Diabetes  He presents for his follow-up diabetic visit. He has type 2 diabetes mellitus. His disease course has been worsening. Pertinent negatives for hypoglycemia include no nervousness/anxiousness, seizures, speech difficulty or tremors. Pertinent negatives for diabetes include no foot paresthesias, no polydipsia and no polyuria. Symptoms are stable. Risk factors for coronary artery disease include diabetes mellitus, dyslipidemia, hypertension and male sex. Current diabetic treatment includes oral agent (triple therapy). He is compliant with treatment none of the time. His weight is stable. He is following a generally healthy diet. When asked about meal planning, he reported none. He has not had a previous visit with a dietitian. He participates in exercise intermittently. His home blood glucose trend is increasing rapidly. An ACE inhibitor/angiotensin II receptor blocker is being taken.   Hypertension  This is a chronic problem. The current episode started more than 1 year ago. The problem is unchanged. The problem is controlled. Associated symptoms include malaise/fatigue. There are no associated agents to hypertension. Risk factors for coronary artery disease include diabetes mellitus, dyslipidemia and male gender. Past treatments include ACE inhibitors. The current treatment provides significant improvement. There is no history of a hypertension causing med or a thyroid problem.   Hyperlipidemia  This is a chronic problem. The current episode started more than 1 year ago. The problem is controlled. Recent lipid tests were reviewed and are variable. Exacerbating diseases include diabetes. Pertinent negatives include no myalgias. Current antihyperlipidemic treatment includes statins. The current treatment provides significant improvement of lipids. There are  "no compliance problems.         Review of Systems   Constitutional:  Positive for malaise/fatigue. Negative for activity change, appetite change, chills and unexpected weight change.   HENT:  Negative for congestion, ear pain, hearing loss, nosebleeds, postnasal drip, rhinorrhea, sinus pressure, sneezing, sore throat, tinnitus, trouble swallowing and voice change.    Eyes:  Negative for photophobia, pain, discharge, redness, itching and visual disturbance.   Respiratory:  Negative for cough, choking, chest tightness and wheezing.    Cardiovascular:  Negative for leg swelling.   Gastrointestinal:  Negative for abdominal distention, blood in stool, constipation and diarrhea.   Endocrine: Negative for cold intolerance, heat intolerance, polydipsia and polyuria.   Genitourinary:  Negative for dysuria, flank pain, frequency, hematuria and urgency.   Musculoskeletal:  Positive for arthralgias. Negative for joint swelling, myalgias and neck stiffness.   Skin:  Negative for rash and wound.   Allergic/Immunologic: Negative for immunocompromised state.   Neurological:  Negative for tremors, seizures, facial asymmetry, speech difficulty and numbness.   Hematological:  Negative for adenopathy. Does not bruise/bleed easily.   Psychiatric/Behavioral:  Negative for agitation, behavioral problems, dysphoric mood, hallucinations, self-injury, sleep disturbance and suicidal ideas. The patient is not nervous/anxious.        Objective   /83 (BP Location: Left arm, Patient Position: Sitting, BP Cuff Size: Large adult)   Pulse 71   Temp 36.5 °C (97.7 °F) (Temporal)   Resp 16   Ht 1.753 m (5' 9\")   Wt 77.1 kg (170 lb)   SpO2 97%   BMI 25.10 kg/m²     Physical Exam  Constitutional:       General: He is not in acute distress.     Appearance: He is not ill-appearing or diaphoretic.   HENT:      Head: Normocephalic and atraumatic.      Right Ear: External ear normal.      Left Ear: External ear normal.      Nose: Nose normal. No " rhinorrhea.   Eyes:      General: Lids are normal. No scleral icterus.        Right eye: No discharge.         Left eye: No discharge.      Conjunctiva/sclera: Conjunctivae normal.   Cardiovascular:      Rate and Rhythm: Normal rate and regular rhythm.      Pulses: Normal pulses.      Heart sounds: No murmur heard.  Pulmonary:      Effort: Pulmonary effort is normal. No respiratory distress.      Breath sounds: No decreased breath sounds, wheezing, rhonchi or rales.   Abdominal:      General: Bowel sounds are normal. There is no distension.      Palpations: Abdomen is soft. There is no mass.      Tenderness: There is no abdominal tenderness. There is no guarding or rebound.   Musculoskeletal:         General: No swelling or tenderness.      Cervical back: No rigidity or tenderness.      Right lower leg: No edema.      Left lower leg: No edema.      Comments: Diffuse arthritic deformities noted   Lymphadenopathy:      Cervical: No cervical adenopathy.      Upper Body:      Right upper body: No supraclavicular adenopathy.      Left upper body: No supraclavicular adenopathy.   Skin:     General: Skin is warm and dry.      Coloration: Skin is not jaundiced or pale.      Findings: No erythema, lesion or rash.   Neurological:      General: No focal deficit present.      Mental Status: He is alert and oriented to person, place, and time.      Sensory: No sensory deficit.      Motor: No weakness or tremor.      Coordination: Coordination normal.      Gait: Gait normal.   Psychiatric:         Mood and Affect: Mood normal. Affect is not inappropriate.         Behavior: Behavior normal.         Assessment/Plan   Diagnoses and all orders for this visit:  Type 2 diabetes mellitus with microalbuminuria, without long-term current use of insulin (Multi)  -     rosuvastatin (Crestor) 40 mg tablet; Take 1 tablet (40 mg) by mouth once daily.  -     semaglutide (Rybelsus) 14 mg tablet tablet; Take 1 tablet (14 mg) by mouth once  daily.  -     metFORMIN (Glucophage) 1,000 mg tablet; Take 1 tablet (1,000 mg) by mouth 2 times a day. With meals  -     Follow Up In [facility] - PCP - Established; Future  -     Albumin-Creatinine Ratio, Urine Random; Future  -     CBC and Auto Differential; Future  -     Comprehensive Metabolic Panel; Future  -     Hemoglobin A1C; Future  -     Lipid Panel; Future  -     Magnesium; Future  -     TSH with reflex to Free T4 if abnormal; Future  Mixed hyperlipidemia  -     rosuvastatin (Crestor) 40 mg tablet; Take 1 tablet (40 mg) by mouth once daily.  -     Follow Up In [facility] - PCP - Established; Future  -     Comprehensive Metabolic Panel; Future  -     Lipid Panel; Future  -     TSH with reflex to Free T4 if abnormal; Future  Hemorrhoids, unspecified hemorrhoid type  -     hydrocortisone (Anusol-HC) 2.5 % rectal cream; Insert into the rectum 4 times a day as needed for hemorrhoids (rectal discomfort).  -     Follow Up In [facility] - PCP - Established; Future  Primary hypertension  -     Follow Up In [facility] - PCP - Established; Future  -     Albumin-Creatinine Ratio, Urine Random; Future  -     CBC and Auto Differential; Future  -     Comprehensive Metabolic Panel; Future  -     Hemoglobin A1C; Future  -     Lipid Panel; Future  -     Magnesium; Future  -     TSH with reflex to Free T4 if abnormal; Future

## 2024-09-17 ENCOUNTER — APPOINTMENT (OUTPATIENT)
Dept: PHARMACY | Facility: HOSPITAL | Age: 64
End: 2024-09-17
Payer: COMMERCIAL

## 2024-09-17 DIAGNOSIS — K64.9 HEMORRHOIDS, UNSPECIFIED HEMORRHOID TYPE: ICD-10-CM

## 2024-09-17 DIAGNOSIS — E11.29 TYPE 2 DIABETES MELLITUS WITH MICROALBUMINURIA, WITHOUT LONG-TERM CURRENT USE OF INSULIN (MULTI): ICD-10-CM

## 2024-09-17 DIAGNOSIS — R80.9 TYPE 2 DIABETES MELLITUS WITH MICROALBUMINURIA, WITHOUT LONG-TERM CURRENT USE OF INSULIN (MULTI): ICD-10-CM

## 2024-09-17 RX ORDER — HYDROCORTISONE 25 MG/G
CREAM TOPICAL 4 TIMES DAILY PRN
Qty: 30 G | Refills: 11 | Status: SHIPPED | OUTPATIENT
Start: 2024-09-17 | End: 2025-09-17

## 2024-09-17 NOTE — Clinical Note
Pended rectal cream to you for another pharmacy, as patient having difficulties with [facility]. Thank you!

## 2024-09-17 NOTE — PROGRESS NOTES
Clinical Pharmacy Appointment    Patient ID: Melchor Bergeron is a 64 y.o. male who presents for Diabetes.    Pt is here for Follow Up appointment.     Referring Provider: Alex Britt DO  PCP: Alex Britt DO   Last visit with PCP: 9/10/24   Next visit with PCP: 12/10/24      Subjective   Telehealth appointment with patient. Pt endorses he would like his corticosteroid cream for 8 days sent to [facility] in [place]. Pt confirms that [facility] had an issue with filling it. Pt was taking two medications for cholesterol rosuvastatin, atorvastatin was changed. Pt is back on aspirin, Eliquis, pt has issues with shaving small cuts other than that no bleeding. Rybelsus 14 mg po every day.     Interval History  Rybelsus increased from 7 mg po every day to 14 mg po every day  Pt was started on Aspirin 81 mg po every day  Pt was started on rosuvastatin  Pt discontinued atorvastatin    HPI  DIABETES MELLITUS TYPE 2:    Diagnosed with diabetes:  12/19/23 DM with microalbuminuria. Known diabetic complications: microalbuminuria.  Does patient follow with Endocrinology: Not at this time  [facility]  Most recent visit in Podiatry: unknown-- patient denies sores or cuts on feet today      Current diabetic medications include:  Rybelsus 14 mg po every day  Metformin 1000 mg po BID  Steglatro 15 mg daily    Clarifications to above regimen: none  Adverse Effects: none reported    Glucose Readings:  Glucometer/CGM Type: Accu-Chek  Patient tests BG less than one time per day    Current home BG readings: pt doesn't have any to report today.     Any episodes of hypoglycemia? No,   .  Did patient treat episode of hypoglycemia appropriately? N/A  Does the patient have a prescription for ready-to-use Glucagon? Not on insulin  Does pt have proteinuria? No    Secondary Prevention:  Statin? Yes  ACE-I/ARB? Yes  Aspirin? Yes    Pertinent PMH Review:  PMH of Pancreatitis: No  PMH of Retinopathy: No  PMH of  Urinary Tract Infections: No  PMH of MTC: No    Drug Interactions  No relevant drug interactions were noted.    Medication System Management  Patients preferred pharmacy: [facility]    Objective   Allergies   Allergen Reactions    Sulfa (Sulfonamide Antibiotics) Hives and Rash    Oxycodone Hallucinations    Metformin Diarrhea     Social History     Social History Narrative    Not on file      Medication Review  Current Outpatient Medications   Medication Instructions    amoxicillin (AMOXIL) 875 mg, oral, Daily    apixaban (ELIQUIS) 5 mg, oral, 2 times daily    aspirin 81 mg, oral, Daily    cyclobenzaprine (FLEXERIL) 10 mg, oral, 2 times daily PRN    DULoxetine (CYMBALTA) 30 mg, oral, Daily, Do not crush or chew.    ertugliflozin (STEGLATRO) 15 mg, oral, Daily    famotidine (PEPCID) 20 mg, oral, 2 times daily    glimepiride (AMARYL) 4 mg, oral, Daily before breakfast    hydrocortisone (Anusol-HC) 2.5 % rectal cream rectal, 4 times daily PRN    lisinopril 10 mg, oral, Daily    metFORMIN (GLUCOPHAGE) 1,000 mg, oral, 2 times daily, With meals    metoprolol succinate XL (TOPROL-XL) 50 mg, oral, Daily RT    rosuvastatin (CRESTOR) 40 mg, oral, Daily    semaglutide (RYBELSUS) 14 mg, oral, Daily      Vitals  BP Readings from Last 2 Encounters:   09/10/24 131/83   06/11/24 125/74     BMI Readings from Last 1 Encounters:   09/10/24 25.10 kg/m²      Labs  A1C  Lab Results   Component Value Date    HGBA1C 10.5 (H) 09/04/2024    HGBA1C 8.4 (H) 06/06/2024    HGBA1C 6.8 (H) 03/18/2024     BMP  Lab Results   Component Value Date    CALCIUM 9.6 09/04/2024     09/04/2024    K 4.5 09/04/2024    CO2 28 09/04/2024     09/04/2024    BUN 25 (H) 09/04/2024    CREATININE 1.17 09/04/2024    EGFR 70 09/04/2024     LFTs  Lab Results   Component Value Date    ALT 13 09/04/2024    AST 13 09/04/2024    ALKPHOS 80 09/04/2024    BILITOT 0.4 09/04/2024     FLP  Lab Results   Component Value Date    TRIG 118 09/04/2024    CHOL 151  09/04/2024    LDLF 134 (H) 12/04/2020    LDLCALC 72 09/04/2024    HDL 55.8 09/04/2024     Urine Microalbumin  Lab Results   Component Value Date    MICROALBCREA 93.1 (H) 03/18/2024     Weight Management  Wt Readings from Last 3 Encounters:   09/10/24 77.1 kg (170 lb)   06/11/24 71.2 kg (157 lb)   05/01/24 64 kg (141 lb)      There is no height or weight on file to calculate BMI.     Assessment/Plan   Problem List Items Addressed This Visit       Type 2 diabetes mellitus with microalbuminuria, without long-term current use of insulin (Multi)     Patient's goal A1c is < 6%.  Is pt at goal? No, 10.5 % on 9/4/24  Patient's SMBGs are pt does not have any to report today.     Rationale for plan: Have patient check blood sugar more often. Rybelsus  was increased from  7mg daily to 14 mg daily at his most recent visit with Dr. Britt. Will have patient continue to take 14 mg of Rybelsus daily until we check in with him in about 4 weeks.     Medication Changes:  CONTINUE:  Metformin 1000 mg po BID  Rybelsus 14 mg PO daily  Steglatro 15 mg daily    Future Considerations:  Insulin  Reviewed possibility of insulin in the future; patient reluctant to start today    Monitoring and Education:  Encouraged patient to test blood glucose regularly and will discuss at follow-up  Counseled patient on MOA, expectations, side effects, duration of therapy, contraindications, administration, and monitoring parameters  Answered all patient questions and concerns; provided [facility] phone number if issues/questions arise         Relevant Orders    Follow Up In [facility] - Pharmacy     Clinical Pharmacist follow-up: 10/22/24 at 2:30 PM, Telehealth visit    Continue all meds under the continuation of care with the referring provider and clinical pharmacy team.    Thank you,  Rupert Ruelas  Clinical Pharmacist  [phone]    Verbal consent to manage patient's drug therapy was obtained from the patient. They were informed they may  decline to participate or withdraw from participation in pharmacy services at any time.

## 2024-09-17 NOTE — ASSESSMENT & PLAN NOTE
Patient's goal A1c is < 6%.  Is pt at goal? No, 10.5 % on 9/4/24  Patient's SMBGs are pt does not have any to report today.     Rationale for plan: Have patient check blood sugar more often. Rybelsus  was increased from  7mg daily to 14 mg daily at his most recent visit with Dr. Britt. Will have patient continue to take 14 mg of Rybelsus daily until we check in with him in about 4 weeks.     Medication Changes:  CONTINUE:  Metformin 1000 mg po BID  Rybelsus 14 mg PO daily  Steglatro 15 mg daily    Future Considerations:  Insulin  Reviewed possibility of insulin in the future; patient reluctant to start today    Monitoring and Education:  Encouraged patient to test blood glucose regularly and will discuss at follow-up  Counseled patient on MOA, expectations, side effects, duration of therapy, contraindications, administration, and monitoring parameters  Answered all patient questions and concerns; provided [facility] phone number if issues/questions arise

## 2024-10-22 ENCOUNTER — APPOINTMENT (OUTPATIENT)
Dept: PHARMACY | Facility: HOSPITAL | Age: 64
End: 2024-10-22
Payer: COMMERCIAL

## 2024-10-22 DIAGNOSIS — E11.29 TYPE 2 DIABETES MELLITUS WITH MICROALBUMINURIA, WITHOUT LONG-TERM CURRENT USE OF INSULIN (MULTI): ICD-10-CM

## 2024-10-22 DIAGNOSIS — R80.9 TYPE 2 DIABETES MELLITUS WITH MICROALBUMINURIA, WITHOUT LONG-TERM CURRENT USE OF INSULIN (MULTI): ICD-10-CM

## 2024-10-22 NOTE — ASSESSMENT & PLAN NOTE
Patient's goal A1c is < 6%.  Is pt at goal? No, 10.5 % on 9/4/24  Patient's SMBGs are mostly at goal. Patient has been trying to endorse in healthier food options, and limit carb intake.  Rationale for plan: Will plan to continue current regimen today. Depending on labs in Dec. 2024, insulin may be warranted.    Medication Changes:  CONTINUE:  Metformin 1000 mg po BID  Rybelsus 14 mg PO daily  Steglatro 15 mg daily    Future Considerations:  Insulin  Reviewed possibility of insulin in the future    Monitoring and Education:  Encouraged patient to test blood glucose regularly and will discuss at follow-up  Counseled patient on MOA, expectations, side effects, duration of therapy, contraindications, administration, and monitoring parameters  Answered all patient questions and concerns; provided [facility] phone number if issues/questions arise

## 2024-10-22 NOTE — PROGRESS NOTES
Clinical Pharmacy Appointment    Patient ID: Melchor Bergeron is a 64 y.o. male who presents for Diabetes.    Pt is here for Follow Up appointment.     Referring Provider: Alex Britt DO  PCP: Alex Britt DO   Last visit with PCP: 9/10/24   Next visit with PCP: 12/10/24    Subjective   Interval History  Trying to watch diet  Endorsing in more greens lately  Reports fasting readings mostly at goal  Today: 124 mg/dL  Confirmed follow-up with PCP in Dec. 2024  Received labs at [facility] today    HPI  DIABETES MELLITUS TYPE 2:    Diagnosed with diabetes:  12/19/23 DM with microalbuminuria. Known diabetic complications: microalbuminuria.  Does patient follow with Endocrinology: Not at this time  Last optometry exam: ukw  Most recent visit in Podiatry: unknown-- patient denies sores or cuts on feet today      Current diabetic medications include:  Rybelsus 14 mg po every day  Metformin 1000 mg po BID  Steglatro 15 mg daily    Clarifications to above regimen: none  Adverse Effects: none reported    Glucose Readings:  Glucometer/CGM Type: Accu-Chek  Patient tests BG less than one time per day    Current home BG readings: pt doesn't have any to report today.     Any episodes of hypoglycemia? No, patient denies .  Did patient treat episode of hypoglycemia appropriately? N/A  Does the patient have a prescription for ready-to-use Glucagon? Not on insulin  Does pt have proteinuria? No    Secondary Prevention:  Statin? Yes  ACE-I/ARB? Yes  Aspirin? Yes    Pertinent PMH Review:  PMH of Pancreatitis: No  PMH of Retinopathy: No  PMH of Urinary Tract Infections: No  PMH of MTC: No    Drug Interactions  No relevant drug interactions were noted.    Medication System Management  Patients preferred pharmacy: [facility]    Objective   Allergies   Allergen Reactions    Sulfa (Sulfonamide Antibiotics) Hives and Rash    Oxycodone Hallucinations    Metformin Diarrhea     Social History     Social History Narrative    Not  on file      Medication Review  Current Outpatient Medications   Medication Instructions    amoxicillin (AMOXIL) 875 mg, oral, Daily    apixaban (ELIQUIS) 5 mg, oral, 2 times daily    aspirin 81 mg, oral, Daily    cyclobenzaprine (FLEXERIL) 10 mg, oral, 2 times daily PRN    DULoxetine (CYMBALTA) 30 mg, oral, Daily, Do not crush or chew.    ertugliflozin (STEGLATRO) 15 mg, oral, Daily    famotidine (PEPCID) 20 mg, oral, 2 times daily    glimepiride (AMARYL) 4 mg, oral, Daily before breakfast    hydrocortisone (Anusol-HC) 2.5 % rectal cream rectal, 4 times daily PRN    lisinopril 10 mg, oral, Daily    metFORMIN (GLUCOPHAGE) 1,000 mg, oral, 2 times daily, With meals    metoprolol succinate XL (TOPROL-XL) 50 mg, oral, Daily RT    rosuvastatin (CRESTOR) 40 mg, oral, Daily    semaglutide (RYBELSUS) 14 mg, oral, Daily      Vitals  BP Readings from Last 2 Encounters:   09/10/24 131/83   06/11/24 125/74     BMI Readings from Last 1 Encounters:   09/10/24 25.10 kg/m²      Labs  A1C  Lab Results   Component Value Date    HGBA1C 10.5 (H) 09/04/2024    HGBA1C 8.4 (H) 06/06/2024    HGBA1C 6.8 (H) 03/18/2024     BMP  Lab Results   Component Value Date    CALCIUM 9.6 09/04/2024     09/04/2024    K 4.5 09/04/2024    CO2 28 09/04/2024     09/04/2024    BUN 25 (H) 09/04/2024    CREATININE 1.17 09/04/2024    EGFR 70 09/04/2024     LFTs  Lab Results   Component Value Date    ALT 13 09/04/2024    AST 13 09/04/2024    ALKPHOS 80 09/04/2024    BILITOT 0.4 09/04/2024     FLP  Lab Results   Component Value Date    TRIG 118 09/04/2024    CHOL 151 09/04/2024    LDLF 134 (H) 12/04/2020    LDLCALC 72 09/04/2024    HDL 55.8 09/04/2024     Urine Microalbumin  Lab Results   Component Value Date    MICROALBCREA 93.1 (H) 03/18/2024     Weight Management  Wt Readings from Last 3 Encounters:   09/10/24 77.1 kg (170 lb)   06/11/24 71.2 kg (157 lb)   05/01/24 64 kg (141 lb)      There is no height or weight on file to calculate BMI.      Assessment/Plan   Problem List Items Addressed This Visit       Type 2 diabetes mellitus with microalbuminuria, without long-term current use of insulin (Multi)     Patient's goal A1c is < 6%.  Is pt at goal? No, 10.5 % on 9/4/24  Patient's SMBGs are mostly at goal. Patient has been trying to endorse in healthier food options, and limit carb intake.  Rationale for plan: Will plan to continue current regimen today. Depending on labs in Dec. 2024, insulin may be warranted.    Medication Changes:  CONTINUE:  Metformin 1000 mg po BID  Rybelsus 14 mg PO daily  Steglatro 15 mg daily    Future Considerations:  Insulin  Reviewed possibility of insulin in the future    Monitoring and Education:  Encouraged patient to test blood glucose regularly and will discuss at follow-up  Counseled patient on MOA, expectations, side effects, duration of therapy, contraindications, administration, and monitoring parameters  Answered all patient questions and concerns; provided [facility] phone number if issues/questions arise          Clinical Pharmacist follow-up: 6 weeks, Telehealth visit    Continue all meds under the continuation of care with the referring provider and clinical pharmacy team.    Thank you,  Jo-Ann Silva, PharmD  Clinical Pharmacist  [phone]    Verbal consent to manage patient's drug therapy was obtained from the patient. They were informed they may decline to participate or withdraw from participation in pharmacy services at any time.

## 2024-11-15 ENCOUNTER — LAB (OUTPATIENT)
Dept: LAB | Facility: LAB | Age: 64
End: 2024-11-15
Payer: COMMERCIAL

## 2024-11-15 DIAGNOSIS — I10 PRIMARY HYPERTENSION: ICD-10-CM

## 2024-11-15 DIAGNOSIS — E11.29 TYPE 2 DIABETES MELLITUS WITH MICROALBUMINURIA, WITHOUT LONG-TERM CURRENT USE OF INSULIN (MULTI): ICD-10-CM

## 2024-11-15 DIAGNOSIS — E78.2 MIXED HYPERLIPIDEMIA: ICD-10-CM

## 2024-11-15 DIAGNOSIS — R80.9 TYPE 2 DIABETES MELLITUS WITH MICROALBUMINURIA, WITHOUT LONG-TERM CURRENT USE OF INSULIN (MULTI): ICD-10-CM

## 2024-11-15 LAB
ALBUMIN SERPL BCP-MCNC: 4.3 G/DL (ref 3.4–5)
ALP SERPL-CCNC: 70 U/L (ref 33–136)
ALT SERPL W P-5'-P-CCNC: 19 U/L (ref 10–52)
ANION GAP SERPL CALC-SCNC: 12 MMOL/L (ref 10–20)
AST SERPL W P-5'-P-CCNC: 16 U/L (ref 9–39)
BASOPHILS # BLD AUTO: 0.05 X10*3/UL (ref 0–0.1)
BASOPHILS NFR BLD AUTO: 1 %
BILIRUB SERPL-MCNC: 0.6 MG/DL (ref 0–1.2)
BUN SERPL-MCNC: 29 MG/DL (ref 6–23)
CALCIUM SERPL-MCNC: 10.2 MG/DL (ref 8.6–10.3)
CHLORIDE SERPL-SCNC: 102 MMOL/L (ref 98–107)
CHOLEST SERPL-MCNC: 110 MG/DL (ref 0–199)
CHOLESTEROL/HDL RATIO: 2.3
CO2 SERPL-SCNC: 29 MMOL/L (ref 21–32)
CREAT SERPL-MCNC: 1.36 MG/DL (ref 0.5–1.3)
CREAT UR-MCNC: 106.2 MG/DL (ref 20–370)
EGFRCR SERPLBLD CKD-EPI 2021: 58 ML/MIN/1.73M*2
EOSINOPHIL # BLD AUTO: 0.11 X10*3/UL (ref 0–0.7)
EOSINOPHIL NFR BLD AUTO: 2.2 %
ERYTHROCYTE [DISTWIDTH] IN BLOOD BY AUTOMATED COUNT: 14.5 % (ref 11.5–14.5)
GLUCOSE SERPL-MCNC: 87 MG/DL (ref 74–99)
HCT VFR BLD AUTO: 47.8 % (ref 41–52)
HDLC SERPL-MCNC: 48.7 MG/DL
HGB BLD-MCNC: 15.1 G/DL (ref 13.5–17.5)
IMM GRANULOCYTES # BLD AUTO: 0.01 X10*3/UL (ref 0–0.7)
IMM GRANULOCYTES NFR BLD AUTO: 0.2 % (ref 0–0.9)
LDLC SERPL CALC-MCNC: 46 MG/DL
LYMPHOCYTES # BLD AUTO: 1.47 X10*3/UL (ref 1.2–4.8)
LYMPHOCYTES NFR BLD AUTO: 29.2 %
MAGNESIUM SERPL-MCNC: 1.9 MG/DL (ref 1.6–2.4)
MCH RBC QN AUTO: 28.1 PG (ref 26–34)
MCHC RBC AUTO-ENTMCNC: 31.6 G/DL (ref 32–36)
MCV RBC AUTO: 89 FL (ref 80–100)
MICROALBUMIN UR-MCNC: 96.9 MG/L
MICROALBUMIN/CREAT UR: 91.2 UG/MG CREAT
MONOCYTES # BLD AUTO: 0.32 X10*3/UL (ref 0.1–1)
MONOCYTES NFR BLD AUTO: 6.4 %
NEUTROPHILS # BLD AUTO: 3.07 X10*3/UL (ref 1.2–7.7)
NEUTROPHILS NFR BLD AUTO: 61 %
NON HDL CHOLESTEROL: 61 MG/DL (ref 0–149)
NRBC BLD-RTO: 0 /100 WBCS (ref 0–0)
PLATELET # BLD AUTO: 189 X10*3/UL (ref 150–450)
POTASSIUM SERPL-SCNC: 4.9 MMOL/L (ref 3.5–5.3)
PROT SERPL-MCNC: 6.7 G/DL (ref 6.4–8.2)
RBC # BLD AUTO: 5.38 X10*6/UL (ref 4.5–5.9)
SODIUM SERPL-SCNC: 138 MMOL/L (ref 136–145)
T4 FREE SERPL-MCNC: 0.99 NG/DL (ref 0.61–1.12)
TRIGL SERPL-MCNC: 78 MG/DL (ref 0–149)
TSH SERPL-ACNC: 4.62 MIU/L (ref 0.44–3.98)
VLDL: 16 MG/DL (ref 0–40)
WBC # BLD AUTO: 5 X10*3/UL (ref 4.4–11.3)

## 2024-11-15 PROCEDURE — 82043 UR ALBUMIN QUANTITATIVE: CPT

## 2024-11-15 PROCEDURE — 83036 HEMOGLOBIN GLYCOSYLATED A1C: CPT

## 2024-11-15 PROCEDURE — 80053 COMPREHEN METABOLIC PANEL: CPT

## 2024-11-15 PROCEDURE — 83735 ASSAY OF MAGNESIUM: CPT

## 2024-11-15 PROCEDURE — 85025 COMPLETE CBC W/AUTO DIFF WBC: CPT

## 2024-11-15 PROCEDURE — 84443 ASSAY THYROID STIM HORMONE: CPT

## 2024-11-15 PROCEDURE — 80061 LIPID PANEL: CPT

## 2024-11-15 PROCEDURE — 84439 ASSAY OF FREE THYROXINE: CPT

## 2024-11-15 PROCEDURE — 82570 ASSAY OF URINE CREATININE: CPT

## 2024-11-16 LAB
EST. AVERAGE GLUCOSE BLD GHB EST-MCNC: 180 MG/DL
HBA1C MFR BLD: 7.9 %

## 2024-12-07 ASSESSMENT — ENCOUNTER SYMPTOMS
CONFUSION: 0
HUNGER: 0
HEADACHES: 0
BLACKOUTS: 0
SPEECH DIFFICULTY: 0
SWEATS: 0
TREMORS: 0
NERVOUS/ANXIOUS: 0
SEIZURES: 0
DIZZINESS: 0

## 2024-12-10 ENCOUNTER — APPOINTMENT (OUTPATIENT)
Dept: PRIMARY CARE | Facility: CLINIC | Age: 64
End: 2024-12-10
Payer: COMMERCIAL

## 2024-12-10 ENCOUNTER — TELEPHONE (OUTPATIENT)
Dept: PRIMARY CARE | Facility: CLINIC | Age: 64
End: 2024-12-10

## 2024-12-10 VITALS
WEIGHT: 177 LBS | TEMPERATURE: 96.8 F | BODY MASS INDEX: 26.22 KG/M2 | DIASTOLIC BLOOD PRESSURE: 90 MMHG | OXYGEN SATURATION: 97 % | RESPIRATION RATE: 18 BRPM | SYSTOLIC BLOOD PRESSURE: 132 MMHG | HEIGHT: 69 IN | HEART RATE: 88 BPM

## 2024-12-10 DIAGNOSIS — E11.29 TYPE 2 DIABETES MELLITUS WITH MICROALBUMINURIA, WITHOUT LONG-TERM CURRENT USE OF INSULIN (MULTI): Primary | ICD-10-CM

## 2024-12-10 DIAGNOSIS — I10 PRIMARY HYPERTENSION: ICD-10-CM

## 2024-12-10 DIAGNOSIS — E78.2 MIXED HYPERLIPIDEMIA: ICD-10-CM

## 2024-12-10 DIAGNOSIS — R20.9 COLD FEET: ICD-10-CM

## 2024-12-10 DIAGNOSIS — R80.9 TYPE 2 DIABETES MELLITUS WITH MICROALBUMINURIA, WITHOUT LONG-TERM CURRENT USE OF INSULIN (MULTI): Primary | ICD-10-CM

## 2024-12-10 PROCEDURE — 3060F POS MICROALBUMINURIA REV: CPT | Performed by: FAMILY MEDICINE

## 2024-12-10 PROCEDURE — 1036F TOBACCO NON-USER: CPT | Performed by: FAMILY MEDICINE

## 2024-12-10 PROCEDURE — 3048F LDL-C <100 MG/DL: CPT | Performed by: FAMILY MEDICINE

## 2024-12-10 PROCEDURE — 99214 OFFICE O/P EST MOD 30 MIN: CPT | Performed by: FAMILY MEDICINE

## 2024-12-10 PROCEDURE — 4010F ACE/ARB THERAPY RXD/TAKEN: CPT | Performed by: FAMILY MEDICINE

## 2024-12-10 PROCEDURE — 3075F SYST BP GE 130 - 139MM HG: CPT | Performed by: FAMILY MEDICINE

## 2024-12-10 PROCEDURE — 3008F BODY MASS INDEX DOCD: CPT | Performed by: FAMILY MEDICINE

## 2024-12-10 PROCEDURE — 3051F HG A1C>EQUAL 7.0%<8.0%: CPT | Performed by: FAMILY MEDICINE

## 2024-12-10 PROCEDURE — 3080F DIAST BP >= 90 MM HG: CPT | Performed by: FAMILY MEDICINE

## 2024-12-10 RX ORDER — DAPAGLIFLOZIN 10 MG/1
10 TABLET, FILM COATED ORAL DAILY
Qty: 30 TABLET | Refills: 11 | Status: SHIPPED | OUTPATIENT
Start: 2024-12-10 | End: 2024-12-10

## 2024-12-10 RX ORDER — DAPAGLIFLOZIN 10 MG/1
10 TABLET, FILM COATED ORAL DAILY
Qty: 90 TABLET | Refills: 3 | Status: SHIPPED | OUTPATIENT
Start: 2025-01-01 | End: 2026-01-01

## 2024-12-10 RX ORDER — EZETIMIBE 10 MG/1
10 TABLET ORAL
COMMUNITY
Start: 2024-10-30

## 2024-12-10 ASSESSMENT — ENCOUNTER SYMPTOMS
WHEEZING: 0
PHOTOPHOBIA: 0
HYPERTENSION: 1
UNEXPECTED WEIGHT CHANGE: 0
EYE ITCHING: 0
ARTHRALGIAS: 1
HALLUCINATIONS: 0
SLEEP DISTURBANCE: 0
DIARRHEA: 0
EYE PAIN: 0
TROUBLE SWALLOWING: 0
HEADACHES: 0
HEMATURIA: 0
CHILLS: 0
CONSTIPATION: 0
ABDOMINAL DISTENTION: 0
SWEATS: 0
SORE THROAT: 0
EYE DISCHARGE: 0
HUNGER: 0
NECK STIFFNESS: 0
FREQUENCY: 0
ADENOPATHY: 0
CONFUSION: 0
WOUND: 0
EYE REDNESS: 0
SPEECH DIFFICULTY: 0
APPETITE CHANGE: 0
NERVOUS/ANXIOUS: 0
DYSPHORIC MOOD: 0
VOICE CHANGE: 0
JOINT SWELLING: 0
SEIZURES: 0
POLYDIPSIA: 0
CHEST TIGHTNESS: 0
DYSURIA: 0
SINUS PRESSURE: 0
DIZZINESS: 0
FLANK PAIN: 0
CHOKING: 0
MYALGIAS: 0
AGITATION: 0
TREMORS: 0
ACTIVITY CHANGE: 0
BLACKOUTS: 0
RHINORRHEA: 0
BLOOD IN STOOL: 0
BRUISES/BLEEDS EASILY: 0
COUGH: 0
FACIAL ASYMMETRY: 0
NUMBNESS: 0

## 2024-12-10 NOTE — PROGRESS NOTES
Subjective   Patient ID: Melchor Bergeron is a 64 y.o. male who presents for Annual Exam (And review labs ) and Medication Problem (Insurance will not cover Steglatro starting 1/1/2025/Will cover Farxiga or Jardiance ).    Diabetes  He presents for his follow-up diabetic visit. He has type 2 diabetes mellitus. No MedicAlert identification noted. The initial diagnosis of diabetes was made 10 years ago. His disease course has been worsening. Pertinent negatives for hypoglycemia include no confusion, dizziness, headaches, hunger, mood changes, nervousness/anxiousness, pallor, seizures, sleepiness, speech difficulty, sweats or tremors. Pertinent negatives for diabetes include no foot paresthesias, no polydipsia and no polyuria. Pertinent negatives for hypoglycemia complications include no blackouts, no hospitalization, no nocturnal hypoglycemia, no required assistance and no required glucagon injection. Symptoms are stable. Diabetic complications include heart disease, impotence and peripheral neuropathy. Risk factors for coronary artery disease include diabetes mellitus, dyslipidemia, hypertension and male sex. Current diabetic treatment includes oral agent (triple therapy). He is compliant with treatment none of the time. His weight is stable. He is following a generally healthy diet. When asked about meal planning, he reported none. He has not had a previous visit with a dietitian. He participates in exercise intermittently. He monitors blood glucose at home 3-4 x per week. Blood glucose monitoring compliance is fair. His home blood glucose trend is increasing rapidly. His breakfast blood glucose is taken between 5-6 am. His breakfast blood glucose range is generally  mg/dl. His lunch blood glucose is taken between 11-12 pm. His lunch blood glucose range is generally 110-130 mg/dl. His dinner blood glucose is taken between 4-5 pm. His dinner blood glucose range is generally 140-180 mg/dl. His bedtime blood glucose  "is taken between 9-10 pm. His bedtime blood glucose range is generally 110-130 mg/dl. His overall blood glucose range is 130-140 mg/dl. An ACE inhibitor/angiotensin II receptor blocker is being taken. He does not see a podiatrist.Eye exam is current.        Review of Systems   Constitutional:  Negative for activity change, appetite change, chills, malaise/fatigue and unexpected weight change.   HENT:  Negative for congestion, ear pain, hearing loss, nosebleeds, postnasal drip, rhinorrhea, sinus pressure, sneezing, sore throat, tinnitus, trouble swallowing and voice change.    Eyes:  Negative for photophobia, pain, discharge, redness, itching and visual disturbance.   Respiratory:  Negative for cough, choking, chest tightness and wheezing.    Cardiovascular:  Negative for leg swelling.   Gastrointestinal:  Negative for abdominal distention, blood in stool, constipation and diarrhea.   Endocrine: Negative for cold intolerance, heat intolerance, polydipsia and polyuria.   Genitourinary:  Positive for impotence. Negative for dysuria, flank pain, frequency, hematuria and urgency.   Musculoskeletal:  Positive for arthralgias. Negative for joint swelling, myalgias and neck stiffness.   Skin:  Negative for pallor, rash and wound.   Allergic/Immunologic: Negative for immunocompromised state.   Neurological:  Negative for dizziness, tremors, seizures, facial asymmetry, speech difficulty, numbness and headaches.   Hematological:  Negative for adenopathy. Does not bruise/bleed easily.   Psychiatric/Behavioral:  Negative for agitation, behavioral problems, confusion, dysphoric mood, hallucinations, self-injury, sleep disturbance and suicidal ideas. The patient is not nervous/anxious.        Objective   /90 (BP Location: Left arm, Patient Position: Sitting, BP Cuff Size: Large adult)   Pulse 88   Temp 36 °C (96.8 °F) (Temporal)   Resp 18   Ht 1.753 m (5' 9\")   Wt 80.3 kg (177 lb)   SpO2 97%   BMI 26.14 kg/m² "     Physical Exam  Constitutional:       General: He is not in acute distress.     Appearance: He is not ill-appearing or diaphoretic.   HENT:      Head: Normocephalic and atraumatic.      Right Ear: External ear normal.      Left Ear: External ear normal.      Nose: Nose normal. No rhinorrhea.   Eyes:      General: Lids are normal. No scleral icterus.        Right eye: No discharge.         Left eye: No discharge.      Conjunctiva/sclera: Conjunctivae normal.   Cardiovascular:      Rate and Rhythm: Normal rate and regular rhythm.      Pulses: Normal pulses.      Heart sounds: No murmur heard.  Pulmonary:      Effort: Pulmonary effort is normal. No respiratory distress.      Breath sounds: No decreased breath sounds, wheezing, rhonchi or rales.   Abdominal:      General: Bowel sounds are normal. There is no distension.      Palpations: Abdomen is soft. There is no mass.      Tenderness: There is no abdominal tenderness. There is no guarding or rebound.   Musculoskeletal:         General: No swelling or tenderness.      Cervical back: No rigidity or tenderness.      Right lower leg: No edema.      Left lower leg: No edema.      Comments: Diffuse arthritic deformities noted   Lymphadenopathy:      Cervical: No cervical adenopathy.      Upper Body:      Right upper body: No supraclavicular adenopathy.      Left upper body: No supraclavicular adenopathy.   Skin:     General: Skin is warm and dry.      Coloration: Skin is not jaundiced or pale.      Findings: No erythema, lesion or rash.   Neurological:      General: No focal deficit present.      Mental Status: He is alert and oriented to person, place, and time.      Sensory: No sensory deficit.      Motor: No weakness or tremor.      Coordination: Coordination normal.      Gait: Gait normal.   Psychiatric:         Mood and Affect: Mood normal. Affect is not inappropriate.         Behavior: Behavior normal.         Assessment/Plan   Diagnoses and all orders for this  visit:  Type 2 diabetes mellitus with microalbuminuria, without long-term current use of insulin (Multi)  -     Follow Up In [facility] - PCP - Established  -     Albumin-Creatinine Ratio, Urine Random; Future  -     CBC and Auto Differential; Future  -     Comprehensive Metabolic Panel; Future  -     Hemoglobin A1C; Future  -     Lipid Panel; Future  -     Magnesium; Future  -     TSH with reflex to Free T4 if abnormal; Future  -     Vascular US ankle brachial index (OSEI) without exercise; Future  -     dapagliflozin propanediol (Farxiga) 10 mg; Take 1 tablet (10 mg) by mouth once daily. Do not fill before January 1, 2025.  -     Follow Up In [facility]; Future  Primary hypertension  -     Follow Up In [facility]  -     Albumin-Creatinine Ratio, Urine Random; Future  -     CBC and Auto Differential; Future  -     Comprehensive Metabolic Panel; Future  -     Lipid Panel; Future  -     Magnesium; Future  -     TSH with reflex to Free T4 if abnormal; Future  -     Follow Up In [facility]; Future  Mixed hyperlipidemia  -     Follow Up In [facility]  -     Comprehensive Metabolic Panel; Future  -     Lipid Panel; Future  -     TSH with reflex to Free T4 if abnormal; Future  -     Follow Up In [facility]; Future  Cold feet  -     Vascular US ankle brachial index (OSEI) without exercise; Future  -     Follow Up In [facility] Established; Future

## 2024-12-17 ENCOUNTER — APPOINTMENT (OUTPATIENT)
Dept: PHARMACY | Facility: HOSPITAL | Age: 64
End: 2024-12-17
Payer: COMMERCIAL

## 2024-12-17 DIAGNOSIS — E11.29 TYPE 2 DIABETES MELLITUS WITH MICROALBUMINURIA, WITHOUT LONG-TERM CURRENT USE OF INSULIN (MULTI): ICD-10-CM

## 2024-12-17 DIAGNOSIS — R80.9 TYPE 2 DIABETES MELLITUS WITH MICROALBUMINURIA, WITHOUT LONG-TERM CURRENT USE OF INSULIN (MULTI): ICD-10-CM

## 2024-12-17 NOTE — ASSESSMENT & PLAN NOTE
Patient's goal A1c is < 7%.  Is pt at goal? No, 7.9% on 11.15.24  Patient's SMBGs are mostly at goal. Patient has been trying to endorse in healthier food options, and limit carb intake.  Rationale for plan: Will plan to continue current regimen today.     Medication Changes:  CONTINUE:  Metformin 1000 mg po BID  Rybelsus 14 mg PO daily  Steglatro 15 mg daily    Future Considerations:  Switching to Farxiga in Jan. [place]    Monitoring and Education:  Encouraged patient to test blood glucose regularly and will discuss at follow-up  Counseled patient on MOA, expectations, side effects, duration of therapy, contraindications, administration, and monitoring parameters  Answered all patient questions and concerns; provided [facility] phone number if issues/questions arise

## 2024-12-17 NOTE — PROGRESS NOTES
Clinical Pharmacy Appointment    Patient ID: Melchor Bergeron is a 64 y.o. male who presents for Diabetes.    Pt is here for Follow Up appointment.     Referring Provider: Alex Britt DO  PCP: Alex Britt DO   Last visit with PCP: 12/10/24  Next visit with PCP: 6/11/25    Subjective   Interval History  Trying to watch diet  Endorsing in more greens lately  Cutting out bread  Reports fasting readings mostly at goal  A1c decreased to 7.9%  Reports issue with Amoxil 875 mg - possibly refill too soon    HPI  DIABETES MELLITUS TYPE 2:    Diagnosed with diabetes:  12/19/23 DM with microalbuminuria. Known diabetic complications: microalbuminuria.  Does patient follow with Endocrinology: No     Current diabetic medications include:  Rybelsus 14 mg po every day  Metformin 1000 mg po BID  Steglatro 15 mg daily    Clarifications to above regimen: none  Adverse Effects: none reported    Glucose Readings:  Glucometer/CGM Type: Accu-Chek  Patient tests BG less than one time per day    Current home BG readings: No exact readings provided at time of appt today    Any episodes of hypoglycemia? No, patient denies .  Did patient treat episode of hypoglycemia appropriately? N/A  Does the patient have a prescription for ready-to-use Glucagon? Not on insulin  Does pt have proteinuria? Yes - UACr 91.2 on 11.15.24    Secondary Prevention:  Statin? Yes  ACE-I/ARB? Yes  Aspirin? Yes    Pertinent PMH Review:  PMH of Pancreatitis: No  PMH of Retinopathy: No  PMH of Urinary Tract Infections: No  PMH of MTC: No    Drug Interactions  No relevant drug interactions were noted.    Medication System Management  Patients preferred pharmacy: [facility]    Objective   Allergies   Allergen Reactions    Sulfa (Sulfonamide Antibiotics) Hives and Rash    Oxycodone Hallucinations    Metformin Diarrhea     Social History     Social History Narrative    Not on file      Medication Review  Current Outpatient Medications   Medication Instructions     amoxicillin (AMOXIL) 875 mg, oral, Daily    apixaban (ELIQUIS) 5 mg, 2 times daily    aspirin 81 mg, Daily    cyclobenzaprine (FLEXERIL) 10 mg, oral, 2 times daily PRN    [START ON 1/1/2025] dapagliflozin propanediol (FARXIGA) 10 mg, oral, Daily    DULoxetine (CYMBALTA) 30 mg, oral, Daily, Do not crush or chew.    ezetimibe (ZETIA) 10 mg, Daily RT    famotidine (PEPCID) 20 mg, oral, 2 times daily    glimepiride (AMARYL) 4 mg, oral, Daily before breakfast    hydrocortisone (Anusol-HC) 2.5 % rectal cream rectal, 4 times daily PRN    lisinopril 10 mg, oral, Daily    metFORMIN (GLUCOPHAGE) 1,000 mg, oral, 2 times daily, With meals    metoprolol succinate XL (TOPROL-XL) 50 mg, Daily RT    rosuvastatin (CRESTOR) 40 mg, oral, Daily    semaglutide (RYBELSUS) 14 mg, oral, Daily      Vitals  BP Readings from Last 2 Encounters:   12/10/24 132/90   09/10/24 131/83     BMI Readings from Last 1 Encounters:   12/10/24 26.14 kg/m²      Labs  A1C  Lab Results   Component Value Date    HGBA1C 7.9 (H) 11/15/2024    HGBA1C 8.6 (H) 10/22/2024    HGBA1C 10.5 (H) 09/04/2024     BMP  Lab Results   Component Value Date    CALCIUM 10.2 11/15/2024     11/15/2024    K 4.9 11/15/2024    CO2 29 11/15/2024     11/15/2024    BUN 29 (H) 11/15/2024    CREATININE 1.36 (H) 11/15/2024    EGFR 58 (L) 11/15/2024     LFTs  Lab Results   Component Value Date    ALT 19 11/15/2024    AST 16 11/15/2024    ALKPHOS 70 11/15/2024    BILITOT 0.6 11/15/2024     FLP  Lab Results   Component Value Date    TRIG 78 11/15/2024    CHOL 110 11/15/2024    LDLF 134 (H) 12/04/2020    LDLCALC 46 11/15/2024    HDL 48.7 11/15/2024     Urine Microalbumin  Lab Results   Component Value Date    MICROALBCREA 91.2 (H) 11/15/2024     Weight Management  Wt Readings from Last 3 Encounters:   12/10/24 80.3 kg (177 lb)   09/10/24 77.1 kg (170 lb)   06/11/24 71.2 kg (157 lb)      There is no height or weight on file to calculate BMI.     Assessment/Plan   Problem List Items  Addressed This Visit       Type 2 diabetes mellitus with microalbuminuria, without long-term current use of insulin (Multi)     Patient's goal A1c is < 7%.  Is pt at goal? No, 7.9% on 11.15.24  Patient's SMBGs are mostly at goal. Patient has been trying to endorse in healthier food options, and limit carb intake.  Rationale for plan: Will plan to continue current regimen today.     Medication Changes:  CONTINUE:  Metformin 1000 mg po BID  Rybelsus 14 mg PO daily  Steglatro 15 mg daily    Future Considerations:  Switching to Farxiga in [place].  [place]    Monitoring and Education:  Encouraged patient to test blood glucose regularly and will discuss at follow-up  Counseled patient on MOA, expectations, side effects, duration of therapy, contraindications, administration, and monitoring parameters  Answered all patient questions and concerns; provided [facility] phone number if issues/questions arise          Clinical Pharmacist follow-up: 2.4.25 @ 2:20 pm, Telehealth visit    Continue all meds under the continuation of care with the referring provider and clinical pharmacy team.    Thank you,  Jo-Ann Silva, PharmD  Clinical Pharmacist  [phone]    Verbal consent to manage patient's drug therapy was obtained from the patient. They were informed they may decline to participate or withdraw from participation in pharmacy services at any time.

## 2025-02-04 ENCOUNTER — APPOINTMENT (OUTPATIENT)
Dept: PHARMACY | Facility: HOSPITAL | Age: 65
End: 2025-02-04
Payer: COMMERCIAL

## 2025-02-04 DIAGNOSIS — E11.29 TYPE 2 DIABETES MELLITUS WITH MICROALBUMINURIA, WITHOUT LONG-TERM CURRENT USE OF INSULIN (MULTI): ICD-10-CM

## 2025-02-04 DIAGNOSIS — R80.9 TYPE 2 DIABETES MELLITUS WITH MICROALBUMINURIA, WITHOUT LONG-TERM CURRENT USE OF INSULIN (MULTI): ICD-10-CM

## 2025-02-04 NOTE — ASSESSMENT & PLAN NOTE
Patient's goal A1c is < 7%.  Is pt at goal? No, 7.9% on 11.15.24  Patient's SMBGs are mostly at goal. Patient has been trying to endorse in healthier food options, and limit carb intake.  Rationale for plan: Will plan to continue current regimen today. [employer] message sent to patient for [facility] to increase walking/aquatic therapy.    Medication Changes:  CONTINUE:  Metformin 1000 mg po BID  Rybelsus 14 mg PO daily  Steglatro 15 mg daily    Future Considerations:  Switching to Farxiga in February  [place]    Monitoring and Education:  Encouraged patient to test blood glucose regularly and will discuss at follow-up  Counseled patient on MOA, expectations, side effects, duration of therapy, contraindications, administration, and monitoring parameters  Answered all patient questions and concerns; provided [facility] phone number if issues/questions arise

## 2025-02-04 NOTE — PROGRESS NOTES
Clinical Pharmacy Appointment    Patient ID: Melchor Bergeron is a 64 y.o. male who presents for Diabetes.    Pt is here for Follow Up appointment.     Referring Provider: Alex Britt DO  PCP: Alex Britt DO   Last visit with PCP: 12/10/24  Next visit with PCP: 6/11/25    Subjective   Interval History  Trying to watch diet/walking more  Interested in alternative pool option than [employer] to help move knee more  Reports fasting readings mostly at goal  Continues with current medication therapy  No concerns today    HPI  DIABETES MELLITUS TYPE 2:    Diagnosed with diabetes:  12/19/23 DM with microalbuminuria. Known diabetic complications: microalbuminuria.  Does patient follow with Endocrinology: No     Current diabetic medications include:  Rybelsus 14 mg po every day  Metformin 1000 mg po BID  Steglatro 15 mg daily    Clarifications to above regimen: none  Adverse Effects: none reported    Glucose Readings:  Glucometer/CGM Type: Accu-Chek  Patient tests BG less than one time per day    Current home BG readings: No exact readings provided at time of appt today    Any episodes of hypoglycemia? No, patient denies .  Did patient treat episode of hypoglycemia appropriately? N/A  Does the patient have a prescription for ready-to-use Glucagon? Not on insulin  Does pt have proteinuria? Yes - UACr 91.2 on 11.15.24    Secondary Prevention:  Statin? Yes  ACE-I/ARB? Yes  Aspirin? Yes    Pertinent PMH Review:  PMH of Pancreatitis: No  PMH of Retinopathy: No  PMH of Urinary Tract Infections: No  PMH of MTC: No    Drug Interactions  No relevant drug interactions were noted.    Medication System Management  Patients preferred pharmacy: [facility]    Objective   Allergies   Allergen Reactions    Sulfa (Sulfonamide Antibiotics) Hives and Rash    Oxycodone Hallucinations    Metformin Diarrhea     Social History     Social History Narrative    Not on file      Medication Review  Current Outpatient Medications   Medication  Instructions    amoxicillin (AMOXIL) 875 mg, oral, Daily    apixaban (ELIQUIS) 5 mg, 2 times daily    aspirin 81 mg, Daily    cyclobenzaprine (FLEXERIL) 10 mg, oral, 2 times daily PRN    dapagliflozin propanediol (FARXIGA) 10 mg, oral, Daily    DULoxetine (CYMBALTA) 30 mg, oral, Daily, Do not crush or chew.    ezetimibe (ZETIA) 10 mg, Daily RT    famotidine (PEPCID) 20 mg, oral, 2 times daily    glimepiride (AMARYL) 4 mg, oral, Daily before breakfast    hydrocortisone (Anusol-HC) 2.5 % rectal cream rectal, 4 times daily PRN    lisinopril 10 mg, oral, Daily    metFORMIN (GLUCOPHAGE) 1,000 mg, oral, 2 times daily, With meals    metoprolol succinate XL (TOPROL-XL) 50 mg, Daily RT    rosuvastatin (CRESTOR) 40 mg, oral, Daily    semaglutide (RYBELSUS) 14 mg, oral, Daily      Vitals  BP Readings from Last 2 Encounters:   12/10/24 132/90   09/10/24 131/83     BMI Readings from Last 1 Encounters:   12/10/24 26.14 kg/m²      Labs  A1C  Lab Results   Component Value Date    HGBA1C 7.9 (H) 11/15/2024    HGBA1C 8.6 (H) 10/22/2024    HGBA1C 10.5 (H) 09/04/2024     BMP  Lab Results   Component Value Date    CALCIUM 10.2 11/15/2024     11/15/2024    K 4.9 11/15/2024    CO2 29 11/15/2024     11/15/2024    BUN 29 (H) 11/15/2024    CREATININE 1.36 (H) 11/15/2024    EGFR 58 (L) 11/15/2024     LFTs  Lab Results   Component Value Date    ALT 19 11/15/2024    AST 16 11/15/2024    ALKPHOS 70 11/15/2024    BILITOT 0.6 11/15/2024     FLP  Lab Results   Component Value Date    TRIG 78 11/15/2024    CHOL 110 11/15/2024    LDLF 134 (H) 12/04/2020    LDLCALC 46 11/15/2024    HDL 48.7 11/15/2024     Urine Microalbumin  Lab Results   Component Value Date    MICROALBCREA 91.2 (H) 11/15/2024     Weight Management  Wt Readings from Last 3 Encounters:   12/10/24 80.3 kg (177 lb)   09/10/24 77.1 kg (170 lb)   06/11/24 71.2 kg (157 lb)      There is no height or weight on file to calculate BMI.     Assessment/Plan   Problem List Items  Addressed This Visit       Type 2 diabetes mellitus with microalbuminuria, without long-term current use of insulin (Multi)     Patient's goal A1c is < 7%.  Is pt at goal? No, 7.9% on 11.15.24  Patient's SMBGs are mostly at goal. Patient has been trying to endorse in healthier food options, and limit carb intake.  Rationale for plan: Will plan to continue current regimen today. [employer] message sent to patient for information about [facility] (health center) to increase walking/aquatic therapy.    Medication Changes:  CONTINUE:  Metformin 1000 mg po BID  Rybelsus 14 mg PO daily  Steglatro 15 mg daily    Future Considerations:  Switching to Farxiga in February  Kerendia    Monitoring and Education:  Encouraged patient to test blood glucose regularly and will discuss at follow-up  Counseled patient on MOA, expectations, side effects, duration of therapy, contraindications, administration, and monitoring parameters  Answered all patient questions and concerns; provided [facility] phone number if issues/questions arise          Clinical Pharmacist follow-up: 5.20.25 @ 2:20 pm, Telehealth visit    Continue all meds under the continuation of care with the referring provider and clinical pharmacy team.    Thank you,  Jo-Ann Silva, PharmD  Clinical Pharmacist  [phone]    Verbal consent to manage patient's drug therapy was obtained from the patient. They were informed they may decline to participate or withdraw from participation in pharmacy services at any time.

## 2025-03-20 ENCOUNTER — TELEPHONE (OUTPATIENT)
Dept: PRIMARY CARE | Facility: CLINIC | Age: 65
End: 2025-03-20
Payer: COMMERCIAL

## 2025-03-20 DIAGNOSIS — Z96.652 TOTAL KNEE REPLACEMENT STATUS, LEFT: Primary | ICD-10-CM

## 2025-03-20 NOTE — TELEPHONE ENCOUNTER
Dr Britt pt calling in for a renewal on his handicap placard, dx: total knee replacement left knee. Pt can be reached at [phone] when ready for pickup

## 2025-03-21 ENCOUNTER — APPOINTMENT (OUTPATIENT)
Dept: RADIOLOGY | Facility: HOSPITAL | Age: 65
End: 2025-03-21
Payer: COMMERCIAL

## 2025-03-24 ENCOUNTER — HOSPITAL ENCOUNTER (OUTPATIENT)
Dept: RADIOLOGY | Facility: HOSPITAL | Age: 65
Discharge: HOME | End: 2025-03-24
Payer: COMMERCIAL

## 2025-03-24 DIAGNOSIS — R80.9 TYPE 2 DIABETES MELLITUS WITH MICROALBUMINURIA, WITHOUT LONG-TERM CURRENT USE OF INSULIN (MULTI): ICD-10-CM

## 2025-03-24 DIAGNOSIS — I73.9 PERIPHERAL VASCULAR DISEASE, UNSPECIFIED (CMS-HCC): ICD-10-CM

## 2025-03-24 DIAGNOSIS — R20.9 COLD FEET: ICD-10-CM

## 2025-03-24 DIAGNOSIS — E11.29 TYPE 2 DIABETES MELLITUS WITH MICROALBUMINURIA, WITHOUT LONG-TERM CURRENT USE OF INSULIN (MULTI): ICD-10-CM

## 2025-03-24 PROCEDURE — 93922 UPR/L XTREMITY ART 2 LEVELS: CPT

## 2025-03-24 PROCEDURE — 93922 UPR/L XTREMITY ART 2 LEVELS: CPT | Performed by: SURGERY

## 2025-04-08 DIAGNOSIS — E11.29 TYPE 2 DIABETES MELLITUS WITH MICROALBUMINURIA, WITHOUT LONG-TERM CURRENT USE OF INSULIN (MULTI): ICD-10-CM

## 2025-04-08 DIAGNOSIS — M00.862 ARTHRITIS DUE TO OTHER BACTERIA, LEFT KNEE (MULTI): ICD-10-CM

## 2025-04-08 DIAGNOSIS — F32.A ANXIETY AND DEPRESSION: ICD-10-CM

## 2025-04-08 DIAGNOSIS — R80.9 TYPE 2 DIABETES MELLITUS WITH MICROALBUMINURIA, WITHOUT LONG-TERM CURRENT USE OF INSULIN (MULTI): ICD-10-CM

## 2025-04-08 DIAGNOSIS — F41.9 ANXIETY AND DEPRESSION: ICD-10-CM

## 2025-04-08 RX ORDER — DULOXETIN HYDROCHLORIDE 30 MG/1
30 CAPSULE, DELAYED RELEASE ORAL DAILY
Qty: 90 CAPSULE | Refills: 3 | Status: SHIPPED | OUTPATIENT
Start: 2025-04-08

## 2025-04-08 RX ORDER — GLIMEPIRIDE 4 MG/1
4 TABLET ORAL
Qty: 90 TABLET | Refills: 3 | Status: SHIPPED | OUTPATIENT
Start: 2025-04-08

## 2025-05-13 ENCOUNTER — TELEPHONE (OUTPATIENT)
Dept: PHARMACY | Facility: HOSPITAL | Age: 65
End: 2025-05-13
Payer: COMMERCIAL

## 2025-05-13 ENCOUNTER — TELEPHONE (OUTPATIENT)
Dept: PRIMARY CARE | Facility: CLINIC | Age: 65
End: 2025-05-13
Payer: COMMERCIAL

## 2025-05-13 DIAGNOSIS — Z87.39 HISTORY OF OSTEOMYELITIS: Primary | ICD-10-CM

## 2025-05-13 DIAGNOSIS — Z87.39 HISTORY OF OSTEOMYELITIS: ICD-10-CM

## 2025-05-13 RX ORDER — AMOXICILLIN 500 MG/1
500 CAPSULE ORAL EVERY 8 HOURS SCHEDULED
COMMUNITY
Start: 2025-04-02 | End: 2025-05-13 | Stop reason: DRUGHIGH

## 2025-05-13 RX ORDER — AMOXICILLIN 500 MG/1
500 CAPSULE ORAL EVERY 8 HOURS SCHEDULED
Qty: 60 CAPSULE | Refills: 0 | Status: CANCELLED | OUTPATIENT
Start: 2025-05-13

## 2025-05-13 RX ORDER — AMOXICILLIN 875 MG/1
875 TABLET, FILM COATED ORAL DAILY
Qty: 7 TABLET | Refills: 0 | Status: SHIPPED | OUTPATIENT
Start: 2025-05-13

## 2025-05-13 RX ORDER — AMOXICILLIN 875 MG/1
875 TABLET, FILM COATED ORAL DAILY
Qty: 90 TABLET | Refills: 3 | Status: SHIPPED | OUTPATIENT
Start: 2025-05-13 | End: 2025-05-13 | Stop reason: SDUPTHER

## 2025-05-13 NOTE — TELEPHONE ENCOUNTER
Patient called to discuss Amoxicillin prescription. He only has 2 days left at home, and will not get refill from [facility] in time. He is requesting a 7 day supply sent to [facility] to hold him over until he gets his delivery. Will pend medication to PCP.    Thank you,  Nessa Kyle, PharmD  Clinical Pharmacist  [phone]

## 2025-05-20 ENCOUNTER — APPOINTMENT (OUTPATIENT)
Dept: PHARMACY | Facility: HOSPITAL | Age: 65
End: 2025-05-20
Payer: COMMERCIAL

## 2025-05-20 ENCOUNTER — TELEMEDICINE (OUTPATIENT)
Dept: PHARMACY | Facility: HOSPITAL | Age: 65
End: 2025-05-20
Payer: COMMERCIAL

## 2025-05-20 DIAGNOSIS — R80.9 TYPE 2 DIABETES MELLITUS WITH MICROALBUMINURIA, WITHOUT LONG-TERM CURRENT USE OF INSULIN (MULTI): ICD-10-CM

## 2025-05-20 DIAGNOSIS — E11.29 TYPE 2 DIABETES MELLITUS WITH MICROALBUMINURIA, WITHOUT LONG-TERM CURRENT USE OF INSULIN (MULTI): ICD-10-CM

## 2025-05-21 RX ORDER — MAGNESIUM 250 MG
250 TABLET ORAL DAILY
COMMUNITY

## 2025-05-21 RX ORDER — NIACIN 50 MG
50 TABLET ORAL
COMMUNITY

## 2025-05-21 NOTE — ASSESSMENT & PLAN NOTE
Patient's goal A1c is < 7%.  Is pt at goal? No, 7.9% on 11/15/24  Patient's SMBGs are mostly at goal from previous readings, no reported readings from today's visit. Patient has been trying to endorse in healthier food options, limit carb intake and increase physical activity.     Rationale for plan: Will plan to continue current regimen today. Patient to get new A1c prior to next PCP appointment in June, will follow-up around that time to discuss changes if needed.      Medication Changes: None    CONTINUE:  Metformin 1000 mg BID  Glimepiride 4 mg daily  Jardiance 25 mg daily  Rybelsus 14 mg daily     Future Considerations:  Kerendia     Monitoring and Education:  Encouraged patient to test blood glucose regularly and will discuss at follow-up  Counseled patient on MOA, expectations, side effects, duration of therapy, contraindications, administration, and monitoring parameters  Answered all patient questions and concerns; provided [facility] phone number if issues/questions arise

## 2025-05-21 NOTE — PROGRESS NOTES
"  Clinical Pharmacy Appointment    Patient ID: Melchor Bergeron is a 65 y.o. male who presents for Diabetes.    Referring Provider: Alex Britt DO     Pt is here for Follow Up appointment.      Referring Provider: Alex Britt DO  PCP: Alex Britt DO   Last visit with PCP: 12/10/24  Next visit with PCP: 6/11/25    Subjective     Interval History  Trying to watch diet/walking more  Was going to pool since last visit for about 4 months  Walking in park 3x/week for about an hour  Switched from Steglatro to Jardiance, reports no issues  Reports fasting readings mostly at goal    Lifestyle Changes:  Current diet: in general, a \"healthy\" diet  , low fat/ cholesterol  Current exercise: swimming in pool and walking 3x/week for 1 hour    Glucose Monitoring Regimen:  Patient is using glucometer; testing infrequently d/t finger fatigue    Any episodes of hypoglycemia? no    Adverse Effects:   Patient denies any GI adverse effects (Upset stomach/diarrhea/constipation/nausea/vomiting)  Patient denies any urinary side effects (frequent urination/yeast infection/UTI)    Adherence: Good, patient has some confusion regarding current therapy however expected due to recent changes  Affordability/Accessibility  No issues with cost of medications at this time.     Diabetes Pharmacotherapy:    Metformin 1000 mg BID  Glimepiride 4 mg daily  Jardiance 25 mg daily  Rybelsus 14 mg daily    Historical Diabetes Pharmacotherapy:  Steglatro    Secondary Prevention  Statin? Yes (Rosuvastatin 40 mg)  ACE-I/ARB? Yes (Lisinopril 10 mg)  Aspirin? Yes    Pertinent PMH Review  PMH of Pancreatitis: No  PMH of Retinopathy: No  PMH of Recurrent Urinary Tract Infections: No  PMH of Medullary Thyroid Cancer (MTC): No  PMH of Multiple Endocrine Neoplasia (MEN) Type II: No    Health Maintenance:   Foot Exam: unknown  Eye Exam: unknown  Lipid Panel: LDL 46 mg/dL   UACR: 91.2 (11/2024)  Influenza Vaccine: 9/30/24  Pneumonia Vaccine: PCV20 " 3/23/24, PPSV23 8/23/16    Drug Interactions: No significant drug-drug interactions exist that require change in therapy.    Medication System Management  Patient's preferred pharmacy: [facility]    Objective     There were no vitals taken for this visit.     Labs  Lab Results   Component Value Date    BILITOT 0.6 11/15/2024    CALCIUM 10.2 11/15/2024    CO2 29 11/15/2024     11/15/2024    CREATININE 1.36 (H) 11/15/2024    GLUCOSE 87 11/15/2024    ALKPHOS 70 11/15/2024    K 4.9 11/15/2024    PROT 6.7 11/15/2024     11/15/2024    AST 16 11/15/2024    ALT 19 11/15/2024    BUN 29 (H) 11/15/2024    ANIONGAP 12 11/15/2024    MG 1.90 11/15/2024    ALBUMIN 4.3 11/15/2024     Lab Results   Component Value Date    TRIG 78 11/15/2024    CHOL 110 11/15/2024    LDLCALC 46 11/15/2024    HDL 48.7 11/15/2024     Lab Results   Component Value Date    HGBA1C 7.9 (H) 11/15/2024       Medications Ordered Prior to Encounter[1]     Assessment/Plan   Problem List Items Addressed This Visit           ICD-10-CM    Type 2 diabetes mellitus with microalbuminuria, without long-term current use of insulin (Multi) E11.29, R80.9    Patient's goal A1c is < 7%.  Is pt at goal? No, 7.9% on 11/15/24  Patient's SMBGs are mostly at goal from previous readings, no reported readings from today's visit. Patient has been trying to endorse in healthier food options, limit carb intake and increase physical activity.     Rationale for plan: Will plan to continue current regimen today. Patient to get new A1c prior to next PCP appointment in June, will follow-up around that time to discuss changes if needed.      Medication Changes: None    CONTINUE:  Metformin 1000 mg BID  Glimepiride 4 mg daily  Jardiance 25 mg daily  Rybelsus 14 mg daily     Future Considerations:  Kerendia     Monitoring and Education:  Encouraged patient to test blood glucose regularly and will discuss at follow-up  Counseled patient on MOA, expectations, side  effects, duration of therapy, contraindications, administration, and monitoring parameters  Answered all patient questions and concerns; provided [facility] phone number if issues/questions arise          Renee Ash PharmD  Clinical Ambulatory Care Pharmacist  Ph: [phone]    Continue all meds under the continuation of care with the referring provider and clinical pharmacy team.    Clinical Pharmacist follow up: 6/17/25 or sooner as needed based on clinical intervention  Type of Encounter:  Telephone    Verbal consent to manage patient's drug therapy was obtained from the patient. They were informed they may decline to participate or withdraw from participation in pharmacy services at any time.       [1]   Current Outpatient Medications on File Prior to Visit   Medication Sig Dispense Refill    amoxicillin (Amoxil) 875 mg tablet Take 1 tablet (875 mg) by mouth once daily. 7 tablet 0    apixaban (Eliquis) 5 mg tablet Take 1 tablet (5 mg) by mouth twice a day.      aspirin 81 mg EC tablet Take 1 tablet (81 mg) by mouth once daily.      cyclobenzaprine (Flexeril) 10 mg tablet Take 1 tablet (10 mg) by mouth 2 times a day as needed for muscle spasms. 180 tablet 3    dapagliflozin propanediol (Farxiga) 10 mg Take 1 tablet (10 mg) by mouth once daily. Do not fill before January 1, 2025. 90 tablet 3    DULoxetine (Cymbalta) 30 mg DR capsule TAKE 1 CAPSULE DAILY (DO NOT CRUSH OR CHEW) 90 capsule 3    empagliflozin (Jardiance) 25 mg Take 1 tablet (25 mg) by mouth once daily in the morning. 90 tablet 1    ezetimibe (Zetia) 10 mg tablet Take 1 tablet (10 mg) by mouth once daily.      famotidine (Pepcid) 20 mg tablet TAKE 1 TABLET TWICE A  tablet 3    glimepiride (Amaryl) 4 mg tablet TAKE 1 TABLET DAILY IN THE MORNING BEFORE A MEAL 90 tablet 3    hydrocortisone (Anusol-HC) 2.5 % rectal cream Insert into the rectum 4 times a day as needed for hemorrhoids (rectal discomfort). 30 g 11    lisinopril 10 mg tablet Take 1  tablet (10 mg) by mouth once daily. 90 tablet 3    metFORMIN (Glucophage) 1,000 mg tablet Take 1 tablet (1,000 mg) by mouth 2 times a day. With meals 180 tablet 3    metoprolol succinate XL (Toprol-XL) 50 mg 24 hr tablet Take 1 tablet (50 mg) by mouth once daily.      rosuvastatin (Crestor) 40 mg tablet Take 1 tablet (40 mg) by mouth once daily. 90 tablet 3    semaglutide (Rybelsus) 14 mg tablet tablet Take 1 tablet (14 mg) by mouth once daily. 90 tablet 3     No current facility-administered medications on file prior to visit.

## 2025-06-04 ASSESSMENT — ENCOUNTER SYMPTOMS
NERVOUS/ANXIOUS: 1
WEAKNESS: 1
FATIGUE: 1

## 2025-06-06 LAB
ALBUMIN SERPL-MCNC: 4.4 G/DL (ref 3.6–5.1)
ALBUMIN/CREAT UR: 182 MG/G CREAT
ALP SERPL-CCNC: 82 U/L (ref 35–144)
ALT SERPL-CCNC: 19 U/L (ref 9–46)
ANION GAP SERPL CALCULATED.4IONS-SCNC: 8 MMOL/L (CALC) (ref 7–17)
AST SERPL-CCNC: 19 U/L (ref 10–35)
BASOPHILS # BLD AUTO: 29 CELLS/UL (ref 0–200)
BASOPHILS NFR BLD AUTO: 0.5 %
BILIRUB SERPL-MCNC: 0.6 MG/DL (ref 0.2–1.2)
BUN SERPL-MCNC: 27 MG/DL (ref 7–25)
CALCIUM SERPL-MCNC: 9.6 MG/DL (ref 8.6–10.3)
CHLORIDE SERPL-SCNC: 104 MMOL/L (ref 98–110)
CHOLEST SERPL-MCNC: 134 MG/DL
CHOLEST/HDLC SERPL: 2.3 (CALC)
CO2 SERPL-SCNC: 27 MMOL/L (ref 20–32)
CREAT SERPL-MCNC: 1.26 MG/DL (ref 0.7–1.35)
CREAT UR-MCNC: 73 MG/DL (ref 20–320)
EGFRCR SERPLBLD CKD-EPI 2021: 64 ML/MIN/1.73M2
EOSINOPHIL # BLD AUTO: 131 CELLS/UL (ref 15–500)
EOSINOPHIL NFR BLD AUTO: 2.3 %
ERYTHROCYTE [DISTWIDTH] IN BLOOD BY AUTOMATED COUNT: 14.3 % (ref 11–15)
EST. AVERAGE GLUCOSE BLD GHB EST-MCNC: 197 MG/DL
EST. AVERAGE GLUCOSE BLD GHB EST-SCNC: 10.9 MMOL/L
GLUCOSE SERPL-MCNC: 89 MG/DL (ref 65–99)
HBA1C MFR BLD: 8.5 %
HCT VFR BLD AUTO: 46.2 % (ref 38.5–50)
HDLC SERPL-MCNC: 58 MG/DL
HGB BLD-MCNC: 14.6 G/DL (ref 13.2–17.1)
LDLC SERPL CALC-MCNC: 60 MG/DL (CALC)
LYMPHOCYTES # BLD AUTO: 855 CELLS/UL (ref 850–3900)
LYMPHOCYTES NFR BLD AUTO: 15 %
MAGNESIUM SERPL-MCNC: 2 MG/DL (ref 1.5–2.5)
MCH RBC QN AUTO: 27.7 PG (ref 27–33)
MCHC RBC AUTO-ENTMCNC: 31.6 G/DL (ref 32–36)
MCV RBC AUTO: 87.7 FL (ref 80–100)
MICROALBUMIN UR-MCNC: 13.3 MG/DL
MONOCYTES # BLD AUTO: 473 CELLS/UL (ref 200–950)
MONOCYTES NFR BLD AUTO: 8.3 %
NEUTROPHILS # BLD AUTO: 4212 CELLS/UL (ref 1500–7800)
NEUTROPHILS NFR BLD AUTO: 73.9 %
NONHDLC SERPL-MCNC: 76 MG/DL (CALC)
PLATELET # BLD AUTO: 147 THOUSAND/UL (ref 140–400)
PMV BLD REES-ECKER: 10.6 FL (ref 7.5–12.5)
POTASSIUM SERPL-SCNC: 5 MMOL/L (ref 3.5–5.3)
PROT SERPL-MCNC: 6.7 G/DL (ref 6.1–8.1)
RBC # BLD AUTO: 5.27 MILLION/UL (ref 4.2–5.8)
SODIUM SERPL-SCNC: 139 MMOL/L (ref 135–146)
TRIGL SERPL-MCNC: 82 MG/DL
TSH SERPL-ACNC: 3.59 MIU/L (ref 0.4–4.5)
WBC # BLD AUTO: 5.7 THOUSAND/UL (ref 3.8–10.8)

## 2025-06-09 DIAGNOSIS — K21.9 GASTROESOPHAGEAL REFLUX DISEASE, UNSPECIFIED WHETHER ESOPHAGITIS PRESENT: ICD-10-CM

## 2025-06-09 RX ORDER — FAMOTIDINE 20 MG/1
20 TABLET, FILM COATED ORAL 2 TIMES DAILY
Qty: 180 TABLET | Refills: 3 | Status: SHIPPED | OUTPATIENT
Start: 2025-06-09 | End: 2025-06-11 | Stop reason: SDUPTHER

## 2025-06-11 ENCOUNTER — APPOINTMENT (OUTPATIENT)
Dept: PRIMARY CARE | Facility: CLINIC | Age: 65
End: 2025-06-11
Payer: COMMERCIAL

## 2025-06-11 VITALS
BODY MASS INDEX: 25.22 KG/M2 | HEART RATE: 66 BPM | WEIGHT: 170.31 LBS | HEIGHT: 69 IN | SYSTOLIC BLOOD PRESSURE: 137 MMHG | RESPIRATION RATE: 16 BRPM | DIASTOLIC BLOOD PRESSURE: 72 MMHG | OXYGEN SATURATION: 96 % | TEMPERATURE: 95.4 F

## 2025-06-11 DIAGNOSIS — I48.0 PAROXYSMAL A-FIB (MULTI): ICD-10-CM

## 2025-06-11 DIAGNOSIS — R80.9 TYPE 2 DIABETES MELLITUS WITH MICROALBUMINURIA, WITHOUT LONG-TERM CURRENT USE OF INSULIN (MULTI): ICD-10-CM

## 2025-06-11 DIAGNOSIS — Z12.5 SPECIAL SCREENING EXAMINATION FOR NEOPLASM OF PROSTATE: ICD-10-CM

## 2025-06-11 DIAGNOSIS — E78.2 MIXED HYPERLIPIDEMIA: ICD-10-CM

## 2025-06-11 DIAGNOSIS — Z12.11 ENCOUNTER FOR SCREENING FOR MALIGNANT NEOPLASM OF COLON: ICD-10-CM

## 2025-06-11 DIAGNOSIS — M00.862 ARTHRITIS DUE TO OTHER BACTERIA, LEFT KNEE (MULTI): ICD-10-CM

## 2025-06-11 DIAGNOSIS — K21.9 GASTROESOPHAGEAL REFLUX DISEASE, UNSPECIFIED WHETHER ESOPHAGITIS PRESENT: ICD-10-CM

## 2025-06-11 DIAGNOSIS — I10 PRIMARY HYPERTENSION: ICD-10-CM

## 2025-06-11 DIAGNOSIS — E11.29 TYPE 2 DIABETES MELLITUS WITH MICROALBUMINURIA, WITHOUT LONG-TERM CURRENT USE OF INSULIN (MULTI): ICD-10-CM

## 2025-06-11 DIAGNOSIS — F41.9 ANXIETY AND DEPRESSION: ICD-10-CM

## 2025-06-11 DIAGNOSIS — F32.A ANXIETY AND DEPRESSION: ICD-10-CM

## 2025-06-11 DIAGNOSIS — L30.9 DERMATITIS: ICD-10-CM

## 2025-06-11 DIAGNOSIS — Z00.00 ROUTINE GENERAL MEDICAL EXAMINATION AT A HEALTH CARE FACILITY: Primary | ICD-10-CM

## 2025-06-11 PROCEDURE — 3008F BODY MASS INDEX DOCD: CPT | Performed by: FAMILY MEDICINE

## 2025-06-11 PROCEDURE — 3078F DIAST BP <80 MM HG: CPT | Performed by: FAMILY MEDICINE

## 2025-06-11 PROCEDURE — 99396 PREV VISIT EST AGE 40-64: CPT | Performed by: FAMILY MEDICINE

## 2025-06-11 PROCEDURE — 4010F ACE/ARB THERAPY RXD/TAKEN: CPT | Performed by: FAMILY MEDICINE

## 2025-06-11 PROCEDURE — 1036F TOBACCO NON-USER: CPT | Performed by: FAMILY MEDICINE

## 2025-06-11 PROCEDURE — 3075F SYST BP GE 130 - 139MM HG: CPT | Performed by: FAMILY MEDICINE

## 2025-06-11 RX ORDER — METFORMIN HYDROCHLORIDE 1000 MG/1
1000 TABLET ORAL 2 TIMES DAILY
Qty: 180 TABLET | Refills: 3 | Status: SHIPPED | OUTPATIENT
Start: 2025-06-11 | End: 2026-06-11

## 2025-06-11 RX ORDER — CHOLECALCIFEROL (VITAMIN D3) 25 MCG
25 TABLET ORAL DAILY
COMMUNITY

## 2025-06-11 RX ORDER — FAMOTIDINE 20 MG/1
20 TABLET, FILM COATED ORAL 2 TIMES DAILY
Qty: 180 TABLET | Refills: 3 | Status: SHIPPED | OUTPATIENT
Start: 2025-06-11

## 2025-06-11 RX ORDER — LISINOPRIL 10 MG/1
10 TABLET ORAL DAILY
Qty: 90 TABLET | Refills: 3 | Status: SHIPPED | OUTPATIENT
Start: 2025-06-11 | End: 2026-06-11

## 2025-06-11 RX ORDER — CYCLOBENZAPRINE HCL 10 MG
10 TABLET ORAL 2 TIMES DAILY PRN
Qty: 180 TABLET | Refills: 3 | Status: SHIPPED | OUTPATIENT
Start: 2025-06-11 | End: 2026-06-11

## 2025-06-11 RX ORDER — GLIMEPIRIDE 4 MG/1
4 TABLET ORAL
Qty: 90 TABLET | Refills: 3 | Status: SHIPPED | OUTPATIENT
Start: 2025-06-11

## 2025-06-11 RX ORDER — ASPIRIN 81 MG/1
81 TABLET ORAL DAILY
Qty: 90 TABLET | Refills: 3 | Status: SHIPPED | OUTPATIENT
Start: 2025-06-11 | End: 2026-06-11

## 2025-06-11 RX ORDER — BETAMETHASONE DIPROPIONATE 0.5 MG/G
CREAM TOPICAL 2 TIMES DAILY PRN
Qty: 45 G | Refills: 11 | Status: SHIPPED | OUTPATIENT
Start: 2025-06-11 | End: 2026-06-11

## 2025-06-11 RX ORDER — METOPROLOL SUCCINATE 50 MG/1
50 TABLET, EXTENDED RELEASE ORAL DAILY
Qty: 90 TABLET | Refills: 3 | Status: SHIPPED | OUTPATIENT
Start: 2025-06-11 | End: 2026-06-11

## 2025-06-11 RX ORDER — DULOXETIN HYDROCHLORIDE 30 MG/1
30 CAPSULE, DELAYED RELEASE ORAL DAILY
Qty: 90 CAPSULE | Refills: 3 | Status: SHIPPED | OUTPATIENT
Start: 2025-06-11

## 2025-06-11 RX ORDER — EZETIMIBE 10 MG/1
10 TABLET ORAL
Qty: 90 TABLET | Refills: 3 | Status: SHIPPED | OUTPATIENT
Start: 2025-06-11 | End: 2026-06-11

## 2025-06-11 RX ORDER — ROSUVASTATIN CALCIUM 40 MG/1
40 TABLET, COATED ORAL DAILY
Qty: 90 TABLET | Refills: 3 | Status: SHIPPED | OUTPATIENT
Start: 2025-06-11 | End: 2026-06-11

## 2025-06-11 ASSESSMENT — ENCOUNTER SYMPTOMS
HEADACHES: 0
BLACKOUTS: 0
POLYDIPSIA: 0
ABDOMINAL DISTENTION: 0
EYE ITCHING: 0
PHOTOPHOBIA: 0
TROUBLE SWALLOWING: 0
CHOKING: 0
VOICE CHANGE: 0
AGITATION: 0
ACTIVITY CHANGE: 0
FREQUENCY: 0
SEIZURES: 0
JOINT SWELLING: 0
CONSTIPATION: 0
CONFUSION: 0
RHINORRHEA: 0
COUGH: 0
DYSURIA: 0
DYSPHORIC MOOD: 0
EYE PAIN: 0
WOUND: 0
HEMATURIA: 0
BLOOD IN STOOL: 0
EYE DISCHARGE: 0
FATIGUE: 1
SINUS PRESSURE: 0
APPETITE CHANGE: 0
UNEXPECTED WEIGHT CHANGE: 0
DIARRHEA: 0
SPEECH DIFFICULTY: 0
TREMORS: 0
DIZZINESS: 0
NUMBNESS: 0
WEAKNESS: 1
FLANK PAIN: 0
NERVOUS/ANXIOUS: 0
FACIAL ASYMMETRY: 0
WHEEZING: 0
SLEEP DISTURBANCE: 0
MYALGIAS: 0
HALLUCINATIONS: 0
SORE THROAT: 0
HUNGER: 0
ARTHRALGIAS: 1
NECK STIFFNESS: 0
SWEATS: 0
EYE REDNESS: 0
CHEST TIGHTNESS: 0
BRUISES/BLEEDS EASILY: 0
ADENOPATHY: 0
CHILLS: 0

## 2025-06-11 ASSESSMENT — PATIENT HEALTH QUESTIONNAIRE - PHQ9
1. LITTLE INTEREST OR PLEASURE IN DOING THINGS: NOT AT ALL
2. FEELING DOWN, DEPRESSED OR HOPELESS: NOT AT ALL
SUM OF ALL RESPONSES TO PHQ9 QUESTIONS 1 AND 2: 0

## 2025-06-11 NOTE — PROGRESS NOTES
Subjective   Patient ID: Melchor Bergeron is a 65 y.o. male who presents for Annual Exam (Patient states that on right eyelid has a bump that he would like looked at. /Patient would also like a prescription for betamethasone cream. ).    Subjective  Melchor Bergeron is a 65 y.o. male and is here for a comprehensive physical exam. The patient reports has been taking metformin only once daily due to loose stools.  Overall doing well.  Has a small mobile cyst on right upper eyelid.    Do you take any herbs or supplements that were not prescribed by a doctor? no  Are you taking calcium supplements? no  Are you taking aspirin daily? yes      History:  Date last PSA: 3/18/24         Diabetes  He presents for his follow-up diabetic visit. He has type 2 diabetes mellitus. No MedicAlert identification noted. The initial diagnosis of diabetes was made 10 years ago. His disease course has been worsening. Pertinent negatives for hypoglycemia include no confusion, dizziness, headaches, hunger, mood changes, nervousness/anxiousness, pallor, seizures, sleepiness, speech difficulty, sweats or tremors. Associated symptoms include fatigue and weakness. Pertinent negatives for diabetes include no foot paresthesias, no polydipsia and no polyuria. Pertinent negatives for hypoglycemia complications include no blackouts, no hospitalization, no nocturnal hypoglycemia, no required assistance and no required glucagon injection. Symptoms are stable. Diabetic complications include heart disease, impotence and peripheral neuropathy. Risk factors for coronary artery disease include diabetes mellitus, dyslipidemia, hypertension and male sex. Current diabetic treatment includes oral agent (triple therapy). He is compliant with treatment none of the time. His weight is stable. He is following a generally healthy diet. When asked about meal planning, he reported none. He has not had a previous visit with a dietitian. He participates in exercise  intermittently. He monitors blood glucose at home 3-4 x per week. He monitors urine at home <1 x per month. Blood glucose monitoring compliance is fair. His home blood glucose trend is increasing rapidly. His breakfast blood glucose is taken between 5-6 am. His breakfast blood glucose range is generally  mg/dl. His lunch blood glucose is taken between 11-12 pm. His lunch blood glucose range is generally 110-130 mg/dl. His dinner blood glucose is taken between 4-5 pm. His dinner blood glucose range is generally 140-180 mg/dl. His bedtime blood glucose is taken between 9-10 pm. His bedtime blood glucose range is generally 110-130 mg/dl. His overall blood glucose range is 130-140 mg/dl. An ACE inhibitor/angiotensin II receptor blocker is being taken. He does not see a podiatrist.Eye exam is current.        Review of Systems   Constitutional:  Positive for fatigue. Negative for activity change, appetite change, chills and unexpected weight change.   HENT:  Negative for congestion, ear pain, hearing loss, nosebleeds, postnasal drip, rhinorrhea, sinus pressure, sneezing, sore throat, tinnitus, trouble swallowing and voice change.    Eyes:  Negative for photophobia, pain, discharge, redness, itching and visual disturbance.   Respiratory:  Negative for cough, choking, chest tightness and wheezing.    Cardiovascular:  Negative for leg swelling.   Gastrointestinal:  Negative for abdominal distention, blood in stool, constipation and diarrhea.   Endocrine: Negative for cold intolerance, heat intolerance, polydipsia and polyuria.   Genitourinary:  Positive for impotence. Negative for dysuria, flank pain, frequency, hematuria and urgency.   Musculoskeletal:  Positive for arthralgias. Negative for joint swelling, myalgias and neck stiffness.   Skin:  Negative for pallor, rash and wound.   Allergic/Immunologic: Negative for immunocompromised state.   Neurological:  Positive for weakness. Negative for dizziness, tremors,  "seizures, facial asymmetry, speech difficulty, numbness and headaches.   Hematological:  Negative for adenopathy. Does not bruise/bleed easily.   Psychiatric/Behavioral:  Negative for agitation, behavioral problems, confusion, dysphoric mood, hallucinations, self-injury, sleep disturbance and suicidal ideas. The patient is not nervous/anxious.        Objective   /72   Pulse 66   Temp 35.2 °C (95.4 °F) (Temporal)   Resp 16   Ht 1.753 m (5' 9\")   Wt 77.3 kg (170 lb 5 oz)   SpO2 96%   BMI 25.15 kg/m²     Physical Exam  Constitutional:       General: He is not in acute distress.     Appearance: He is not ill-appearing or diaphoretic.   HENT:      Head: Normocephalic and atraumatic.      Right Ear: External ear normal.      Left Ear: External ear normal.      Nose: Nose normal. No rhinorrhea.   Eyes:      General: Lids are normal. No scleral icterus.        Right eye: No discharge.         Left eye: No discharge.      Conjunctiva/sclera: Conjunctivae normal.   Cardiovascular:      Rate and Rhythm: Normal rate and regular rhythm.      Pulses: Normal pulses.      Heart sounds: No murmur heard.  Pulmonary:      Effort: Pulmonary effort is normal. No respiratory distress.      Breath sounds: No decreased breath sounds, wheezing, rhonchi or rales.   Abdominal:      General: Bowel sounds are normal. There is no distension.      Palpations: Abdomen is soft. There is no mass.      Tenderness: There is no abdominal tenderness. There is no guarding or rebound.   Musculoskeletal:         General: No swelling or tenderness.      Cervical back: No rigidity or tenderness.      Right lower leg: No edema.      Left lower leg: No edema.      Comments: Diffuse arthritic deformities noted   Lymphadenopathy:      Cervical: No cervical adenopathy.      Upper Body:      Right upper body: No supraclavicular adenopathy.      Left upper body: No supraclavicular adenopathy.   Skin:     General: Skin is warm and dry.      Coloration: " Skin is not jaundiced or pale.      Findings: No erythema, lesion or rash.   Neurological:      General: No focal deficit present.      Mental Status: He is alert and oriented to person, place, and time.      Sensory: No sensory deficit.      Motor: No weakness or tremor.      Coordination: Coordination normal.      Gait: Gait normal.   Psychiatric:         Mood and Affect: Mood normal. Affect is not inappropriate.         Behavior: Behavior normal.         Assessment/Plan   Diagnoses and all orders for this visit:  Routine general medical examination at a health care facility  Type 2 diabetes mellitus with microalbuminuria, without long-term current use of insulin (Multi)  -     Follow Up In [facility] - PCP - Established  -     aspirin 81 mg EC tablet; Take 1 tablet (81 mg) by mouth once daily.  -     empagliflozin (Jardiance) 25 mg tablet; Take 1 tablet (25 mg) by mouth once daily in the morning.  -     ezetimibe (Zetia) 10 mg tablet; Take 1 tablet (10 mg) by mouth once daily.  -     glimepiride (Amaryl) 4 mg tablet; Take 1 tablet (4 mg) by mouth once daily in the morning. Take before meals.  -     lisinopril 10 mg tablet; Take 1 tablet (10 mg) by mouth once daily.  -     metFORMIN (Glucophage) 1,000 mg tablet; Take 1 tablet (1,000 mg) by mouth 2 times a day. With meals  -     rosuvastatin (Crestor) 40 mg tablet; Take 1 tablet (40 mg) by mouth once daily.  -     semaglutide (Rybelsus) 14 mg tablet tablet; Take 1 tablet (14 mg) by mouth once daily.  -     Albumin-Creatinine Ratio, Urine Random; Future  -     CBC and Auto Differential; Future  -     Comprehensive Metabolic Panel; Future  -     Hemoglobin A1C; Future  -     Lipid Panel; Future  -     Magnesium; Future  -     TSH with reflex to Free T4 if abnormal; Future  Primary hypertension  -     Follow Up In [facility] - PCP - Established  -     lisinopril 10 mg tablet; Take 1 tablet (10 mg) by mouth once daily.  -     metoprolol succinate  XL (Toprol-XL) 50 mg 24 hr tablet; Take 1 tablet (50 mg) by mouth once daily.  -     Albumin-Creatinine Ratio, Urine Random; Future  -     CBC and Auto Differential; Future  -     Comprehensive Metabolic Panel; Future  -     Lipid Panel; Future  -     Magnesium; Future  -     TSH with reflex to Free T4 if abnormal; Future  Mixed hyperlipidemia  -     Follow Up In [facility] - PCP - Established  -     ezetimibe (Zetia) 10 mg tablet; Take 1 tablet (10 mg) by mouth once daily.  -     rosuvastatin (Crestor) 40 mg tablet; Take 1 tablet (40 mg) by mouth once daily.  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  -     DULoxetine (Cymbalta) 30 mg DR capsule; Take 1 capsule (30 mg) by mouth once daily. Do not crush or chew  Anxiety and depression  -     DULoxetine (Cymbalta) 30 mg DR capsule; Take 1 capsule (30 mg) by mouth once daily. Do not crush or chew  Gastroesophageal reflux disease, unspecified whether esophagitis present  -     famotidine (Pepcid) 20 mg tablet; Take 1 tablet (20 mg) by mouth 2 times a day.  Paroxysmal A-fib (Multi)  -     apixaban (Eliquis) 5 mg tablet; Take 1 tablet (5 mg) by mouth 2 times a day.  -     metoprolol succinate XL (Toprol-XL) 50 mg 24 hr tablet; Take 1 tablet (50 mg) by mouth once daily.  -     CBC and Auto Differential; Future  -     Magnesium; Future  -     TSH with reflex to Free T4 if abnormal; Future  Encounter for screening for malignant neoplasm of colon  -     Colonoscopy Screening; High Risk Patient; history of polyps; Future  Dermatitis  -     betamethasone dipropionate 0.05 % cream; Apply topically 2 times a day as needed for irritation or rash.  Special screening examination for neoplasm of prostate  -     Prostate  Specific Antigen, Screen; Future  As far as cyst recommend discussed with his eye doctor he does have a diabetic eye exam due in the near future.  If necessary we can refer to ophthalmology but at this point since it is not causing any other symptomatology than just being there we would simply monitor.  In terms of the sugar  I recommend that he increase his metformin to 1000 in the morning and 500 in the evening x 1 to 2 months then increase to 1000 twice daily thereafter-if tolerated.  If unable to tolerate due to loose stools then we will consider alternatives at next visit.  2. Patient Counseling:  --Nutrition: Stressed importance of moderation in sodium/caffeine intake, saturated fat and cholesterol, caloric balance, sufficient intake of fresh fruits, vegetables, fiber, calcium, iron.  --Exercise: Stressed the importance of regular exercise.   --Substance Abuse: Discussed cessation/primary prevention of tobacco, alcohol, or other drug use; driving or other dangerous activities under the influence; availability of treatment for abuse.   --Injury prevention: Discussed safety belts, safety helmets, smoke detector, smoking near bedding or upholstery.   --Dental health: Discussed importance of regular tooth brushing, flossing, and dental visits.  --Immunizations reviewed.  --Discussed benefits of screening colonoscopy.ordered  3. Discussed the patient's BMI with him.  The BMI is in the acceptable range.  4. Follow up 6 mos wlabs

## 2025-06-17 ENCOUNTER — APPOINTMENT (OUTPATIENT)
Dept: PHARMACY | Facility: HOSPITAL | Age: 65
End: 2025-06-17
Payer: COMMERCIAL

## 2025-06-17 DIAGNOSIS — E11.29 TYPE 2 DIABETES MELLITUS WITH MICROALBUMINURIA, WITHOUT LONG-TERM CURRENT USE OF INSULIN (MULTI): ICD-10-CM

## 2025-06-17 DIAGNOSIS — R80.9 TYPE 2 DIABETES MELLITUS WITH MICROALBUMINURIA, WITHOUT LONG-TERM CURRENT USE OF INSULIN (MULTI): ICD-10-CM

## 2025-06-17 NOTE — ASSESSMENT & PLAN NOTE
Patient's goal A1c is < 7%.  Is pt at goal? No, 8.5% on 6/11/25.  Patient's SMBGs are unavailable from today's visit, however elevated likely based on A1c. Patient has been trying to endorse in healthier food options, limit carb intake and increase physical activity.      Rationale for plan: Patient's elevated A1c likely due to a combination of poor diet and self-decrease of Metformin to 1000 mg/day. Patient has increased Metformin back to 1500 mg/day and notes diarrhea has resolved. Will plan to continue current regimen today and re-assess at next visit if needed based on BG.      Medication Changes:      CHANGE:  Metformin 1000 mg to 1 tablet QAM and 0.5 tablet QPM    CONTINUE:  Glimepiride 4 mg daily  Jardiance 25 mg daily  Rybelsus 14 mg daily     Future Considerations:  Increase Metformin back up to 2000 mg/day based on tolerability  Kerendia     Monitoring and Education:  Encouraged patient to test blood glucose regularly and will discuss at follow-up  Counseled patient on MOA, expectations, side effects, duration of therapy, contraindications, administration, and monitoring parameters  Answered all patient questions and concerns; provided [facility], [facility] within [facility] phone number if issues/questions arise

## 2025-06-17 NOTE — PROGRESS NOTES
"  Clinical Pharmacy Appointment    Patient ID: Melchor Bergeron is a 65 y.o. male who presents for Diabetes.    Pt is here for Follow Up appointment.      Referring Provider: Alex Britt DO   Last visit with PCP: 25  Next visit with PCP: 12/15/25    Subjective     Interval History  Saw PCP  A1c up to 8.5%  Attributes to poor diet over the past few months due to holiday  Was only taking Metformin once daily due to diarrhea  Advised by PCP to try 1000 mg in the morning and 500 mg at night for 3 months, then increase back up to 2000 mg/day  Trying to watch diet/walking more  Was going to pool since last visit for about 4 months  Walking in park 3x/week for about an hour  Switched from Steglatro to Jardiance, reports no issues     Lifestyle Changes:  Current diet: in general, a \"healthy\" diet  , low fat/ cholesterol  Current exercise: swimming in pool and walking 3x/week for 1 hour     Glucose Monitoring Regimen:  Patient is using glucometer; testing infrequently d/t finger fatigue     Any episodes of hypoglycemia? no     Adverse Effects:   Patient denies any GI adverse effects (Upset stomach/diarrhea/constipation/nausea/vomiting)  Patient denies any urinary side effects (frequent urination/yeast infection/UTI)     Adherence: Good, patient has some confusion regarding current therapy however expected due to recent changes  Affordability/Accessibility  No issues with cost of medications at this time.      Diabetes Pharmacotherapy:    Metformin 1000 m tablet QAM and 0.5 tablet QPM  Glimepiride 4 mg daily  Jardiance 25 mg daily  Rybelsus 14 mg daily     Historical Diabetes Pharmacotherapy:  Steglatro     Secondary Prevention  Statin? Yes (Rosuvastatin 40 mg)  ACE-I/ARB? Yes (Lisinopril 10 mg)  Aspirin? Yes     Pertinent PMH Review  PMH of Pancreatitis: No  PMH of Retinopathy: No  PMH of Recurrent Urinary Tract Infections: No  PMH of Medullary Thyroid Cancer (MTC): No  PMH of Multiple Endocrine Neoplasia (MEN) " Type II: No     Health Maintenance:   Foot Exam: unknown  Eye Exam: unknown  Lipid Panel: LDL 46 mg/dL   UACR: 91.2 (11/2024)  Influenza Vaccine: 9/30/24  Pneumonia Vaccine: PCV20 3/23/24, PPSV23 8/23/16     Drug Interactions: No significant drug-drug interactions exist that require change in therapy.     Medication System Management  Patient's preferred pharmacy: [facility] @ [facility]    Objective     There were no vitals taken for this visit.     Labs  Lab Results   Component Value Date    BILITOT 0.6 06/05/2025    CALCIUM 9.6 06/05/2025    CO2 27 06/05/2025     06/05/2025    CREATININE 1.26 06/05/2025    GLUCOSE 89 06/05/2025    ALKPHOS 82 06/05/2025    K 5.0 06/05/2025    PROT 6.7 06/05/2025     06/05/2025    AST 19 06/05/2025    ALT 19 06/05/2025    BUN 27 (H) 06/05/2025    ANIONGAP 8 06/05/2025    MG 2.0 06/05/2025    ALBUMIN 4.4 06/05/2025     Lab Results   Component Value Date    TRIG 82 06/05/2025    CHOL 134 06/05/2025    LDLCALC 60 06/05/2025    HDL 58 06/05/2025     Lab Results   Component Value Date    HGBA1C 8.5 (H) 06/05/2025       Medications Ordered Prior to Encounter[1]     Assessment/Plan   Problem List Items Addressed This Visit           ICD-10-CM    Type 2 diabetes mellitus with microalbuminuria, without long-term current use of insulin (Multi) E11.29, R80.9    Patient's goal A1c is < 7%.  Is pt at goal? No, 8.5% on 6/11/25.  Patient's SMBGs are unavailable from today's visit, however elevated likely based on A1c. Patient has been trying to endorse in healthier food options, limit carb intake and increase physical activity.      Rationale for plan: Patient's elevated A1c likely due to a combination of poor diet and self-decrease of Metformin to 1000 mg/day. Patient has increased Metformin back to 1500 mg/day and notes diarrhea has resolved. Will plan to continue current regimen today and re-assess at next visit if needed based on BG.      Medication Changes:       CHANGE:  Metformin 1000 mg to 1 tablet QAM and 0.5 tablet QPM    CONTINUE:  Glimepiride 4 mg daily  Jardiance 25 mg daily  Rybelsus 14 mg daily     Future Considerations:  Increase Metformin back up to 2000 mg/day based on tolerability  Kerendia     Monitoring and Education:  Encouraged patient to test blood glucose regularly and will discuss at follow-up  Counseled patient on MOA, expectations, side effects, duration of therapy, contraindications, administration, and monitoring parameters  Answered all patient questions and concerns; provided [facility] phone number if issues/questions arise          Renee Ash PharmD  Clinical Ambulatory Care Pharmacist  Ph: [phone]    Continue all meds under the continuation of care with the referring provider and clinical pharmacy team.    Clinical Pharmacist follow up: 8/12/25 or sooner as needed based on clinical intervention  Type of Encounter:  Telephone    Verbal consent to manage patient's drug therapy was obtained from the patient. They were informed they may decline to participate or withdraw from participation in pharmacy services at any time.       [1]   Current Outpatient Medications on File Prior to Visit   Medication Sig Dispense Refill    amoxicillin (Amoxil) 875 mg tablet Take 1 tablet (875 mg) by mouth once daily. 7 tablet 0    apixaban (Eliquis) 5 mg tablet Take 1 tablet (5 mg) by mouth 2 times a day. 180 tablet 3    aspirin 81 mg EC tablet Take 1 tablet (81 mg) by mouth once daily. 90 tablet 3    betamethasone dipropionate 0.05 % cream Apply topically 2 times a day as needed for irritation or rash. 45 g 11    cholecalciferol (Vitamin D3) 25 mcg (1,000 units) tablet Take 1 tablet (25 mcg) by mouth once daily.      cyclobenzaprine (Flexeril) 10 mg tablet Take 1 tablet (10 mg) by mouth 2 times a day as needed for muscle spasms. 180 tablet 3    DULoxetine (Cymbalta) 30 mg DR capsule Take 1 capsule (30 mg) by mouth once daily. Do not crush or chew 90 capsule 3     empagliflozin (Jardiance) 25 mg tablet Take 1 tablet (25 mg) by mouth once daily in the morning. 90 tablet 3    ezetimibe (Zetia) 10 mg tablet Take 1 tablet (10 mg) by mouth once daily. 90 tablet 3    famotidine (Pepcid) 20 mg tablet Take 1 tablet (20 mg) by mouth 2 times a day. 180 tablet 3    glimepiride (Amaryl) 4 mg tablet Take 1 tablet (4 mg) by mouth once daily in the morning. Take before meals. 90 tablet 3    lisinopril 10 mg tablet Take 1 tablet (10 mg) by mouth once daily. 90 tablet 3    magnesium 250 mg tablet Take 1 tablet (250 mg) by mouth once daily.      metFORMIN (Glucophage) 1,000 mg tablet Take 1 tablet (1,000 mg) by mouth 2 times a day. With meals 180 tablet 3    metoprolol succinate XL (Toprol-XL) 50 mg 24 hr tablet Take 1 tablet (50 mg) by mouth once daily. 90 tablet 3    niacin 50 mg tablet Take 1 tablet (50 mg) by mouth once daily with breakfast.      rosuvastatin (Crestor) 40 mg tablet Take 1 tablet (40 mg) by mouth once daily. 90 tablet 3    semaglutide (Rybelsus) 14 mg tablet tablet Take 1 tablet (14 mg) by mouth once daily. 90 tablet 3    [DISCONTINUED] apixaban (Eliquis) 5 mg tablet Take 1 tablet (5 mg) by mouth twice a day.      [DISCONTINUED] aspirin 81 mg EC tablet Take 1 tablet (81 mg) by mouth once daily.      [DISCONTINUED] cyclobenzaprine (Flexeril) 10 mg tablet Take 1 tablet (10 mg) by mouth 2 times a day as needed for muscle spasms. 180 tablet 3    [DISCONTINUED] DULoxetine (Cymbalta) 30 mg DR capsule TAKE 1 CAPSULE DAILY (DO NOT CRUSH OR CHEW) 90 capsule 3    [DISCONTINUED] empagliflozin (Jardiance) 25 mg Take 1 tablet (25 mg) by mouth once daily in the morning. 90 tablet 1    [DISCONTINUED] ezetimibe (Zetia) 10 mg tablet Take 1 tablet (10 mg) by mouth once daily.      [DISCONTINUED] famotidine (Pepcid) 20 mg tablet TAKE 1 TABLET TWICE A  tablet 3    [DISCONTINUED] glimepiride (Amaryl) 4 mg tablet TAKE 1 TABLET DAILY IN THE MORNING BEFORE A MEAL 90 tablet 3     [DISCONTINUED] hydrocortisone (Anusol-HC) 2.5 % rectal cream Insert into the rectum 4 times a day as needed for hemorrhoids (rectal discomfort). 30 g 11    [DISCONTINUED] lisinopril 10 mg tablet Take 1 tablet (10 mg) by mouth once daily. 90 tablet 3    [DISCONTINUED] metFORMIN (Glucophage) 1,000 mg tablet Take 1 tablet (1,000 mg) by mouth 2 times a day. With meals 180 tablet 3    [DISCONTINUED] metoprolol succinate XL (Toprol-XL) 50 mg 24 hr tablet Take 1 tablet (50 mg) by mouth once daily.      [DISCONTINUED] rosuvastatin (Crestor) 40 mg tablet Take 1 tablet (40 mg) by mouth once daily. 90 tablet 3    [DISCONTINUED] semaglutide (Rybelsus) 14 mg tablet tablet Take 1 tablet (14 mg) by mouth once daily. 90 tablet 3     No current facility-administered medications on file prior to visit.

## 2025-08-11 ENCOUNTER — TELEPHONE (OUTPATIENT)
Dept: PHARMACY | Facility: HOSPITAL | Age: 65
End: 2025-08-11
Payer: COMMERCIAL

## 2025-08-11 DIAGNOSIS — Z87.39 HISTORY OF OSTEOMYELITIS: ICD-10-CM

## 2025-08-11 RX ORDER — AMOXICILLIN 875 MG/1
875 TABLET, COATED ORAL DAILY
Qty: 30 TABLET | Refills: 0 | Status: SHIPPED | OUTPATIENT
Start: 2025-08-11 | End: 2025-09-10

## 2025-08-19 ENCOUNTER — APPOINTMENT (OUTPATIENT)
Dept: PHARMACY | Facility: HOSPITAL | Age: 65
End: 2025-08-19
Payer: COMMERCIAL

## 2025-08-19 DIAGNOSIS — R80.9 TYPE 2 DIABETES MELLITUS WITH MICROALBUMINURIA, WITHOUT LONG-TERM CURRENT USE OF INSULIN (MULTI): ICD-10-CM

## 2025-08-19 DIAGNOSIS — E11.29 TYPE 2 DIABETES MELLITUS WITH MICROALBUMINURIA, WITHOUT LONG-TERM CURRENT USE OF INSULIN (MULTI): ICD-10-CM

## 2025-08-19 DIAGNOSIS — Z87.39 HISTORY OF OSTEOMYELITIS: ICD-10-CM

## 2025-08-19 RX ORDER — AMOXICILLIN 875 MG/1
875 TABLET, COATED ORAL DAILY
Qty: 90 TABLET | Refills: 1 | Status: SHIPPED | OUTPATIENT
Start: 2025-08-19 | End: 2026-02-15

## 2025-09-16 ENCOUNTER — APPOINTMENT (OUTPATIENT)
Dept: GASTROENTEROLOGY | Facility: EXTERNAL LOCATION | Age: 65
End: 2025-09-16
Payer: COMMERCIAL

## 2025-09-30 ENCOUNTER — APPOINTMENT (OUTPATIENT)
Dept: GASTROENTEROLOGY | Facility: EXTERNAL LOCATION | Age: 65
End: 2025-09-30
Payer: COMMERCIAL

## 2025-12-15 ENCOUNTER — APPOINTMENT (OUTPATIENT)
Dept: PRIMARY CARE | Facility: CLINIC | Age: 65
End: 2025-12-15
Payer: COMMERCIAL

## 2025-12-16 ENCOUNTER — APPOINTMENT (OUTPATIENT)
Dept: PHARMACY | Facility: HOSPITAL | Age: 65
End: 2025-12-16
Payer: COMMERCIAL

## (undated) DEVICE — SUTURE, VICRYL, 2-0, 27 IN, X-1, UNDYED

## (undated) DEVICE — APPLICATOR, CHLORAPREP, W/ORANGE TINT, 26ML

## (undated) DEVICE — DRESSING, GAUZE, SPONGE, 12 PLY, 4 X 4 IN, PLASTIC POUCH, STRL 10PK

## (undated) DEVICE — TUBING, SUCTION, 6MM X 10, CLEAN N-COND

## (undated) DEVICE — GLOVE, SURGICAL, PROTEXIS PI , 7.0, PF, LF

## (undated) DEVICE — GLOVE, SURGICAL, PROTEXIS PI , 7.5, PF, LF

## (undated) DEVICE — CUFF, TOURNIQUET, DUAL PORT, SNG BLADDER, 30 IN, PLC

## (undated) DEVICE — ENDO CARRY-ON PROCEDURE KIT: Brand: ENDO CARRY-ON PROCEDURE KIT

## (undated) DEVICE — DRAPE, SHEET, XL

## (undated) DEVICE — GOWN, SURGICAL, ROYAL SILK, XL, STERILE

## (undated) DEVICE — BATH BLANKET STERILE

## (undated) DEVICE — CANNULA CAPNOGRAPHY AD O2 TBNG L7FT FEM LUER STYL 4707F7725] SALTER LABS INC]

## (undated) DEVICE — FORCEPS BX L240CM JAW DIA2.4MM ORNG L CAP W/ NDL DISP RAD

## (undated) DEVICE — IMMOBILIZER, KNEE, 19IN, ADJUSTABLE FOAM, W/ ELASTIC STRAPS

## (undated) DEVICE — SOLUTION, TOPICAL, ALCOHOL, ISOPROPYL 70%, 16 OZ

## (undated) DEVICE — Device

## (undated) DEVICE — LUBRICANT SURG JELLY ST BACTER TUBE 4.25OZ

## (undated) DEVICE — DRAPE, SHEET, EXTREMITY, W/ARM BOARD COVERS, 87 X 106 X 128 IN, DISPOSABLE, LF, STERILE

## (undated) DEVICE — STRIP, SKIN CLOSURE, STERI STRIP, REINFORCED, 0.5 X 4 IN

## (undated) DEVICE — DRESSING, GAUZE, SPONGE, 8 PLY, CURITY, 4 X 4, LF

## (undated) DEVICE — HANDLE, LITE EZ, PLASTIC, DISP, LF

## (undated) DEVICE — TUBING, SUCTION, 1/4" X 10', STRAIGHT: Brand: MEDLINE

## (undated) DEVICE — SUTURE, VICRYL, 1, 27 IN, CP, VIOLET

## (undated) DEVICE — STRAP, ARM BOARD, 32 X 1.5

## (undated) DEVICE — CONMED CHANNEL MASTER COMBINATION CLEANING BRUSH, 230 CM X 2.0 MM: Brand: CONMED

## (undated) DEVICE — MAT, FLOOR, STANDARD FLUID BARRIER, 32X44, GREEN

## (undated) DEVICE — GOWN, SURGICAL, IMPLT, BACK, XLARGE, XLONG, STERILE

## (undated) DEVICE — RESERVOIR, DRAINAGE, WOUND, JACKSON-PRATT, 100 CC, SILICONE

## (undated) DEVICE — GLOVE SURG SZ 75 STD WHT LTX SYN POLYMER BEAD REINF ANTI RL

## (undated) DEVICE — PADDING, CAST, SPECIALIST, 6 IN X 4 YD, STERILE

## (undated) DEVICE — GLOVE, SURGICAL, PROTEXIS PI BLUE W/NEUTHERA, 8.0, PF, LF

## (undated) DEVICE — BANDAGE, COFLEX, 4 X 5 YDS, FOAM TAN, STERILE, LF

## (undated) DEVICE — GLOVE, SURGICAL, PROTEXIS PI BLUE W/NEUTHERA, 7.0, PF, LF

## (undated) DEVICE — PROBE, SERFAS, 3.5MM, 50 S, ENERGY SUCTION SYSTEM

## (undated) DEVICE — PREP, IODOPHOR, W/ALCOHOL, DURAPREP, W/APPLICATOR, 26 CC

## (undated) DEVICE — BANDAGE, ESMARK, 6 IN X 9 FT, STERILE

## (undated) DEVICE — PADDING, CAST, SPECIALIST, 6 IN X 4 YD

## (undated) DEVICE — GOWN, SURGICAL, ROYAL SILK, LG, STERILE

## (undated) DEVICE — MANIFOLD, 4 PORT NEPTUNE STANDARD

## (undated) DEVICE — COVER, MAYO STAND, W/PAD, 23 IN, DISPOSABLE, PLASTIC, LF, STERILE

## (undated) DEVICE — STRAP, VELCRO, BODY, 4 X 60IN, NS

## (undated) DEVICE — TUBING, PATIENT 8FT STERILE

## (undated) DEVICE — PATIENT RETURN ELECTRODE, SINGLE-USE, CONTACT QUALITY MONITORING, ADULT, WITH 9FT CORD, FOR PATIENTS WEIGING OVER 33LBS. (15KG): Brand: MEGADYNE

## (undated) DEVICE — TUBING, PUMP REDEUCE 8FT STERILE

## (undated) DEVICE — TOWELS 4-PK

## (undated) DEVICE — STOCKINETTE, IMPERVIOUS, LARGE, 9IN X 48IN

## (undated) DEVICE — DRAIN, JACKSON-PRATT, ROUND/W TROCAR, SILICONE, 10FR

## (undated) DEVICE — GLOVE, SURGICAL, ORTHO, PROTEXIS, HYDROGEL, 8.0, PF, LATEX

## (undated) DEVICE — DRESSING, ABDOMINAL PAD, CURITY, 7.5 X 8 IN

## (undated) DEVICE — PACKING, IODOFORM, CURITY, 0.25 IN X 5 YD, STERILE

## (undated) DEVICE — CONMED SINGULAR POLYPECTOMY SNARE; MEDIUM HEXAGONAL LOOP, FIRM WIRE, OLYMPUS CONNECTOR, 230 CM: Brand: SINGULAR